# Patient Record
Sex: FEMALE | Race: BLACK OR AFRICAN AMERICAN | Employment: OTHER | URBAN - METROPOLITAN AREA
[De-identification: names, ages, dates, MRNs, and addresses within clinical notes are randomized per-mention and may not be internally consistent; named-entity substitution may affect disease eponyms.]

---

## 2017-04-05 ENCOUNTER — HOSPITAL ENCOUNTER (OUTPATIENT)
Dept: OTHER | Age: 82
Discharge: HOME | End: 2017-04-05
Attending: INTERNAL MEDICINE | Admitting: INTERNAL MEDICINE

## 2017-04-05 DIAGNOSIS — E11.9 CONTROLLED TYPE 2 DIABETES MELLITUS WITHOUT COMPLICATION, WITHOUT LONG-TERM CURRENT USE OF INSULIN (HCC): ICD-10-CM

## 2017-04-05 LAB
A/G RATIO: 1.2 (ref 1.1–2.2)
ALBUMIN SERPL-MCNC: 4.4 G/DL (ref 3.4–5)
ALP BLD-CCNC: 69 U/L (ref 40–129)
ALT SERPL-CCNC: 17 U/L (ref 10–40)
ANION GAP SERPL CALCULATED.3IONS-SCNC: 12 MMOL/L (ref 3–16)
AST SERPL-CCNC: 26 U/L (ref 15–37)
BILIRUB SERPL-MCNC: 0.6 MG/DL (ref 0–1)
BUN BLDV-MCNC: 12 MG/DL (ref 7–20)
CALCIUM SERPL-MCNC: 9.9 MG/DL (ref 8.3–10.6)
CHLORIDE BLD-SCNC: 103 MMOL/L (ref 99–110)
CHOLESTEROL, TOTAL: 167 MG/DL (ref 0–199)
CO2: 28 MMOL/L (ref 21–32)
CREAT SERPL-MCNC: 0.7 MG/DL (ref 0.6–1.2)
ESTIMATED AVERAGE GLUCOSE: 139.9 MG/DL
FOLATE: >20 NG/ML (ref 4.78–24.2)
GFR AFRICAN AMERICAN: >60
GFR NON-AFRICAN AMERICAN: >60
GLOBULIN: 3.6 G/DL
GLUCOSE BLD-MCNC: 78 MG/DL (ref 70–99)
HBA1C MFR BLD: 6.5 %
HCT VFR BLD CALC: 41.5 % (ref 36–48)
HDLC SERPL-MCNC: 69 MG/DL (ref 40–60)
HEMOGLOBIN: 13.1 G/DL (ref 12–16)
LDL CHOLESTEROL CALCULATED: 80 MG/DL
MCH RBC QN AUTO: 30.7 PG (ref 26–34)
MCHC RBC AUTO-ENTMCNC: 31.7 G/DL (ref 31–36)
MCV RBC AUTO: 96.9 FL (ref 80–100)
PDW BLD-RTO: 15 % (ref 12.4–15.4)
PLATELET # BLD: 191 K/UL (ref 135–450)
PMV BLD AUTO: 9.1 FL (ref 5–10.5)
POTASSIUM SERPL-SCNC: 5.1 MMOL/L (ref 3.5–5.1)
RBC # BLD: 4.28 M/UL (ref 4–5.2)
SODIUM BLD-SCNC: 143 MMOL/L (ref 136–145)
TOTAL PROTEIN: 8 G/DL (ref 6.4–8.2)
TRIGL SERPL-MCNC: 92 MG/DL (ref 0–150)
TSH SERPL DL<=0.05 MIU/L-ACNC: 0.6 UIU/ML (ref 0.27–4.2)
VITAMIN B-12: 597 PG/ML (ref 211–911)
VLDLC SERPL CALC-MCNC: 18 MG/DL
WBC # BLD: 4.7 K/UL (ref 4–11)

## 2017-04-10 ENCOUNTER — OFFICE VISIT (OUTPATIENT)
Dept: INTERNAL MEDICINE CLINIC | Age: 82
End: 2017-04-10

## 2017-04-10 VITALS
HEART RATE: 82 BPM | WEIGHT: 161 LBS | SYSTOLIC BLOOD PRESSURE: 130 MMHG | DIASTOLIC BLOOD PRESSURE: 72 MMHG | BODY MASS INDEX: 25.99 KG/M2 | OXYGEN SATURATION: 98 %

## 2017-04-10 DIAGNOSIS — E11.21 CONTROLLED TYPE 2 DIABETES MELLITUS WITH DIABETIC NEPHROPATHY, WITHOUT LONG-TERM CURRENT USE OF INSULIN (HCC): Primary | ICD-10-CM

## 2017-04-10 PROCEDURE — 99214 OFFICE O/P EST MOD 30 MIN: CPT | Performed by: INTERNAL MEDICINE

## 2017-08-15 ENCOUNTER — OFFICE VISIT (OUTPATIENT)
Dept: ENT CLINIC | Age: 82
End: 2017-08-15

## 2017-08-15 VITALS — DIASTOLIC BLOOD PRESSURE: 77 MMHG | SYSTOLIC BLOOD PRESSURE: 157 MMHG | HEART RATE: 63 BPM | HEIGHT: 66 IN

## 2017-08-15 DIAGNOSIS — J30.9 ALLERGIC SINUSITIS: Primary | ICD-10-CM

## 2017-08-15 DIAGNOSIS — H60.63 CHRONIC EXTERNAL EAR INFECTION, BILATERAL: ICD-10-CM

## 2017-08-15 DIAGNOSIS — K21.9 LARYNGOPHARYNGEAL REFLUX DISEASE: ICD-10-CM

## 2017-08-15 DIAGNOSIS — H61.23 BILATERAL IMPACTED CERUMEN: ICD-10-CM

## 2017-08-15 PROCEDURE — 69210 REMOVE IMPACTED EAR WAX UNI: CPT | Performed by: OTOLARYNGOLOGY

## 2017-10-07 ENCOUNTER — HOSPITAL ENCOUNTER (OUTPATIENT)
Dept: OTHER | Age: 82
Discharge: HOME | End: 2017-10-07
Attending: INTERNAL MEDICINE | Admitting: INTERNAL MEDICINE

## 2017-10-07 DIAGNOSIS — E11.21 CONTROLLED TYPE 2 DIABETES MELLITUS WITH DIABETIC NEPHROPATHY, WITHOUT LONG-TERM CURRENT USE OF INSULIN (HCC): ICD-10-CM

## 2017-10-07 LAB
A/G RATIO: 1 (ref 1.1–2.2)
ALBUMIN SERPL-MCNC: 4.1 G/DL (ref 3.4–5)
ALP BLD-CCNC: 64 U/L (ref 40–129)
ALT SERPL-CCNC: 12 U/L (ref 10–40)
ANION GAP SERPL CALCULATED.3IONS-SCNC: 18 MMOL/L (ref 3–16)
AST SERPL-CCNC: 28 U/L (ref 15–37)
BILIRUB SERPL-MCNC: 0.5 MG/DL (ref 0–1)
BUN BLDV-MCNC: 11 MG/DL (ref 7–20)
CALCIUM SERPL-MCNC: 10.2 MG/DL (ref 8.3–10.6)
CHLORIDE BLD-SCNC: 102 MMOL/L (ref 99–110)
CHOLESTEROL, TOTAL: 160 MG/DL (ref 0–199)
CO2: 24 MMOL/L (ref 21–32)
CREAT SERPL-MCNC: 0.8 MG/DL (ref 0.6–1.2)
GFR AFRICAN AMERICAN: >60
GFR NON-AFRICAN AMERICAN: >60
GLOBULIN: 4.3 G/DL
GLUCOSE BLD-MCNC: 76 MG/DL (ref 70–99)
HCT VFR BLD CALC: 42.7 % (ref 36–48)
HDLC SERPL-MCNC: 58 MG/DL (ref 40–60)
HEMOGLOBIN: 13.7 G/DL (ref 12–16)
LDL CHOLESTEROL CALCULATED: 83 MG/DL
MCH RBC QN AUTO: 31.6 PG (ref 26–34)
MCHC RBC AUTO-ENTMCNC: 32 G/DL (ref 31–36)
MCV RBC AUTO: 98.7 FL (ref 80–100)
PDW BLD-RTO: 15 % (ref 12.4–15.4)
PLATELET # BLD: 177 K/UL (ref 135–450)
PMV BLD AUTO: 9.5 FL (ref 5–10.5)
POTASSIUM SERPL-SCNC: 4.2 MMOL/L (ref 3.5–5.1)
RBC # BLD: 4.33 M/UL (ref 4–5.2)
SODIUM BLD-SCNC: 144 MMOL/L (ref 136–145)
TOTAL PROTEIN: 8.4 G/DL (ref 6.4–8.2)
TRIGL SERPL-MCNC: 94 MG/DL (ref 0–150)
VLDLC SERPL CALC-MCNC: 19 MG/DL
WBC # BLD: 4.2 K/UL (ref 4–11)

## 2017-10-08 LAB
ESTIMATED AVERAGE GLUCOSE: 148.5 MG/DL
HBA1C MFR BLD: 6.8 %

## 2017-10-09 ENCOUNTER — OFFICE VISIT (OUTPATIENT)
Dept: INTERNAL MEDICINE CLINIC | Age: 82
End: 2017-10-09

## 2017-10-09 VITALS
WEIGHT: 171 LBS | DIASTOLIC BLOOD PRESSURE: 72 MMHG | HEIGHT: 66 IN | HEART RATE: 64 BPM | BODY MASS INDEX: 27.48 KG/M2 | SYSTOLIC BLOOD PRESSURE: 158 MMHG

## 2017-10-09 DIAGNOSIS — E11.9 CONTROLLED TYPE 2 DIABETES MELLITUS WITHOUT COMPLICATION, WITHOUT LONG-TERM CURRENT USE OF INSULIN (HCC): Primary | ICD-10-CM

## 2017-10-09 DIAGNOSIS — I10 ESSENTIAL HYPERTENSION: ICD-10-CM

## 2017-10-09 PROCEDURE — 93000 ELECTROCARDIOGRAM COMPLETE: CPT | Performed by: INTERNAL MEDICINE

## 2017-10-09 PROCEDURE — 99214 OFFICE O/P EST MOD 30 MIN: CPT | Performed by: INTERNAL MEDICINE

## 2017-10-09 RX ORDER — LISINOPRIL 10 MG/1
10 TABLET ORAL 2 TIMES DAILY
Qty: 180 TABLET | Refills: 3 | Status: SHIPPED | OUTPATIENT
Start: 2017-10-09 | End: 2018-09-05 | Stop reason: SDUPTHER

## 2017-10-09 RX ORDER — HYDROCHLOROTHIAZIDE 12.5 MG/1
12.5 CAPSULE, GELATIN COATED ORAL EVERY MORNING
Qty: 30 CAPSULE | Refills: 3 | Status: SHIPPED | OUTPATIENT
Start: 2017-10-09 | End: 2018-02-04 | Stop reason: SDUPTHER

## 2017-10-09 NOTE — PROGRESS NOTES
Subjective:      Patient ID: Riya Dong is a 80 y.o. female. HPI-no complaints, BS well controlled at home    Review of Systems-saw Ophth June 2017    Objective:   Physical Exam   Constitutional: She is oriented to person, place, and time. She appears well-developed and well-nourished. No distress. Neck: No JVD present. Cardiovascular: Normal rate and normal heart sounds. No murmur heard. Pulmonary/Chest: Effort normal and breath sounds normal. No respiratory distress. Abdominal: Soft. Bowel sounds are normal. She exhibits no distension. Genitourinary:   Genitourinary Comments: deferred   Musculoskeletal: She exhibits no edema. Neurological: She is alert and oriented to person, place, and time. Psychiatric: She has a normal mood and affect. Her behavior is normal. Judgment and thought content normal.   Nursing note and vitals reviewed.     EKG-normal, kvng klein    Lab Results   Component Value Date    LABA1C 6.8 10/07/2017     Lab Results   Component Value Date    .5 10/07/2017     Lab Results   Component Value Date    CHOL 160 10/07/2017    CHOL 167 04/05/2017    CHOL 147 10/10/2016     Lab Results   Component Value Date    TRIG 94 10/07/2017    TRIG 92 04/05/2017    TRIG 76 10/10/2016     Lab Results   Component Value Date    HDL 58 10/07/2017    HDL 69 (H) 04/05/2017    HDL 62 (H) 10/10/2016     Lab Results   Component Value Date    LDLCALC 83 10/07/2017    LDLCALC 80 04/05/2017    LDLCALC 70 10/10/2016     Lab Results   Component Value Date    LABVLDL 19 10/07/2017    LABVLDL 18 04/05/2017    LABVLDL 15 10/10/2016     No results found for: CHOLHDLRATIO  Lab Results   Component Value Date    WBC 4.2 10/07/2017    HGB 13.7 10/07/2017    HCT 42.7 10/07/2017    MCV 98.7 10/07/2017     10/07/2017     Lab Results   Component Value Date     10/07/2017    K 4.2 10/07/2017     10/07/2017    CO2 24 10/07/2017    BUN 11 10/07/2017    CREATININE 0.8 10/07/2017    GLUCOSE

## 2017-11-03 ENCOUNTER — TELEPHONE (OUTPATIENT)
Dept: INTERNAL MEDICINE CLINIC | Age: 82
End: 2017-11-03

## 2017-11-03 NOTE — TELEPHONE ENCOUNTER
----- Message from Yevgeniy Martinez MD sent at 11/2/2017  1:13 PM EDT -----  OK to f/u in 1 year for next mammogram    Yevgeniy Martinez

## 2017-11-21 ENCOUNTER — NURSE ONLY (OUTPATIENT)
Dept: INTERNAL MEDICINE CLINIC | Age: 82
End: 2017-11-21

## 2017-11-21 VITALS — DIASTOLIC BLOOD PRESSURE: 68 MMHG | SYSTOLIC BLOOD PRESSURE: 136 MMHG

## 2018-01-24 ENCOUNTER — OFFICE VISIT (OUTPATIENT)
Dept: ENT CLINIC | Age: 83
End: 2018-01-24

## 2018-01-24 VITALS — SYSTOLIC BLOOD PRESSURE: 120 MMHG | DIASTOLIC BLOOD PRESSURE: 80 MMHG

## 2018-01-24 DIAGNOSIS — J30.9 ALLERGIC SINUSITIS: Primary | ICD-10-CM

## 2018-01-24 DIAGNOSIS — J32.9 RECURRENT SINUSITIS: ICD-10-CM

## 2018-01-24 PROCEDURE — 96372 THER/PROPH/DIAG INJ SC/IM: CPT | Performed by: OTOLARYNGOLOGY

## 2018-01-24 PROCEDURE — 99213 OFFICE O/P EST LOW 20 MIN: CPT | Performed by: OTOLARYNGOLOGY

## 2018-01-24 RX ORDER — TRIMETHOPRIM 100 MG/1
100 TABLET ORAL 2 TIMES DAILY
COMMUNITY
End: 2018-08-01

## 2018-01-24 RX ORDER — MONTELUKAST SODIUM 10 MG/1
10 TABLET ORAL NIGHTLY
COMMUNITY
End: 2021-04-13 | Stop reason: ALTCHOICE

## 2018-01-24 RX ORDER — METHYLPREDNISOLONE ACETATE 40 MG/ML
40 INJECTION, SUSPENSION INTRA-ARTICULAR; INTRALESIONAL; INTRAMUSCULAR; SOFT TISSUE ONCE
Status: COMPLETED | OUTPATIENT
Start: 2018-01-24 | End: 2018-01-24

## 2018-01-24 RX ORDER — AZITHROMYCIN 250 MG/1
TABLET, FILM COATED ORAL
Qty: 1 PACKET | Refills: 0 | Status: SHIPPED | OUTPATIENT
Start: 2018-01-24 | End: 2018-04-11

## 2018-01-24 RX ADMIN — METHYLPREDNISOLONE ACETATE 40 MG: 40 INJECTION, SUSPENSION INTRA-ARTICULAR; INTRALESIONAL; INTRAMUSCULAR; SOFT TISSUE at 10:32

## 2018-04-11 ENCOUNTER — OFFICE VISIT (OUTPATIENT)
Dept: INTERNAL MEDICINE CLINIC | Age: 83
End: 2018-04-11

## 2018-04-11 ENCOUNTER — HOSPITAL ENCOUNTER (OUTPATIENT)
Dept: OTHER | Age: 83
Discharge: HOME | End: 2018-04-11
Attending: INTERNAL MEDICINE | Admitting: INTERNAL MEDICINE

## 2018-04-11 VITALS
BODY MASS INDEX: 27.97 KG/M2 | SYSTOLIC BLOOD PRESSURE: 134 MMHG | HEIGHT: 66 IN | HEART RATE: 68 BPM | DIASTOLIC BLOOD PRESSURE: 68 MMHG | WEIGHT: 174 LBS

## 2018-04-11 DIAGNOSIS — Z91.81 AT MODERATE RISK FOR FALL: ICD-10-CM

## 2018-04-11 DIAGNOSIS — R00.2 HEART PALPITATIONS: ICD-10-CM

## 2018-04-11 DIAGNOSIS — E11.21 CONTROLLED TYPE 2 DIABETES MELLITUS WITH DIABETIC NEPHROPATHY, WITHOUT LONG-TERM CURRENT USE OF INSULIN (HCC): Primary | ICD-10-CM

## 2018-04-11 LAB
A/G RATIO: 1.2 (ref 1.1–2.2)
ALBUMIN SERPL-MCNC: 4.2 G/DL (ref 3.4–5)
ALP BLD-CCNC: 58 U/L (ref 40–129)
ALT SERPL-CCNC: 15 U/L (ref 10–40)
ANION GAP SERPL CALCULATED.3IONS-SCNC: 13 MMOL/L (ref 3–16)
AST SERPL-CCNC: 25 U/L (ref 15–37)
BILIRUB SERPL-MCNC: 0.4 MG/DL (ref 0–1)
BUN BLDV-MCNC: 15 MG/DL (ref 7–20)
CALCIUM SERPL-MCNC: 10 MG/DL (ref 8.3–10.6)
CHLORIDE BLD-SCNC: 102 MMOL/L (ref 99–110)
CHOLESTEROL, TOTAL: 175 MG/DL (ref 0–199)
CO2: 28 MMOL/L (ref 21–32)
CREAT SERPL-MCNC: 0.8 MG/DL (ref 0.6–1.2)
GFR AFRICAN AMERICAN: >60
GFR NON-AFRICAN AMERICAN: >60
GLOBULIN: 3.6 G/DL
GLUCOSE BLD-MCNC: 74 MG/DL (ref 70–99)
HCT VFR BLD CALC: 38.5 % (ref 36–48)
HDLC SERPL-MCNC: 71 MG/DL (ref 40–60)
HEMOGLOBIN: 12.7 G/DL (ref 12–16)
LDL CHOLESTEROL CALCULATED: 85 MG/DL
MCH RBC QN AUTO: 31.4 PG (ref 26–34)
MCHC RBC AUTO-ENTMCNC: 32.8 G/DL (ref 31–36)
MCV RBC AUTO: 95.7 FL (ref 80–100)
PDW BLD-RTO: 14.7 % (ref 12.4–15.4)
PLATELET # BLD: 221 K/UL (ref 135–450)
PMV BLD AUTO: 8.4 FL (ref 5–10.5)
POTASSIUM SERPL-SCNC: 4.4 MMOL/L (ref 3.5–5.1)
RBC # BLD: 4.03 M/UL (ref 4–5.2)
SODIUM BLD-SCNC: 143 MMOL/L (ref 136–145)
TOTAL PROTEIN: 7.8 G/DL (ref 6.4–8.2)
TRIGL SERPL-MCNC: 97 MG/DL (ref 0–150)
VLDLC SERPL CALC-MCNC: 19 MG/DL
WBC # BLD: 6.3 K/UL (ref 4–11)

## 2018-04-11 PROCEDURE — 99213 OFFICE O/P EST LOW 20 MIN: CPT | Performed by: INTERNAL MEDICINE

## 2018-04-11 RX ORDER — METOPROLOL SUCCINATE 25 MG/1
TABLET, EXTENDED RELEASE ORAL
Qty: 90 TABLET | Refills: 3 | Status: SHIPPED | OUTPATIENT
Start: 2018-04-11 | End: 2019-10-14 | Stop reason: SDUPTHER

## 2018-04-11 ASSESSMENT — PATIENT HEALTH QUESTIONNAIRE - PHQ9
2. FEELING DOWN, DEPRESSED OR HOPELESS: 0
SUM OF ALL RESPONSES TO PHQ9 QUESTIONS 1 & 2: 0
SUM OF ALL RESPONSES TO PHQ QUESTIONS 1-9: 0
1. LITTLE INTEREST OR PLEASURE IN DOING THINGS: 0

## 2018-04-12 ENCOUNTER — TELEPHONE (OUTPATIENT)
Dept: INTERNAL MEDICINE CLINIC | Age: 83
End: 2018-04-12

## 2018-04-12 LAB
ESTIMATED AVERAGE GLUCOSE: 137 MG/DL
HBA1C MFR BLD: 6.4 %

## 2018-04-26 ENCOUNTER — HOSPITAL ENCOUNTER (OUTPATIENT)
Dept: ULTRASOUND IMAGING | Age: 83
Discharge: HOME | End: 2018-04-26
Attending: PHYSICIAN ASSISTANT | Admitting: PHYSICIAN ASSISTANT

## 2018-04-26 DIAGNOSIS — N30.20 OTHER CHRONIC CYSTITIS WITHOUT HEMATURIA: ICD-10-CM

## 2018-04-26 DIAGNOSIS — N30.00 ACUTE CYSTITIS WITHOUT HEMATURIA: ICD-10-CM

## 2018-05-15 ENCOUNTER — HOSPITAL ENCOUNTER (OUTPATIENT)
Dept: PHYSICAL THERAPY | Age: 83
Discharge: HOME | End: 2018-05-31
Admitting: INTERNAL MEDICINE

## 2018-05-15 ASSESSMENT — PAIN DESCRIPTION - PAIN TYPE: TYPE: CHRONIC PAIN

## 2018-05-15 ASSESSMENT — PAIN DESCRIPTION - LOCATION: LOCATION: HIP

## 2018-05-15 ASSESSMENT — PAIN DESCRIPTION - DESCRIPTORS: DESCRIPTORS: ACHING

## 2018-05-15 ASSESSMENT — PAIN DESCRIPTION - FREQUENCY: FREQUENCY: INTERMITTENT

## 2018-05-15 ASSESSMENT — PAIN SCALES - GENERAL: PAINLEVEL_OUTOF10: 8

## 2018-05-15 ASSESSMENT — PAIN DESCRIPTION - ORIENTATION: ORIENTATION: RIGHT

## 2018-05-31 ENCOUNTER — HOSPITAL ENCOUNTER (OUTPATIENT)
Dept: PHYSICAL THERAPY | Age: 83
Discharge: HOME | End: 2018-06-30
Admitting: INTERNAL MEDICINE

## 2018-05-31 NOTE — FLOWSHEET NOTE
Physical Therapy Daily Treatment Note  Date:  2018    Patient Name:  Jonnathan Elliott    :  1931  MRN: [id number]  Restrictions/Precautions:   Fall risk, use gait belt   Medical/Treatment Diagnosis Information:   · Diagnosis: Moderate Risk for Falls z91.81   · Treatment Diagnosis: Impaired gait, balance, fall risk     Tracking Information:  Physician Information Referring Practitioner: Olivier Mcclendon     Plan of Care Sent Date:  5/15/18  Signed Received: 5/15/18    Visit Count / Total Visits       Insurance Approved Visits  /  Approved Dates:     Insurance Information PT Insurance Information: [employer] $40 copay med Cleda Pate      Progress Note/G-codes   []  Yes  [x]  No Next Due: 10th visit      Pain level: 0/10     Subjective:   Patient states that she does not notice that she has changed that much in her balance skills, and still has problems turning and on unstable surfaces. No falls in past couple days. Objective:   Observation:  . Fatigues with standing activities, required 3 rest breaks during 1 hour treatment;   . Pt came into clinic with quad cane switched to left hand per discussion at [facility], but had cane backwards. Discussed allowing therapist to turn for her, but pt said she was still uncomfortable using it in left hand. Discussed gradually weening to left hand and did not change at this time. Multiple cues required during session for correct technique with exercises   Test measurements:      Exercises:  Exercise/Equipment Resistance/Repetitions Other comments   Nustep   : unavailable this date   IB 30\" x 2, 1x10 HR    Mat Hip Exercises HEP Demo:  L    Sit to stand from chair x10 consecutive reps w/ B UE support         . Trunk extension, decreased fwd weight shift with sit to stands.   Mod verbal/tactile cues required   Balance // Bars Step Ups Fwd/Lat 6\" x 5 B                  Rockerboard ant/post x20, DLS N16\"  med/lat x 20, DLS O12\"          Airex:  Tandem 1x30\"  NBOS ball rot x20  Ball nods x20    Grey mat:  Walking forward x2laps  Walking backward x2laps  Walking with head nods x2laps  Walking with head turns x2 laps  [facility]one Step-over (orange cones) x 10 B with BUE support  5/31: Patient required maximal cues on what to do and required frequent rest breaks but declined water. Gait training      amb w/ intermittant quad cane uasge from clinic to  and around basketball court, w one seated rest break. Other Therapeutic Activities:  Pt was educated on PT POC, Diagnosis, Prognosis, pathomechanics, as well as treatment goals and options, and frequency/duration of scheduling future physical therapy appointments. Time was also taken on this day to answer all patient questions involving their participation in PT    Home Exercise Program:  Pt was educated on HEP including distribution of handout describing exercises, sets, repetitions, frequency and intensity. Exercises/activities include: standing hip abd, hip marching, heel/toe raises, sitting hip marching, LAQ, hip abduction     Manual Treatments:      Modalities:      Timed Code Treatment Minutes: 58    Total Treatment Minutes:   58    Treatment/Activity Tolerance:  [x] Patient tolerated treatment well [] Patient limited by fatigue  [] Patient limited by pain  [] Patient limited by other medical complications  [] Other:     Prognosis: [x] Good [] Fair  [] Poor    Patient Requires Follow-up: [x] Yes  [] No    Plan:   [x] Continue per plan of care [] Alter current plan (see comments)  [] Plan of care initiated [] Hold pending MD visit [] Discharge  Plan for Next Session:   Progress balance, hip strengthening, reduce fall risk. Assess and improve gait mechanics, consider switching canes to left hand if able.   Improve confidence to reduce pt's fall risk     Electronically signed by:  Mago Hicks SPT  Therapist was present, directed the patient's care, made skilled judgement, and was responsible for assessment, and treatment of the patient.

## 2018-06-01 ENCOUNTER — HOSPITAL ENCOUNTER (OUTPATIENT)
Dept: OTHER | Age: 83
Discharge: HOME | End: 2018-06-01
Attending: INTERNAL MEDICINE | Admitting: INTERNAL MEDICINE

## 2018-07-01 ENCOUNTER — HOSPITAL ENCOUNTER (OUTPATIENT)
Dept: OTHER | Age: 83
Discharge: HOME | End: 2018-07-31
Attending: INTERNAL MEDICINE | Admitting: INTERNAL MEDICINE

## 2018-08-01 ENCOUNTER — OFFICE VISIT (OUTPATIENT)
Dept: INTERNAL MEDICINE CLINIC | Age: 83
End: 2018-08-01

## 2018-08-01 ENCOUNTER — HOSPITAL ENCOUNTER (OUTPATIENT)
Dept: OTHER | Age: 83
Discharge: HOME | End: 2018-08-01
Attending: INTERNAL MEDICINE | Admitting: INTERNAL MEDICINE

## 2018-08-01 VITALS
BODY MASS INDEX: 28.82 KG/M2 | SYSTOLIC BLOOD PRESSURE: 124 MMHG | HEIGHT: 65 IN | TEMPERATURE: 97.8 F | WEIGHT: 173 LBS | HEART RATE: 64 BPM | DIASTOLIC BLOOD PRESSURE: 60 MMHG

## 2018-08-01 DIAGNOSIS — E04.9 GOITER: ICD-10-CM

## 2018-08-01 DIAGNOSIS — Z01.818 PRE-OP EXAMINATION: ICD-10-CM

## 2018-08-01 DIAGNOSIS — Z01.818 PRE-OP EXAMINATION: Primary | ICD-10-CM

## 2018-08-01 LAB
A/G RATIO: 1.1 (ref 1.1–2.2)
ALBUMIN SERPL-MCNC: 4.1 G/DL (ref 3.4–5)
ALP BLD-CCNC: 60 U/L (ref 40–129)
ALT SERPL-CCNC: 15 U/L (ref 10–40)
ANION GAP SERPL CALCULATED.3IONS-SCNC: 16 MMOL/L (ref 3–16)
AST SERPL-CCNC: 25 U/L (ref 15–37)
BILIRUB SERPL-MCNC: 0.3 MG/DL (ref 0–1)
BILIRUBIN, POC: NORMAL
BLOOD URINE, POC: NORMAL
BUN BLDV-MCNC: 21 MG/DL (ref 7–20)
CALCIUM SERPL-MCNC: 10.3 MG/DL (ref 8.3–10.6)
CHLORIDE BLD-SCNC: 106 MMOL/L (ref 99–110)
CLARITY, POC: CLEAR
CO2: 22 MMOL/L (ref 21–32)
COLOR, POC: NORMAL
CREAT SERPL-MCNC: 1.1 MG/DL (ref 0.6–1.2)
GFR AFRICAN AMERICAN: 57
GFR NON-AFRICAN AMERICAN: 47
GLOBULIN: 3.7 G/DL
GLUCOSE BLD-MCNC: 82 MG/DL (ref 70–99)
GLUCOSE URINE, POC: NORMAL
HCT VFR BLD CALC: 35.6 % (ref 36–48)
HEMOGLOBIN: 11.7 G/DL (ref 12–16)
KETONES, POC: NORMAL
LEUKOCYTE EST, POC: NORMAL
MCH RBC QN AUTO: 32 PG (ref 26–34)
MCHC RBC AUTO-ENTMCNC: 33 G/DL (ref 31–36)
MCV RBC AUTO: 97 FL (ref 80–100)
NITRITE, POC: NORMAL
PDW BLD-RTO: 14.3 % (ref 12.4–15.4)
PH, POC: 5
PLATELET # BLD: 224 K/UL (ref 135–450)
PMV BLD AUTO: 8.5 FL (ref 5–10.5)
POTASSIUM SERPL-SCNC: 5.1 MMOL/L (ref 3.5–5.1)
PROTEIN, POC: NORMAL
RBC # BLD: 3.67 M/UL (ref 4–5.2)
SODIUM BLD-SCNC: 144 MMOL/L (ref 136–145)
SPECIFIC GRAVITY, POC: 1.03
TOTAL PROTEIN: 7.8 G/DL (ref 6.4–8.2)
TSH SERPL DL<=0.05 MIU/L-ACNC: 0.63 UIU/ML (ref 0.27–4.2)
UROBILINOGEN, POC: 1
WBC # BLD: 5.4 K/UL (ref 4–11)

## 2018-08-01 PROCEDURE — 99214 OFFICE O/P EST MOD 30 MIN: CPT | Performed by: INTERNAL MEDICINE

## 2018-08-01 PROCEDURE — 81002 URINALYSIS NONAUTO W/O SCOPE: CPT | Performed by: INTERNAL MEDICINE

## 2018-08-01 PROCEDURE — 93000 ELECTROCARDIOGRAM COMPLETE: CPT | Performed by: INTERNAL MEDICINE

## 2018-08-01 ASSESSMENT — PATIENT HEALTH QUESTIONNAIRE - PHQ9
SUM OF ALL RESPONSES TO PHQ9 QUESTIONS 1 & 2: 0
1. LITTLE INTEREST OR PLEASURE IN DOING THINGS: 0
2. FEELING DOWN, DEPRESSED OR HOPELESS: 0
SUM OF ALL RESPONSES TO PHQ QUESTIONS 1-9: 0

## 2018-08-02 ENCOUNTER — TELEPHONE (OUTPATIENT)
Dept: INTERNAL MEDICINE CLINIC | Age: 83
End: 2018-08-02

## 2018-08-02 NOTE — TELEPHONE ENCOUNTER
----- Message from Carmen Figueroa MD sent at 8/2/2018  8:58 AM EDT -----  Labs OK for surgery as planned    Carmen Figueroa

## 2018-09-07 ENCOUNTER — NURSE ONLY (OUTPATIENT)
Dept: INTERNAL MEDICINE CLINIC | Age: 83
End: 2018-09-07

## 2018-10-08 ENCOUNTER — HOSPITAL ENCOUNTER (OUTPATIENT)
Age: 83
Discharge: HOME | End: 2018-10-08
Payer: MEDICARE

## 2018-10-08 DIAGNOSIS — E11.21 CONTROLLED TYPE 2 DIABETES MELLITUS WITH DIABETIC NEPHROPATHY, WITHOUT LONG-TERM CURRENT USE OF INSULIN (HCC): ICD-10-CM

## 2018-10-08 LAB
A/G RATIO: 1.1 (ref 1.1–2.2)
ALBUMIN SERPL-MCNC: 4 G/DL (ref 3.4–5)
ALP BLD-CCNC: 62 U/L (ref 40–129)
ALT SERPL-CCNC: 11 U/L (ref 10–40)
ANION GAP SERPL CALCULATED.3IONS-SCNC: 12 MMOL/L (ref 3–16)
AST SERPL-CCNC: 24 U/L (ref 15–37)
BILIRUB SERPL-MCNC: 0.4 MG/DL (ref 0–1)
BUN BLDV-MCNC: 19 MG/DL (ref 7–20)
CALCIUM SERPL-MCNC: 10.3 MG/DL (ref 8.3–10.6)
CHLORIDE BLD-SCNC: 108 MMOL/L (ref 99–110)
CHOLESTEROL, TOTAL: 149 MG/DL (ref 0–199)
CO2: 26 MMOL/L (ref 21–32)
CREAT SERPL-MCNC: 1 MG/DL (ref 0.6–1.2)
ESTIMATED AVERAGE GLUCOSE: 137 MG/DL
GFR AFRICAN AMERICAN: >60
GFR NON-AFRICAN AMERICAN: 52
GLOBULIN: 3.5 G/DL
GLUCOSE BLD-MCNC: 85 MG/DL (ref 70–99)
HBA1C MFR BLD: 6.4 %
HCT VFR BLD CALC: 35.6 % (ref 36–48)
HDLC SERPL-MCNC: 47 MG/DL (ref 40–60)
HEMOGLOBIN: 11.7 G/DL (ref 12–16)
LDL CHOLESTEROL CALCULATED: 82 MG/DL
MCH RBC QN AUTO: 32.1 PG (ref 26–34)
MCHC RBC AUTO-ENTMCNC: 33 G/DL (ref 31–36)
MCV RBC AUTO: 97.4 FL (ref 80–100)
PDW BLD-RTO: 13.9 % (ref 12.4–15.4)
PLATELET # BLD: 226 K/UL (ref 135–450)
PMV BLD AUTO: 8.7 FL (ref 5–10.5)
POTASSIUM SERPL-SCNC: 5 MMOL/L (ref 3.5–5.1)
RBC # BLD: 3.66 M/UL (ref 4–5.2)
SODIUM BLD-SCNC: 146 MMOL/L (ref 136–145)
TOTAL PROTEIN: 7.5 G/DL (ref 6.4–8.2)
TRIGL SERPL-MCNC: 102 MG/DL (ref 0–150)
VLDLC SERPL CALC-MCNC: 20 MG/DL
WBC # BLD: 6 K/UL (ref 4–11)

## 2018-10-08 PROCEDURE — 80053 COMPREHEN METABOLIC PANEL: CPT

## 2018-10-08 PROCEDURE — 83036 HEMOGLOBIN GLYCOSYLATED A1C: CPT

## 2018-10-08 PROCEDURE — 80061 LIPID PANEL: CPT

## 2018-10-08 PROCEDURE — 36415 COLL VENOUS BLD VENIPUNCTURE: CPT

## 2018-10-08 PROCEDURE — 85027 COMPLETE CBC AUTOMATED: CPT

## 2018-10-11 ENCOUNTER — OFFICE VISIT (OUTPATIENT)
Dept: INTERNAL MEDICINE CLINIC | Age: 83
End: 2018-10-11

## 2018-10-11 VITALS
HEIGHT: 66 IN | HEART RATE: 68 BPM | BODY MASS INDEX: 27.32 KG/M2 | WEIGHT: 170 LBS | SYSTOLIC BLOOD PRESSURE: 122 MMHG | DIASTOLIC BLOOD PRESSURE: 64 MMHG

## 2018-10-11 DIAGNOSIS — E11.9 CONTROLLED TYPE 2 DIABETES MELLITUS WITHOUT COMPLICATION, WITHOUT LONG-TERM CURRENT USE OF INSULIN (HCC): Primary | ICD-10-CM

## 2018-10-11 PROCEDURE — 99214 OFFICE O/P EST MOD 30 MIN: CPT | Performed by: INTERNAL MEDICINE

## 2018-10-11 RX ORDER — MUPIROCIN CALCIUM 20 MG/G
CREAM TOPICAL
Qty: 1 TUBE | Refills: 2 | Status: SHIPPED | OUTPATIENT
Start: 2018-10-11 | End: 2018-11-10

## 2018-10-11 ASSESSMENT — PATIENT HEALTH QUESTIONNAIRE - PHQ9
SUM OF ALL RESPONSES TO PHQ QUESTIONS 1-9: 0
SUM OF ALL RESPONSES TO PHQ9 QUESTIONS 1 & 2: 0
1. LITTLE INTEREST OR PLEASURE IN DOING THINGS: 0
2. FEELING DOWN, DEPRESSED OR HOPELESS: 0
SUM OF ALL RESPONSES TO PHQ QUESTIONS 1-9: 0

## 2018-10-11 NOTE — PROGRESS NOTES
LABVLDL 19 04/11/2018    LABVLDL 19 10/07/2017     No results found for: [facility]  Lab Results   Component Value Date    LABA1C 6.4 10/08/2018     Lab Results   Component Value Date    .0 10/08/2018         Assessment:      Patient Active Problem List   Diagnosis    Diabetes mellitus type 2, controlled ([facility] [address]    Pure hypercholesterolemia    Gait disorder    TIA (transient ischemic attack)    Abnormal mammogram    Diabetic neuropathy (HCC)    Low back pain    Meningioma (HCC)    Colon cancer screening    Impacted cerumen    Perennial allergic rhinitis    Poor memory    Chronic external ear infection    Left shoulder pain    Dysphagia    Cyst    Laryngopharyngeal reflux disease    Chronic eczematous otitis externa of both ears    Thyroid nodule    Risk for falls    Heart palpitations    Hypertension    Recurrent sinusitis    Bilateral impacted cerumen           Plan:      Continue current meds    Bactroban cream for laceration L buttock cheek   f/u in 6 months with labs prior    Dipak Sen      HM: annual fluvax advised every Emerita Barrera MD

## 2018-11-07 ENCOUNTER — TELEPHONE (OUTPATIENT)
Dept: INTERNAL MEDICINE CLINIC | Age: 83
End: 2018-11-07

## 2019-04-09 ENCOUNTER — HOSPITAL ENCOUNTER (OUTPATIENT)
Age: 84
Discharge: HOME | End: 2019-04-09
Payer: MEDICARE

## 2019-04-09 DIAGNOSIS — E11.9 CONTROLLED TYPE 2 DIABETES MELLITUS WITHOUT COMPLICATION, WITHOUT LONG-TERM CURRENT USE OF INSULIN (HCC): ICD-10-CM

## 2019-04-09 LAB
A/G RATIO: 0.9 (ref 1.1–2.2)
ALBUMIN SERPL-MCNC: 3.7 G/DL (ref 3.4–5)
ALP BLD-CCNC: 65 U/L (ref 40–129)
ALT SERPL-CCNC: 13 U/L (ref 10–40)
ANION GAP SERPL CALCULATED.3IONS-SCNC: 10 MMOL/L (ref 3–16)
AST SERPL-CCNC: 20 U/L (ref 15–37)
BILIRUB SERPL-MCNC: <0.2 MG/DL (ref 0–1)
BUN BLDV-MCNC: 20 MG/DL (ref 7–20)
CALCIUM SERPL-MCNC: 9.8 MG/DL (ref 8.3–10.6)
CHLORIDE BLD-SCNC: 108 MMOL/L (ref 99–110)
CHOLESTEROL, TOTAL: 146 MG/DL (ref 0–199)
CO2: 27 MMOL/L (ref 21–32)
CREAT SERPL-MCNC: 0.9 MG/DL (ref 0.6–1.2)
ESTIMATED AVERAGE GLUCOSE: 142.7 MG/DL
GFR AFRICAN AMERICAN: >60
GFR NON-AFRICAN AMERICAN: 59
GLOBULIN: 4 G/DL
GLUCOSE BLD-MCNC: 113 MG/DL (ref 70–99)
HBA1C MFR BLD: 6.6 %
HCT VFR BLD CALC: 35.2 % (ref 36–48)
HDLC SERPL-MCNC: 54 MG/DL (ref 40–60)
HEMOGLOBIN: 11.7 G/DL (ref 12–16)
LDL CHOLESTEROL CALCULATED: 73 MG/DL
MCH RBC QN AUTO: 32.7 PG (ref 26–34)
MCHC RBC AUTO-ENTMCNC: 33.3 G/DL (ref 31–36)
MCV RBC AUTO: 98.1 FL (ref 80–100)
PDW BLD-RTO: 14.7 % (ref 12.4–15.4)
PLATELET # BLD: 202 K/UL (ref 135–450)
PMV BLD AUTO: 8.9 FL (ref 5–10.5)
POTASSIUM SERPL-SCNC: 4.5 MMOL/L (ref 3.5–5.1)
RBC # BLD: 3.59 M/UL (ref 4–5.2)
SODIUM BLD-SCNC: 145 MMOL/L (ref 136–145)
TOTAL PROTEIN: 7.7 G/DL (ref 6.4–8.2)
TRIGL SERPL-MCNC: 97 MG/DL (ref 0–150)
VLDLC SERPL CALC-MCNC: 19 MG/DL
WBC # BLD: 5.8 K/UL (ref 4–11)

## 2019-04-09 PROCEDURE — 85027 COMPLETE CBC AUTOMATED: CPT

## 2019-04-09 PROCEDURE — 80053 COMPREHEN METABOLIC PANEL: CPT

## 2019-04-09 PROCEDURE — 80061 LIPID PANEL: CPT

## 2019-04-09 PROCEDURE — 83036 HEMOGLOBIN GLYCOSYLATED A1C: CPT

## 2019-04-09 PROCEDURE — 36415 COLL VENOUS BLD VENIPUNCTURE: CPT

## 2019-04-11 ENCOUNTER — OFFICE VISIT (OUTPATIENT)
Dept: INTERNAL MEDICINE CLINIC | Age: 84
End: 2019-04-11

## 2019-04-11 VITALS
WEIGHT: 175 LBS | SYSTOLIC BLOOD PRESSURE: 120 MMHG | HEART RATE: 73 BPM | OXYGEN SATURATION: 96 % | BODY MASS INDEX: 28.25 KG/M2 | DIASTOLIC BLOOD PRESSURE: 70 MMHG

## 2019-04-11 DIAGNOSIS — N18.2 CONTROLLED TYPE 2 DIABETES MELLITUS WITH STAGE 2 CHRONIC KIDNEY DISEASE, UNSPECIFIED WHETHER LONG TERM INSULIN USE (HCC): Primary | ICD-10-CM

## 2019-04-11 DIAGNOSIS — N30.00 ACUTE CYSTITIS WITHOUT HEMATURIA: ICD-10-CM

## 2019-04-11 DIAGNOSIS — E11.22 CONTROLLED TYPE 2 DIABETES MELLITUS WITH STAGE 2 CHRONIC KIDNEY DISEASE, UNSPECIFIED WHETHER LONG TERM INSULIN USE (HCC): Primary | ICD-10-CM

## 2019-04-11 PROCEDURE — 99214 OFFICE O/P EST MOD 30 MIN: CPT | Performed by: INTERNAL MEDICINE

## 2019-04-11 PROCEDURE — 81002 URINALYSIS NONAUTO W/O SCOPE: CPT | Performed by: INTERNAL MEDICINE

## 2019-04-11 RX ORDER — SULFAMETHOXAZOLE AND TRIMETHOPRIM 800; 160 MG/1; MG/1
1 TABLET ORAL 2 TIMES DAILY
Qty: 20 TABLET | Refills: 0 | Status: SHIPPED | OUTPATIENT
Start: 2019-04-11 | End: 2019-04-21

## 2019-04-11 NOTE — PROGRESS NOTES
Chief Complaint   Patient presents with    Diabetes     had labs done     Subjective:      Patient ID: Star Taylor is a 80 y.o. female. HPI-requests recent lab results be printed  AM BS ~ 120-130 on metformin alone  Wants her buttock checked today since she had a superficial laceration on her buttock cheek 6 months ago  Wears band aids since she picks her cuticles   I WANT A UA DONE TODAY! I THINK I HAD A TEAR IN MY RECTUM    Review of Systems     Current Outpatient Medications on File Prior to Visit   Medication Sig Dispense Refill    pravastatin (PRAVACHOL) 40 MG tablet TAKE ONE TABLET BY MOUTH DAILY 90 tablet 3    gabapentin (NEURONTIN) 100 MG capsule TAKE 1 CAPSULE BY MOUTH THREE TIMES DAILY 270 capsule 3    lisinopril (PRINIVIL;ZESTRIL) 10 MG tablet TAKE 1 TABLET BY MOUTH TWICE DAILY 180 tablet 3    donepezil (ARICEPT) 10 MG tablet TAKE 1 TABLET BY MOUTH EVERY NIGHT AT BEDTIME 90 tablet 3    ONE TOUCH ULTRA TEST strip USE TO TEST EVERY DAY AS DIRECTED 100 strip 11    ONETOUCH DELICA LANCETS FINE MISC TEST ONCE DAILY AS DIRECTED 100 each 11    metFORMIN (GLUCOPHAGE) 500 MG tablet TAKE 1 TABLET BY MOUTH TWICE DAILY WITH MEALS 180 tablet 3    hydrochlorothiazide (MICROZIDE) 12.5 MG capsule TAKE 1 CAPSULE BY MOUTH EVERY MORNING 90 capsule 3    metoprolol succinate (TOPROL XL) 25 MG extended release tablet TAKE 1 TABLET BY MOUTH DAILY 90 tablet 3    clopidogrel (PLAVIX) 75 MG tablet TAKE ONE TABLET BY MOUTH DAILY 90 tablet 3    montelukast (SINGULAIR) 10 MG tablet Take 10 mg by mouth nightly      fluticasone (FLONASE) 50 MCG/ACT nasal spray 2 sprays by Nasal route daily 1 Bottle 1    omeprazole (PRILOSEC) 40 MG delayed release capsule Take 40 mg by mouth daily      Glucosamine-Chondroitin (GLUCOSAMINE CHONDR COMPLEX PO) Take  by mouth daily.  Multiple Vitamins-Minerals (CENTRUM SILVER) TABS Take 1 tablet by mouth daily.  aspirin 81 MG EC tablet Take 81 mg by mouth daily. No current facility-administered medications on file prior to visit. Objective:   Physical Exam   Constitutional: She is oriented to person, place, and time. She appears well-developed and well-nourished. /70   Pulse 73   Wt 175 lb (79.4 kg)   SpO2 96%   BMI 28.25 kg/m²      Neck: No JVD present. No thyromegaly present. Cardiovascular: Normal rate, regular rhythm and normal heart sounds. Pulmonary/Chest: Effort normal and breath sounds normal.   Genitourinary:   Genitourinary Comments: Pt reassured that the prior skin laceration on her buttock has healed   Musculoskeletal: Normal range of motion. She exhibits no edema or tenderness. Neurological: She is alert and oriented to person, place, and time. She has normal reflexes. Psychiatric: She has a normal mood and affect. Her behavior is normal.   Nursing note and vitals reviewed.     Lab Results   Component Value Date    WBC 5.8 04/09/2019    HGB 11.7 (L) 04/09/2019    HCT 35.2 (L) 04/09/2019    MCV 98.1 04/09/2019     04/09/2019     Lab Results   Component Value Date     04/09/2019    K 4.5 04/09/2019     04/09/2019    CO2 27 04/09/2019    BUN 20 04/09/2019    CREATININE 0.9 04/09/2019    GLUCOSE 113 (H) 04/09/2019    CALCIUM 9.8 04/09/2019    PROT 7.7 04/09/2019    LABALBU 3.7 04/09/2019    BILITOT <0.2 04/09/2019    ALKPHOS 65 04/09/2019    AST 20 04/09/2019    ALT 13 04/09/2019    LABGLOM 59 (A) 04/09/2019    GFRAA >60 04/09/2019    AGRATIO 0.9 (L) 04/09/2019    GLOB 4.0 04/09/2019       Lab Results   Component Value Date    CHOL 146 04/09/2019    CHOL 149 10/08/2018    CHOL 175 04/11/2018     Lab Results   Component Value Date    TRIG 97 04/09/2019    TRIG 102 10/08/2018    TRIG 97 04/11/2018     Lab Results   Component Value Date    HDL 54 04/09/2019    HDL 47 10/08/2018    HDL 71 (H) 04/11/2018     Lab Results   Component Value Date    LDLCALC 73 04/09/2019    LDLCALC 82 10/08/2018    LDLCALC 85 04/11/2018 Lab Results   Component Value Date    LABVLDL 19 04/09/2019    LABVLDL 20 10/08/2018    LABVLDL 19 04/11/2018     No results found for: CHOLHDLRATIO    UA-+ WBC + nitrite - blood    Assessment:      Patient Active Problem List   Diagnosis    Diabetes mellitus type 2, controlled ([address]    Pure hypercholesterolemia    Gait disorder    TIA (transient ischemic attack)    Abnormal mammogram    Diabetic neuropathy (HCC)    Low back pain    Meningioma (HCC)    Impacted cerumen    Perennial allergic rhinitis    Poor memory    Chronic external ear infection    Left shoulder pain    Dysphagia    Cyst    Laryngopharyngeal reflux disease    Chronic eczematous otitis externa of both ears    Thyroid nodule    Risk for falls    Heart palpitations    Hypertension    Recurrent sinusitis    Bilateral impacted cerumen           Plan:      continue current meds  Bactrim DS BID x 10 days for UTI    F/u in 6 months with labs prior          Pearl Mar MD

## 2019-06-12 ENCOUNTER — OFFICE VISIT (OUTPATIENT)
Dept: ENT CLINIC | Age: 84
End: 2019-06-12
Payer: MEDICARE

## 2019-06-12 VITALS — OXYGEN SATURATION: 98 % | HEART RATE: 69 BPM | SYSTOLIC BLOOD PRESSURE: 122 MMHG | DIASTOLIC BLOOD PRESSURE: 64 MMHG

## 2019-06-12 DIAGNOSIS — H61.23 BILATERAL IMPACTED CERUMEN: ICD-10-CM

## 2019-06-12 DIAGNOSIS — E04.1 THYROID NODULE: Primary | ICD-10-CM

## 2019-06-12 PROCEDURE — 69210 REMOVE IMPACTED EAR WAX UNI: CPT | Performed by: OTOLARYNGOLOGY

## 2019-06-12 PROCEDURE — 99212 OFFICE O/P EST SF 10 MIN: CPT | Performed by: OTOLARYNGOLOGY

## 2019-06-12 NOTE — PROGRESS NOTES
Patient has been noticing some pressure sensation in both of her ears which she equates to cerumen accumulation. She has had no pain and no change in tinnitus. She has had no vertigo. No fever is been noted. She has noted some slight difficulty when she swallows but no voice change or difficulty with stridor. Currently, she appears in no acute distress. Ear examination does reveal some moderate accumulation of cerumen which is removed with curettes and peroxide. The tympanic membranes both appear to be normal and ear canals are now clear. Oral examination is unremarkable. The nasal mucosa is normal.  The neck is free of any adenopathy or mass. There is, however, a 2 cm nodule present on the right thyroid lobe which is mobile and nontender and not stony hard. No other nodule is noted. Patient denies any previous knowledge of such a finding. At this point, I do feel that a thyroid sonogram is essential.  This will be scheduled.

## 2019-06-17 ENCOUNTER — HOSPITAL ENCOUNTER (OUTPATIENT)
Dept: ULTRASOUND IMAGING | Age: 84
Discharge: HOME | End: 2019-06-17
Payer: MEDICARE

## 2019-06-17 DIAGNOSIS — E04.1 THYROID NODULE: ICD-10-CM

## 2019-06-17 PROCEDURE — 76536 US EXAM OF HEAD AND NECK: CPT

## 2019-08-12 ENCOUNTER — OFFICE VISIT (OUTPATIENT)
Dept: PRIMARY CARE CLINIC | Age: 84
End: 2019-08-12
Payer: MEDICARE

## 2019-08-12 ENCOUNTER — HOSPITAL ENCOUNTER (OUTPATIENT)
Dept: GENERAL RADIOLOGY | Age: 84
Discharge: HOME | End: 2019-08-12
Payer: MEDICARE

## 2019-08-12 ENCOUNTER — HOSPITAL ENCOUNTER (OUTPATIENT)
Age: 84
Discharge: HOME | End: 2019-08-12
Payer: MEDICARE

## 2019-08-12 VITALS
TEMPERATURE: 98.2 F | DIASTOLIC BLOOD PRESSURE: 60 MMHG | SYSTOLIC BLOOD PRESSURE: 114 MMHG | HEART RATE: 76 BPM | BODY MASS INDEX: 26.15 KG/M2 | WEIGHT: 162 LBS | OXYGEN SATURATION: 99 %

## 2019-08-12 DIAGNOSIS — I10 ESSENTIAL HYPERTENSION: ICD-10-CM

## 2019-08-12 DIAGNOSIS — M25.562 PAIN IN JOINT OF LEFT KNEE: ICD-10-CM

## 2019-08-12 DIAGNOSIS — E11.9 CONTROLLED TYPE 2 DIABETES MELLITUS WITHOUT COMPLICATION, WITHOUT LONG-TERM CURRENT USE OF INSULIN (HCC): ICD-10-CM

## 2019-08-12 DIAGNOSIS — G45.2 MULTIPLE AND BILATERAL PRECEREBRAL ARTERY SYNDROMES: ICD-10-CM

## 2019-08-12 DIAGNOSIS — G30.1 LATE ONSET ALZHEIMER'S DISEASE WITHOUT BEHAVIORAL DISTURBANCE (HCC): ICD-10-CM

## 2019-08-12 DIAGNOSIS — F02.80 LATE ONSET ALZHEIMER'S DISEASE WITHOUT BEHAVIORAL DISTURBANCE (HCC): ICD-10-CM

## 2019-08-12 DIAGNOSIS — M25.562 PAIN IN JOINT OF LEFT KNEE: Primary | ICD-10-CM

## 2019-08-12 PROCEDURE — 99213 OFFICE O/P EST LOW 20 MIN: CPT | Performed by: INTERNAL MEDICINE

## 2019-08-12 PROCEDURE — 73562 X-RAY EXAM OF KNEE 3: CPT

## 2019-08-12 RX ORDER — DONEPEZIL HYDROCHLORIDE 10 MG/1
TABLET, FILM COATED ORAL
Qty: 90 TABLET | Refills: 3 | Status: SHIPPED | OUTPATIENT
Start: 2019-08-12 | End: 2020-07-23 | Stop reason: SDUPTHER

## 2019-08-12 RX ORDER — LISINOPRIL 10 MG/1
TABLET ORAL
Qty: 180 TABLET | Refills: 3 | Status: SHIPPED | OUTPATIENT
Start: 2019-08-12 | End: 2020-07-23 | Stop reason: SDUPTHER

## 2019-08-12 RX ORDER — CLOPIDOGREL BISULFATE 75 MG/1
TABLET ORAL
Qty: 90 TABLET | Refills: 3 | Status: SHIPPED | OUTPATIENT
Start: 2019-08-12 | End: 2020-07-23 | Stop reason: SDUPTHER

## 2019-08-12 RX ORDER — NABUMETONE 500 MG/1
500 TABLET, FILM COATED ORAL 2 TIMES DAILY
Qty: 60 TABLET | Refills: 3 | Status: SHIPPED | OUTPATIENT
Start: 2019-08-12 | End: 2020-04-24

## 2019-08-26 ENCOUNTER — OFFICE VISIT (OUTPATIENT)
Dept: PRIMARY CARE CLINIC | Age: 84
End: 2019-08-26
Payer: MEDICARE

## 2019-08-26 VITALS
BODY MASS INDEX: 25.92 KG/M2 | DIASTOLIC BLOOD PRESSURE: 68 MMHG | HEART RATE: 76 BPM | TEMPERATURE: 97.7 F | OXYGEN SATURATION: 99 % | WEIGHT: 160.6 LBS | SYSTOLIC BLOOD PRESSURE: 122 MMHG

## 2019-08-26 DIAGNOSIS — M17.12 PRIMARY OSTEOARTHRITIS OF LEFT KNEE: Primary | ICD-10-CM

## 2019-08-26 PROCEDURE — 99213 OFFICE O/P EST LOW 20 MIN: CPT | Performed by: INTERNAL MEDICINE

## 2019-08-26 NOTE — PROGRESS NOTES
Chief Complaint   Patient presents with    Knee Pain     2 week f/u, states that the tenderness in the L knee is much better, states she feels like it is very weak when she stands     Subjective:      Patient ID: Lorna Foster is a 80 y.o. female. HPI-knee x-ray c/w DJD, improved on nabumetone  Requests physical therapy referral due to \"weakness\"    Review of Systems     Current Outpatient Medications on File Prior to Visit   Medication Sig Dispense Refill    metFORMIN (GLUCOPHAGE) 500 MG tablet TAKE 1 TABLET BY MOUTH TWICE DAILY WITH MEALS 180 tablet 3    clopidogrel (PLAVIX) 75 MG tablet TAKE ONE TABLET BY MOUTH DAILY 90 tablet 3    lisinopril (PRINIVIL;ZESTRIL) 10 MG tablet TAKE 1 TABLET BY MOUTH TWICE DAILY 180 tablet 3    donepezil (ARICEPT) 10 MG tablet TAKE 1 TABLET BY MOUTH EVERY NIGHT AT BEDTIME 90 tablet 3    ONETOUCH DELICA LANCETS FINE MISC TEST ONCE DAILY AS DIRECTED 100 each 11    blood glucose test strips (ONE TOUCH ULTRA TEST) strip USE TO TEST EVERY DAY AS DIRECTED 100 strip 11    nabumetone (RELAFEN) 500 MG tablet Take 1 tablet by mouth 2 times daily 60 tablet 3    hydrochlorothiazide (MICROZIDE) 12.5 MG capsule TAKE ONE CAPSULE BY MOUTH EVERY MORNING 90 capsule 3    pravastatin (PRAVACHOL) 40 MG tablet TAKE ONE TABLET BY MOUTH DAILY 90 tablet 3    metoprolol succinate (TOPROL XL) 25 MG extended release tablet TAKE 1 TABLET BY MOUTH DAILY 90 tablet 3    montelukast (SINGULAIR) 10 MG tablet Take 10 mg by mouth nightly      fluticasone (FLONASE) 50 MCG/ACT nasal spray 2 sprays by Nasal route daily 1 Bottle 1    omeprazole (PRILOSEC) 40 MG delayed release capsule Take 40 mg by mouth daily      Glucosamine-Chondroitin (GLUCOSAMINE CHONDR COMPLEX PO) Take  by mouth daily.  Multiple Vitamins-Minerals (CENTRUM SILVER) TABS Take 1 tablet by mouth daily.  aspirin 81 MG EC tablet Take 81 mg by mouth daily.         gabapentin (NEURONTIN) 100 MG capsule TAKE 1 CAPSULE BY MOUTH falls    Heart palpitations    Hypertension    Recurrent sinusitis    Bilateral impacted cerumen           Plan:      Physical therapy referral due to c/o leg weakness    Taper nabumetone as tolerated  Topicals and Tylenol prn    Erlin Sahu MD

## 2019-09-11 ENCOUNTER — HOSPITAL ENCOUNTER (OUTPATIENT)
Dept: PHYSICAL THERAPY | Age: 84
Setting detail: THERAPIES SERIES
Discharge: HOME | End: 2019-09-11
Payer: MEDICARE

## 2019-09-11 VITALS — WEIGHT: 162 LBS | BODY MASS INDEX: 26.03 KG/M2 | HEIGHT: 66 IN

## 2019-09-11 PROCEDURE — 97140 MANUAL THERAPY 1/> REGIONS: CPT

## 2019-09-11 PROCEDURE — 97530 THERAPEUTIC ACTIVITIES: CPT

## 2019-09-11 PROCEDURE — 97161 PT EVAL LOW COMPLEX 20 MIN: CPT

## 2019-09-11 PROCEDURE — 97110 THERAPEUTIC EXERCISES: CPT

## 2019-09-11 PROCEDURE — 97116 GAIT TRAINING THERAPY: CPT

## 2019-09-11 ASSESSMENT — PAIN DESCRIPTION - PROGRESSION: CLINICAL_PROGRESSION: GRADUALLY WORSENING

## 2019-09-11 ASSESSMENT — PAIN DESCRIPTION - PAIN TYPE: TYPE: CHRONIC PAIN

## 2019-09-11 ASSESSMENT — PAIN DESCRIPTION - FREQUENCY: FREQUENCY: INTERMITTENT

## 2019-09-11 ASSESSMENT — PAIN DESCRIPTION - DESCRIPTORS: DESCRIPTORS: PRESSURE;ACHING

## 2019-09-11 ASSESSMENT — PAIN DESCRIPTION - ONSET: ONSET: ON-GOING

## 2019-09-11 ASSESSMENT — PAIN SCALES - GENERAL: PAINLEVEL_OUTOF10: 7

## 2019-09-11 ASSESSMENT — PAIN DESCRIPTION - ORIENTATION: ORIENTATION: LEFT

## 2019-09-11 ASSESSMENT — PAIN - FUNCTIONAL ASSESSMENT: PAIN_FUNCTIONAL_ASSESSMENT: PREVENTS OR INTERFERES WITH MANY ACTIVE NOT PASSIVE ACTIVITIES

## 2019-09-11 ASSESSMENT — PAIN DESCRIPTION - LOCATION: LOCATION: KNEE

## 2019-09-11 NOTE — FLOWSHEET NOTE
ambulation. [] UE / Cervical: cervical, postural, scapular, scapulothoracic and UE control with self care, reaching, carrying, lifting, house/yardwork, driving, computer work.  [] (20264) Provided verbal/tactile cueing for activities related to improving balance, coordination, kinesthetic sense, posture, motor skill, proprioception to assist with   [] LE / lumbar: LE, proximal hip, and core control in self care, mobility, lifting, ambulation and eccentric single leg control. [] UE / cervical: cervical, scapular, scapulothoracic and UE control with self care, reaching, carrying, lifting, house/yardwork, driving, computer work.   [] (47310) Therapist is in constant attendance of 2 or more patients providing skilled therapy interventions, but not providing any significant amount of measurable one-on-one time to either patient, for improvements in  [] LE / lumbar: LE, proximal hip, and core control in self care, mobility, lifting, ambulation and eccentric single leg control. [] UE / cervical: cervical, scapular, scapulothoracic and UE control with self care, reaching, carrying, lifting, house/yardwork, driving, computer work.      NMR and Therapeutic Activities:    [x] (85912 or 15336) Provided verbal/tactile cueing for activities related to improving balance, coordination, kinesthetic sense, posture, motor skill, proprioception and motor activation to allow for proper function of   [x] LE: / Lumbar core, proximal hip and LE with self care and ADLs  [] UE / Cervical: cervical, postural, scapular, scapulothoracic and UE control with self care, carrying, lifting, driving, computer work.   [] (81570) Gait Re-education- Provided training and instruction to the patient for proper LE, core and proximal hip recruitment and positioning and eccentric body weight control with ambulation re-education including up and down stairs     Home Management Training / Self Care:  [] (65843) Provided self-care/home management training related to activities of daily living and compensatory training, and/or use of adaptive equipment for improvement with: ADLs and compensatory training, meal preparation, safety procedures and instruction in use of adaptive equipment, including bathing, grooming, dressing, personal hygiene, basic household cleaning and chores. Home Exercise Program:    [x] (33389) Reviewed/Progressed HEP activities related to strengthening, flexibility, endurance, ROM of   [] LE / Lumbar: core, proximal hip and LE for functional self-care, mobility, lifting and ambulation/stair navigation   [] UE / Cervical: cervical, postural, scapular, scapulothoracic and UE control with self care, reaching, carrying, lifting, house/yardwork, driving, computer work  [] (59753)Reviewed/Progressed HEP activities related to improving balance, coordination, kinesthetic sense, posture, motor skill, proprioception of   [] LE: core, proximal hip and LE for self care, mobility, lifting, and ambulation/stair navigation    [] UE / Cervical: cervical, postural,  scapular, scapulothoracic and UE control with self care, reaching, carrying, lifting, house/yardwork, driving, computer work    Manual Treatments:  PROM / STM / Oscillations-Mobs:  G-I, II, III, IV (PA's, Inf., Post.)  [] (66241) Provided manual therapy to mobilize LE, proximal hip and/or LS spine soft tissue/joints for the purpose of modulating pain, promoting relaxation,  increasing ROM, reducing/eliminating soft tissue swelling/inflammation/restriction, improving soft tissue extensibility and allowing for proper ROM for normal function with   [] LE / lumbar: self care, mobility, lifting and ambulation. [] UE / Cervical: self care, reaching, carrying, lifting, house/yardwork, driving, computer work.      Modalities:  [] (38197) Vasopneumatic compression: Utilized vasopneumatic compression to decrease edema / swelling for the purpose of improving mobility and quad tone / recruitment which will

## 2019-09-13 ENCOUNTER — HOSPITAL ENCOUNTER (OUTPATIENT)
Dept: PHYSICAL THERAPY | Age: 84
Setting detail: THERAPIES SERIES
Discharge: HOME | End: 2019-09-13
Payer: MEDICARE

## 2019-09-13 PROCEDURE — 97110 THERAPEUTIC EXERCISES: CPT

## 2019-09-13 PROCEDURE — 97140 MANUAL THERAPY 1/> REGIONS: CPT

## 2019-09-13 NOTE — FLOWSHEET NOTE
marching, hip add set; Patient demonstrated understanding;  Patient provided with written and illustrated instructions for HEP    Therapeutic Exercise and NMR EXR  [x] (76827) Provided verbal/tactile cueing for activities related to strengthening, flexibility, endurance, ROM for improvements in  [] LE / Lumbar: LE, proximal hip, and core control with self care, mobility, lifting, ambulation. [] UE / Cervical: cervical, postural, scapular, scapulothoracic and UE control with self care, reaching, carrying, lifting, house/yardwork, driving, computer work.  [] (42351) Provided verbal/tactile cueing for activities related to improving balance, coordination, kinesthetic sense, posture, motor skill, proprioception to assist with   [] LE / lumbar: LE, proximal hip, and core control in self care, mobility, lifting, ambulation and eccentric single leg control. [] UE / cervical: cervical, scapular, scapulothoracic and UE control with self care, reaching, carrying, lifting, house/yardwork, driving, computer work.   [] (22976) Therapist is in constant attendance of 2 or more patients providing skilled therapy interventions, but not providing any significant amount of measurable one-on-one time to either patient, for improvements in  [] LE / lumbar: LE, proximal hip, and core control in self care, mobility, lifting, ambulation and eccentric single leg control. [] UE / cervical: cervical, scapular, scapulothoracic and UE control with self care, reaching, carrying, lifting, house/yardwork, driving, computer work.      NMR and Therapeutic Activities:    [x] (68507 or 33193) Provided verbal/tactile cueing for activities related to improving balance, coordination, kinesthetic sense, posture, motor skill, proprioception and motor activation to allow for proper function of   [x] LE: / Lumbar core, proximal hip and LE with self care and ADLs  [] UE / Cervical: cervical, postural, scapular, scapulothoracic and UE control with self for normal function with   [] LE / lumbar: self care, mobility, lifting and ambulation. [] UE / Cervical: self care, reaching, carrying, lifting, house/yardwork, driving, computer work. Modalities:  [] (48388) Vasopneumatic compression: Utilized vasopneumatic compression to decrease edema / swelling for the purpose of improving mobility and quad tone / recruitment which will allow for increased overall function including but not limited to self-care, transfers, ambulation, and ascending / descending stairs. Modalities:      Charges:  Timed Code Treatment Minutes: 45   Total Treatment Minutes: 45     [] EVAL - LOW (00712)   [] EVAL - MOD (44947)  [] EVAL - HIGH (63046)  [] RE-EVAL (36497)  [x] TE (33530) x   2   [] Ionto  [] NMR (39013) x      [] Vaso  [x] Manual (11160) x 1     [] Ultrasound  [] TA x       [] Mech Traction (19621)  [] Gait Training x     [] ES (un) (25681):   [] Aquatic therapy x   [] Other:   [] Group:     GOALS:   Long term goals  Time Frame for Long term goals : within 12 visits  Long term goal 1: Patient to demonstrate at least a 15 second improvement on TUG and a 6 point improvement on Tinetti to lessen overall fall risk  Long term goal 2: Patient to demonstrate a 10 degree improvement in bilateral knee AROM to improve ability to get in and out of chairs and car  Long term goal 3: Patient to demonstrate improved strength at bilateral hips and knees to at least 4/5 within available ROM to improve overall function and ability to ambulate with the AAD    Overall Progression Towards Functional goals/ Treatment Progress Update:  [] Patient is progressing as expected towards functional goals listed. [] Progression is slowed due to complexities/Impairments listed. [] Progression has been slowed due to co-morbidities.   [x] Plan just implemented, too soon to assess goals progression <30days   [] Goals require adjustment due to lack of progress  [] Patient is not progressing as expected

## 2019-09-18 ENCOUNTER — HOSPITAL ENCOUNTER (OUTPATIENT)
Dept: PHYSICAL THERAPY | Age: 84
Setting detail: THERAPIES SERIES
Discharge: HOME | End: 2019-09-18
Payer: MEDICARE

## 2019-09-18 PROCEDURE — 97140 MANUAL THERAPY 1/> REGIONS: CPT

## 2019-09-18 PROCEDURE — 97110 THERAPEUTIC EXERCISES: CPT

## 2019-09-18 NOTE — FLOWSHEET NOTE
[facility] - Outpatient Physical Therapy    Physical Therapy Daily Treatment Note  Date:  2019    Patient Name:  Lauri Aase    :  1931  MRN: [id number]  Medical/Treatment Diagnosis Information:  · Diagnosis: left knee OA  · Treatment Diagnosis: difficulty walking due to OA, worse in the left knee, but present in both and in the spine  Insurance/Certification information:  PT Insurance Information: [employer] Medicare---$40 copay, no visit limit  Physician Information:  Referring Practitioner: Dr. George Hook of care signed (Y/N): sent    Date of Patient follow up with Physician:     Functional scale[de-identified] LEFS    Progress Note: []  Yes  [x]  No  Next due by: Visit #10       Latex Allergy:  [x]NO      []YES  Preferred Language for Healthcare:   [x]English       []other:    Visit # Insurance Allowable Date Range (if applicable)    60      Pain level:  7/10     SUBJECTIVE:  Patient reports compliance and understanding of HEP.   No significant change in pain experience in her left knee    OBJECTIVE: See eval      RESTRICTIONS/PRECAUTIONS: fall risk    Exercises/Interventions:     Therapeutic Exercises (21564) Resistance / level Sets/sec Reps Notes   QS    Reclined-B   Seated hip abd with TB yellow 1 20    Supine SAQ  1 10 B   Seated LAQ  1 10 B   Seated marching  1 10 B   Seated hip add set  1 10    Supine ankle pumps  1 20  B   Seated ham curls with TB Yellow TB 1 10 B   Seated stepper 1 5 min     Therapeutic Activities (85441)       Gait training with rollator 2 x 60 feet with cues for posture, kailyn and step length                                   Neuromuscular Re-ed (65698)                                                 Manual Intervention (10649)       Patellar mobs inf and med grade 3--left  5 15    STM at joint line left  10 min                                     Pt. Education:  -patient educated on diagnosis, prognosis and expectations for rehab  -all patient questions

## 2019-09-20 ENCOUNTER — HOSPITAL ENCOUNTER (OUTPATIENT)
Dept: PHYSICAL THERAPY | Age: 84
Setting detail: THERAPIES SERIES
Discharge: HOME | End: 2019-09-20
Payer: MEDICARE

## 2019-09-20 PROCEDURE — 97110 THERAPEUTIC EXERCISES: CPT

## 2019-09-20 NOTE — FLOWSHEET NOTE
[facility] - Outpatient Physical Therapy    Physical Therapy Daily Treatment Note  Date:  2019    Patient Name:  Jamir Curtis    :  1931  MRN: [id number]  Medical/Treatment Diagnosis Information:  · Diagnosis: left knee OA  · Treatment Diagnosis: difficulty walking due to OA, worse in the left knee, but present in both and in the spine  Insurance/Certification information:  PT Insurance Information: [employer] Medicare---$40 copay, no visit limit  Physician Information:  Referring Practitioner: Dr. Mariangel Lowry of care signed (Y/N): sent    Date of Patient follow up with Physician:     Functional scale[de-identified] LEFS    Progress Note: []  Yes  [x]  No  Next due by: Visit #10       Latex Allergy:  [x]NO      []YES  Preferred Language for Healthcare:   [x]English       []other:    Visit # Insurance Allowable Date Range (if applicable)         Pain level:  7/10     SUBJECTIVE:  Pain level unchanged in weightbearing but patient does say that her knee does seem to feel a little better since starting PT.    OBJECTIVE: See eval      RESTRICTIONS/PRECAUTIONS: fall risk    Exercises/Interventions:     Therapeutic Exercises (71879) Resistance / level Sets/sec Reps Notes   QS    Reclined-B   Seated hip abd with TB yellow 1 20    Supine SAQ  1 10 B   Seated LAQ  1 10 B   Seated marching  1 10 B   Seated hip add set  1 10    Supine ankle pumps  1 20  B   Seated ham curls with TB Yellow TB 1 10 B   Seated stepper 1  min     Therapeutic Activities (54115)       Gait training with rollator 2 x 60 feet with cues for posture, kailyn and step length                                   Neuromuscular Re-ed (06087)       Sit to stand repeat from elevated mat  1 10 With ball between knees, B   SLR  1 10 B                               Manual Intervention ([phone])       Patellar mobs inf and med grade 3--left       STM at joint line left                                       Pt. Education:  -patient educated on diagnosis, prognosis and expectations for rehab  -all patient questions were answered    HEP instruction:  9/13:  Distributed the following exercises for HEP: QS, SAQ, LAQ, marching, hip add set; Patient demonstrated understanding;  Patient provided with written and illustrated instructions for HEP    Therapeutic Exercise and NMR EXR  [x] (76404) Provided verbal/tactile cueing for activities related to strengthening, flexibility, endurance, ROM for improvements in  [] LE / Lumbar: LE, proximal hip, and core control with self care, mobility, lifting, ambulation. [] UE / Cervical: cervical, postural, scapular, scapulothoracic and UE control with self care, reaching, carrying, lifting, house/yardwork, driving, computer work.  [] (22659) Provided verbal/tactile cueing for activities related to improving balance, coordination, kinesthetic sense, posture, motor skill, proprioception to assist with   [] LE / lumbar: LE, proximal hip, and core control in self care, mobility, lifting, ambulation and eccentric single leg control. [] UE / cervical: cervical, scapular, scapulothoracic and UE control with self care, reaching, carrying, lifting, house/yardwork, driving, computer work.   [] (19485) Therapist is in constant attendance of 2 or more patients providing skilled therapy interventions, but not providing any significant amount of measurable one-on-one time to either patient, for improvements in  [] LE / lumbar: LE, proximal hip, and core control in self care, mobility, lifting, ambulation and eccentric single leg control. [] UE / cervical: cervical, scapular, scapulothoracic and UE control with self care, reaching, carrying, lifting, house/yardwork, driving, computer work.      NMR and Therapeutic Activities:    [x] (45392 or 17403) Provided verbal/tactile cueing for activities related to improving balance, coordination, kinesthetic sense, posture, motor skill, proprioception and motor activation to allow for proper function of   [x] LE: / Lumbar core, proximal hip and LE with self care and ADLs  [] UE / Cervical: cervical, postural, scapular, scapulothoracic and UE control with self care, carrying, lifting, driving, computer work.   [] (52450) Gait Re-education- Provided training and instruction to the patient for proper LE, core and proximal hip recruitment and positioning and eccentric body weight control with ambulation re-education including up and down stairs     Home Management Training / Self Care:  [] (46217) Provided self-care/home management training related to activities of daily living and compensatory training, and/or use of adaptive equipment for improvement with: ADLs and compensatory training, meal preparation, safety procedures and instruction in use of adaptive equipment, including bathing, grooming, dressing, personal hygiene, basic household cleaning and chores.      Home Exercise Program:    [x] (53748) Reviewed/Progressed HEP activities related to strengthening, flexibility, endurance, ROM of   [] LE / Lumbar: core, proximal hip and LE for functional self-care, mobility, lifting and ambulation/stair navigation   [] UE / Cervical: cervical, postural, scapular, scapulothoracic and UE control with self care, reaching, carrying, lifting, house/yardwork, driving, computer work  [] (09461)Reviewed/Progressed HEP activities related to improving balance, coordination, kinesthetic sense, posture, motor skill, proprioception of   [] LE: core, proximal hip and LE for self care, mobility, lifting, and ambulation/stair navigation    [] UE / Cervical: cervical, postural,  scapular, scapulothoracic and UE control with self care, reaching, carrying, lifting, house/yardwork, driving, computer work    Manual Treatments:  PROM / STM / Oscillations-Mobs:  G-I, II, III, IV (PA's, Inf., Post.)  [] (24935) Provided manual therapy to mobilize LE, proximal hip and/or LS spine soft tissue/joints for the purpose of modulating co-morbidities. [x] Plan just implemented, too soon to assess goals progression <30days   [] Goals require adjustment due to lack of progress  [] Patient is not progressing as expected and requires additional follow up with physician  [] Other    Persisting Functional Limitations/Impairments:  []Sleeping []Sitting               [x]Standing [x]Transfers        [x]Walking [x]Kneeling               [x]Stairs [x]Squatting / bending   [x]ADLs []Reaching  [x]Lifting  [x]Housework  []Driving []Job related tasks  []Sports/Recreation []Other:        ASSESSMENT:  See eval  Treatment/Activity Tolerance:  [x] Patient able to complete tx [] Patient limited by fatigue  [x] Patient limited by pain  [] Patient limited by other medical complications  [] Other:     Prognosis: [x] Good [] Fair  [] Poor    Patient Requires Follow-up: [x] Yes  [] No    PLAN: See eval. PT 2x / week for 6 weeks. [x] Continue per plan of care [] Alter current plan (see comments)  [] Plan of care initiated [] Hold pending MD visit [] Discharge    Electronically signed by: Angie Martinez PT, DPT    Note: If patient does not return for scheduled/ recommended follow up visits, his note will serve as a discharge from care along with most recent update on progress.

## 2019-09-25 ENCOUNTER — HOSPITAL ENCOUNTER (OUTPATIENT)
Dept: PHYSICAL THERAPY | Age: 84
Setting detail: THERAPIES SERIES
Discharge: HOME | End: 2019-09-25
Payer: MEDICARE

## 2019-09-25 PROCEDURE — 97140 MANUAL THERAPY 1/> REGIONS: CPT

## 2019-09-25 PROCEDURE — 97110 THERAPEUTIC EXERCISES: CPT

## 2019-09-25 NOTE — FLOWSHEET NOTE
spine soft tissue/joints for the purpose of modulating pain, promoting relaxation,  increasing ROM, reducing/eliminating soft tissue swelling/inflammation/restriction, improving soft tissue extensibility and allowing for proper ROM for normal function with   [x] LE / lumbar: self care, mobility, lifting and ambulation. [] UE / Cervical: self care, reaching, carrying, lifting, house/yardwork, driving, computer work. Modalities:  [] (21048) Vasopneumatic compression: Utilized vasopneumatic compression to decrease edema / swelling for the purpose of improving mobility and quad tone / recruitment which will allow for increased overall function including but not limited to self-care, transfers, ambulation, and ascending / descending stairs. Modalities:      Charges:  Timed Code Treatment Minutes: 46   Total Treatment Minutes: 46     [] EVAL - LOW (34873)   [] EVAL - MOD (59712)  [] EVAL - HIGH (49580)  [] RE-EVAL (06536)  [x] TE (93393) x   2   [] Ionto  [] NMR (93291) x      [] Vaso  [x] Manual (74859) x 1     [] Ultrasound  [] TA x       [] Mech Traction (18000)  [] Gait Training x     [] ES (un) (15664):   [] Aquatic therapy x   [] Other:   [] Group:     GOALS:   Long term goals  Time Frame for Long term goals : within 12 visits  Long term goal 1: Patient to demonstrate at least a 15 second improvement on TUG and a 6 point improvement on Tinetti to lessen overall fall risk  Long term goal 2: Patient to demonstrate a 10 degree improvement in bilateral knee AROM to improve ability to get in and out of chairs and car  Long term goal 3: Patient to demonstrate improved strength at bilateral hips and knees to at least 4/5 within available ROM to improve overall function and ability to ambulate with the AAD    Overall Progression Towards Functional goals/ Treatment Progress Update:  [x] Patient is progressing as expected towards functional goals listed.     [] Progression is slowed due to complexities/Impairments

## 2019-09-27 ENCOUNTER — APPOINTMENT (OUTPATIENT)
Dept: PHYSICAL THERAPY | Age: 84
End: 2019-09-27
Payer: MEDICARE

## 2019-10-02 ENCOUNTER — HOSPITAL ENCOUNTER (OUTPATIENT)
Dept: PHYSICAL THERAPY | Age: 84
Setting detail: THERAPIES SERIES
Discharge: HOME | End: 2019-10-02
Payer: MEDICARE

## 2019-10-02 PROCEDURE — 97140 MANUAL THERAPY 1/> REGIONS: CPT

## 2019-10-02 PROCEDURE — 97110 THERAPEUTIC EXERCISES: CPT

## 2019-10-04 ENCOUNTER — HOSPITAL ENCOUNTER (OUTPATIENT)
Dept: PHYSICAL THERAPY | Age: 84
Setting detail: THERAPIES SERIES
Discharge: HOME | End: 2019-10-04
Payer: MEDICARE

## 2019-10-04 PROCEDURE — 97110 THERAPEUTIC EXERCISES: CPT

## 2019-10-14 ENCOUNTER — OFFICE VISIT (OUTPATIENT)
Dept: PRIMARY CARE CLINIC | Age: 84
End: 2019-10-14
Payer: MEDICARE

## 2019-10-14 VITALS
SYSTOLIC BLOOD PRESSURE: 114 MMHG | DIASTOLIC BLOOD PRESSURE: 72 MMHG | TEMPERATURE: 98.1 F | BODY MASS INDEX: 25.08 KG/M2 | HEART RATE: 75 BPM | WEIGHT: 155.4 LBS | OXYGEN SATURATION: 96 %

## 2019-10-14 DIAGNOSIS — R00.2 HEART PALPITATIONS: ICD-10-CM

## 2019-10-14 DIAGNOSIS — E11.21 CONTROLLED TYPE 2 DIABETES MELLITUS WITH DIABETIC NEPHROPATHY, WITHOUT LONG-TERM CURRENT USE OF INSULIN (HCC): Primary | ICD-10-CM

## 2019-10-14 PROCEDURE — 99213 OFFICE O/P EST LOW 20 MIN: CPT | Performed by: INTERNAL MEDICINE

## 2019-10-14 RX ORDER — METOPROLOL SUCCINATE 25 MG/1
TABLET, EXTENDED RELEASE ORAL
Qty: 90 TABLET | Refills: 3 | Status: SHIPPED | OUTPATIENT
Start: 2019-10-14 | End: 2020-10-26

## 2019-10-14 ASSESSMENT — PATIENT HEALTH QUESTIONNAIRE - PHQ9
SUM OF ALL RESPONSES TO PHQ9 QUESTIONS 1 & 2: 0
1. LITTLE INTEREST OR PLEASURE IN DOING THINGS: 0
2. FEELING DOWN, DEPRESSED OR HOPELESS: 0
SUM OF ALL RESPONSES TO PHQ QUESTIONS 1-9: 0
SUM OF ALL RESPONSES TO PHQ QUESTIONS 1-9: 0

## 2019-10-15 ENCOUNTER — HOSPITAL ENCOUNTER (OUTPATIENT)
Age: 84
Discharge: HOME | End: 2019-10-15
Payer: MEDICARE

## 2019-10-15 DIAGNOSIS — E11.21 CONTROLLED TYPE 2 DIABETES MELLITUS WITH DIABETIC NEPHROPATHY, WITHOUT LONG-TERM CURRENT USE OF INSULIN (HCC): ICD-10-CM

## 2019-10-15 LAB
A/G RATIO: 1 (ref 1.1–2.2)
ALBUMIN SERPL-MCNC: 4 G/DL (ref 3.4–5)
ALP BLD-CCNC: 74 U/L (ref 40–129)
ALT SERPL-CCNC: 8 U/L (ref 10–40)
ANION GAP SERPL CALCULATED.3IONS-SCNC: 15 MMOL/L (ref 3–16)
AST SERPL-CCNC: 21 U/L (ref 15–37)
BASOPHILS ABSOLUTE: 0.1 K/UL (ref 0–0.2)
BASOPHILS RELATIVE PERCENT: 0.6 %
BILIRUB SERPL-MCNC: 0.5 MG/DL (ref 0–1)
BUN BLDV-MCNC: 20 MG/DL (ref 7–20)
CALCIUM SERPL-MCNC: 10.1 MG/DL (ref 8.3–10.6)
CHLORIDE BLD-SCNC: 107 MMOL/L (ref 99–110)
CHOLESTEROL, FASTING: 147 MG/DL (ref 0–199)
CO2: 25 MMOL/L (ref 21–32)
CREAT SERPL-MCNC: 1 MG/DL (ref 0.6–1.2)
EOSINOPHILS ABSOLUTE: 0.1 K/UL (ref 0–0.6)
EOSINOPHILS RELATIVE PERCENT: 0.8 %
GFR AFRICAN AMERICAN: >60
GFR NON-AFRICAN AMERICAN: 52
GLOBULIN: 3.9 G/DL
GLUCOSE FASTING: 96 MG/DL (ref 70–99)
HCT VFR BLD CALC: 31.6 % (ref 36–48)
HDLC SERPL-MCNC: 44 MG/DL (ref 40–60)
HEMOGLOBIN: 10 G/DL (ref 12–16)
LDL CHOLESTEROL CALCULATED: 83 MG/DL
LYMPHOCYTES ABSOLUTE: 1.7 K/UL (ref 1–5.1)
LYMPHOCYTES RELATIVE PERCENT: 13 %
MCH RBC QN AUTO: 30.6 PG (ref 26–34)
MCHC RBC AUTO-ENTMCNC: 31.5 G/DL (ref 31–36)
MCV RBC AUTO: 97.2 FL (ref 80–100)
MONOCYTES ABSOLUTE: 0.6 K/UL (ref 0–1.3)
MONOCYTES RELATIVE PERCENT: 4.7 %
NEUTROPHILS ABSOLUTE: 10.7 K/UL (ref 1.7–7.7)
NEUTROPHILS RELATIVE PERCENT: 80.9 %
PDW BLD-RTO: 16.5 % (ref 12.4–15.4)
PLATELET # BLD: 255 K/UL (ref 135–450)
PMV BLD AUTO: 8.8 FL (ref 5–10.5)
POTASSIUM SERPL-SCNC: 4.9 MMOL/L (ref 3.5–5.1)
RBC # BLD: 3.26 M/UL (ref 4–5.2)
SODIUM BLD-SCNC: 147 MMOL/L (ref 136–145)
TOTAL PROTEIN: 7.9 G/DL (ref 6.4–8.2)
TRIGLYCERIDE, FASTING: 98 MG/DL (ref 0–150)
VLDLC SERPL CALC-MCNC: 20 MG/DL
WBC # BLD: 13.2 K/UL (ref 4–11)

## 2019-10-15 PROCEDURE — 85025 COMPLETE CBC W/AUTO DIFF WBC: CPT

## 2019-10-15 PROCEDURE — 80053 COMPREHEN METABOLIC PANEL: CPT

## 2019-10-15 PROCEDURE — 83036 HEMOGLOBIN GLYCOSYLATED A1C: CPT

## 2019-10-15 PROCEDURE — 36415 COLL VENOUS BLD VENIPUNCTURE: CPT

## 2019-10-15 PROCEDURE — 80061 LIPID PANEL: CPT

## 2019-10-16 LAB
ESTIMATED AVERAGE GLUCOSE: 131.2 MG/DL
HBA1C MFR BLD: 6.2 %

## 2019-10-24 DIAGNOSIS — E11.42 DIABETIC POLYNEUROPATHY ASSOCIATED WITH TYPE 2 DIABETES MELLITUS (HCC): ICD-10-CM

## 2019-10-25 RX ORDER — GABAPENTIN 100 MG/1
CAPSULE ORAL
Qty: 270 CAPSULE | Refills: 1 | Status: SHIPPED | OUTPATIENT
Start: 2019-10-25 | End: 2020-07-23 | Stop reason: ALTCHOICE

## 2020-01-27 RX ORDER — PRAVASTATIN SODIUM 40 MG
TABLET ORAL
Qty: 87 TABLET | Refills: 2 | Status: SHIPPED | OUTPATIENT
Start: 2020-01-27 | End: 2020-10-21

## 2020-04-24 RX ORDER — NABUMETONE 500 MG/1
TABLET, FILM COATED ORAL
Qty: 60 TABLET | Refills: 2 | Status: SHIPPED | OUTPATIENT
Start: 2020-04-24 | End: 2020-07-23 | Stop reason: SDUPTHER

## 2020-07-23 ENCOUNTER — OFFICE VISIT (OUTPATIENT)
Dept: PRIMARY CARE CLINIC | Age: 85
End: 2020-07-23
Payer: MEDICARE

## 2020-07-23 VITALS
WEIGHT: 125 LBS | TEMPERATURE: 97.2 F | OXYGEN SATURATION: 97 % | RESPIRATION RATE: 17 BRPM | DIASTOLIC BLOOD PRESSURE: 70 MMHG | HEART RATE: 79 BPM | SYSTOLIC BLOOD PRESSURE: 124 MMHG | BODY MASS INDEX: 20.18 KG/M2

## 2020-07-23 PROBLEM — F02.80 LATE ONSET ALZHEIMER'S DISEASE WITHOUT BEHAVIORAL DISTURBANCE (HCC): Status: ACTIVE | Noted: 2020-07-23

## 2020-07-23 PROBLEM — G30.1 LATE ONSET ALZHEIMER'S DISEASE WITHOUT BEHAVIORAL DISTURBANCE (HCC): Status: ACTIVE | Noted: 2020-07-23

## 2020-07-23 PROCEDURE — 99214 OFFICE O/P EST MOD 30 MIN: CPT | Performed by: INTERNAL MEDICINE

## 2020-07-23 RX ORDER — NABUMETONE 500 MG/1
TABLET, FILM COATED ORAL
Qty: 60 TABLET | Refills: 2 | Status: ON HOLD
Start: 2020-07-23 | End: 2021-04-15 | Stop reason: HOSPADM

## 2020-07-23 RX ORDER — DONEPEZIL HYDROCHLORIDE 10 MG/1
TABLET, FILM COATED ORAL
Qty: 90 TABLET | Refills: 3 | Status: SHIPPED | OUTPATIENT
Start: 2020-07-23

## 2020-07-23 RX ORDER — CLOPIDOGREL BISULFATE 75 MG/1
TABLET ORAL
Qty: 90 TABLET | Refills: 3 | Status: SHIPPED | OUTPATIENT
Start: 2020-07-23

## 2020-07-23 RX ORDER — LISINOPRIL 10 MG/1
TABLET ORAL
Qty: 180 TABLET | Refills: 3 | Status: ON HOLD
Start: 2020-07-23 | End: 2021-05-06 | Stop reason: HOSPADM

## 2020-07-23 RX ORDER — HYDROCHLOROTHIAZIDE 12.5 MG/1
CAPSULE, GELATIN COATED ORAL
Qty: 90 CAPSULE | Refills: 3 | Status: SHIPPED | OUTPATIENT
Start: 2020-07-23 | End: 2021-05-10

## 2020-07-23 ASSESSMENT — ENCOUNTER SYMPTOMS
DIARRHEA: 0
ABDOMINAL PAIN: 0
CHEST TIGHTNESS: 0
BACK PAIN: 0
SHORTNESS OF BREATH: 0
COUGH: 0
NAUSEA: 0
ABDOMINAL DISTENTION: 0

## 2020-07-23 NOTE — PROGRESS NOTES
7/23/2020   [place] [facility]  1/16/1931    The patients PMH, surgical history, family history, medications, allergies were all reviewed and updated as appropriate today. Current Outpatient Medications on File Prior to Visit   Medication Sig Dispense Refill    nabumetone (RELAFEN) 500 MG tablet TAKE ONE TABLET BY MOUTH TWICE A DAY 60 tablet 2    pravastatin (PRAVACHOL) 40 MG tablet TAKE ONE TABLET BY MOUTH DAILY 87 tablet 2    metoprolol succinate (TOPROL XL) 25 MG extended release tablet TAKE 1 TABLET BY MOUTH DAILY 90 tablet 3    metFORMIN (GLUCOPHAGE) 500 MG tablet TAKE 1 TABLET BY MOUTH TWICE DAILY WITH MEALS 180 tablet 3    clopidogrel (PLAVIX) 75 MG tablet TAKE ONE TABLET BY MOUTH DAILY 90 tablet 3    lisinopril (PRINIVIL;ZESTRIL) 10 MG tablet TAKE 1 TABLET BY MOUTH TWICE DAILY 180 tablet 3    donepezil (ARICEPT) 10 MG tablet TAKE 1 TABLET BY MOUTH EVERY NIGHT AT BEDTIME 90 tablet 3    ONETOUCH DELICA LANCETS FINE MISC TEST ONCE DAILY AS DIRECTED 100 each 11    blood glucose test strips (ONE TOUCH ULTRA TEST) strip USE TO TEST EVERY DAY AS DIRECTED 100 strip 11    hydrochlorothiazide (MICROZIDE) 12.5 MG capsule TAKE ONE CAPSULE BY MOUTH EVERY MORNING 90 capsule 3    montelukast (SINGULAIR) 10 MG tablet Take 10 mg by mouth nightly      fluticasone (FLONASE) 50 MCG/ACT nasal spray 2 sprays by Nasal route daily 1 Bottle 1    omeprazole (PRILOSEC) 40 MG delayed release capsule Take 40 mg by mouth daily      Glucosamine-Chondroitin (GLUCOSAMINE CHONDR COMPLEX PO) Take  by mouth daily.  Multiple Vitamins-Minerals (CENTRUM SILVER) TABS Take 1 tablet by mouth daily.  aspirin 81 MG EC tablet Take 81 mg by mouth daily.  gabapentin (NEURONTIN) 100 MG capsule TAKE ONE CAPSULE BY MOUTH THREE TIMES A  capsule 1     No current facility-administered medications on file prior to visit.          Chief Complaint   Patient presents with    Diabetes     6 months follow up, good with refills. HPI:  Pt returns for 6 month f/u visit, last labs done 10/2019  Doesn't check BS at home, on metformin 500 BID    Review of Systems   Constitutional: Negative for appetite change, chills, fatigue and fever. Respiratory: Negative for cough, chest tightness and shortness of breath. Cardiovascular: Negative for chest pain. Gastrointestinal: Negative for abdominal distention, abdominal pain, diarrhea and nausea. Genitourinary: Negative for difficulty urinating and dysuria. Musculoskeletal: Negative for back pain. Neurological: Negative for dizziness and headaches. Psychiatric/Behavioral: Negative for agitation and behavioral problems. The patient is not nervous/anxious. OBJECTIVE:  /70 (Site: Right Upper Arm, Position: Sitting, Cuff Size: Medium Adult)   Pulse 79   Temp 97.2 °F (36.2 °C)   Resp 17   Wt 125 lb (56.7 kg)   SpO2 97%   BMI 20.18 kg/m²    Physical Exam  Vitals signs and nursing note reviewed. Constitutional:       General: She is not in acute distress. Appearance: Normal appearance. She is well-developed. HENT:      Head: Normocephalic and atraumatic. Right Ear: Tympanic membrane, ear canal and external ear normal.      Left Ear: Tympanic membrane, ear canal and external ear normal.      Nose: Nose normal. No rhinorrhea. Mouth/Throat:      Pharynx: No oropharyngeal exudate or posterior oropharyngeal erythema. Eyes:      General:         Right eye: No discharge. Left eye: No discharge. Extraocular Movements: Extraocular movements intact. Conjunctiva/sclera: Conjunctivae normal.      Pupils: Pupils are equal, round, and reactive to light. Neck:      Musculoskeletal: Normal range of motion. No muscular tenderness. Thyroid: No thyromegaly. Vascular: No carotid bruit or JVD. Cardiovascular:      Rate and Rhythm: Normal rate and regular rhythm. Pulses: Normal pulses. Heart sounds: Normal heart sounds.  No murmur. Pulmonary:      Effort: Pulmonary effort is normal. No respiratory distress. Breath sounds: Normal breath sounds. No wheezing, rhonchi or rales. Abdominal:      General: Bowel sounds are normal. There is no distension. Palpations: Abdomen is soft. Tenderness: There is no abdominal tenderness. There is no rebound. Musculoskeletal:         General: No swelling. Right lower leg: No edema. Left lower leg: No edema. Comments: FROM x 4   Lymphadenopathy:      Cervical: No cervical adenopathy. Skin:     General: Skin is warm and dry. Capillary Refill: Capillary refill takes 2 to 3 seconds. Coloration: Skin is not pale. Findings: No erythema or rash. Neurological:      Mental Status: She is alert and oriented to person, place, and time. Cranial Nerves: No cranial nerve deficit. Sensory: No sensory deficit. Motor: No abnormal muscle tone. Deep Tendon Reflexes: Reflexes normal.   Psychiatric:         Mood and Affect: Mood normal.         Behavior: Behavior normal.         Thought Content:  Thought content normal.         Judgment: Judgment normal.         Data Review:   CBC:   Lab Results   Component Value Date    WBC 13.2 10/15/2019    WBC 5.8 04/09/2019    WBC 6.0 10/08/2018    HGB 10.0 10/15/2019    HGB 11.7 04/09/2019    HGB 11.7 10/08/2018    HCT 31.6 10/15/2019    HCT 35.2 04/09/2019    HCT 35.6 10/08/2018    MCV 97.2 10/15/2019    MCV 98.1 04/09/2019    MCV 97.4 10/08/2018     10/15/2019     04/09/2019     10/08/2018     Chemistry:   Lab Results   Component Value Date     10/15/2019     04/09/2019     10/08/2018    K 4.9 10/15/2019    K 4.5 04/09/2019    K 5.0 10/08/2018     10/15/2019     04/09/2019     10/08/2018    CO2 25 10/15/2019    CO2 27 04/09/2019    CO2 26 10/08/2018    PHOS 3.7 01/17/2011    PHOS 3.7 10/26/2010    BUN 20 10/15/2019    BUN 20 04/09/2019    BUN 19

## 2020-07-28 RX ORDER — GABAPENTIN 100 MG/1
CAPSULE ORAL
Qty: 270 CAPSULE | Refills: 0 | Status: SHIPPED | OUTPATIENT
Start: 2020-07-28 | End: 2020-10-26

## 2020-07-30 DIAGNOSIS — E78.00 PURE HYPERCHOLESTEROLEMIA: ICD-10-CM

## 2020-07-30 DIAGNOSIS — E11.21 CONTROLLED TYPE 2 DIABETES MELLITUS WITH DIABETIC NEPHROPATHY, WITHOUT LONG-TERM CURRENT USE OF INSULIN (HCC): ICD-10-CM

## 2020-07-30 LAB
A/G RATIO: 1 (ref 1.1–2.2)
ALBUMIN SERPL-MCNC: 3.4 G/DL (ref 3.4–5)
ALP BLD-CCNC: 84 U/L (ref 40–129)
ALT SERPL-CCNC: 13 U/L (ref 10–40)
ANION GAP SERPL CALCULATED.3IONS-SCNC: 15 MMOL/L (ref 3–16)
AST SERPL-CCNC: 15 U/L (ref 15–37)
BILIRUB SERPL-MCNC: 0.3 MG/DL (ref 0–1)
BUN BLDV-MCNC: 19 MG/DL (ref 7–20)
CALCIUM SERPL-MCNC: 9.8 MG/DL (ref 8.3–10.6)
CHLORIDE BLD-SCNC: 105 MMOL/L (ref 99–110)
CHOLESTEROL, TOTAL: 127 MG/DL (ref 0–199)
CO2: 25 MMOL/L (ref 21–32)
CREAT SERPL-MCNC: 1.1 MG/DL (ref 0.6–1.2)
GFR AFRICAN AMERICAN: 57
GFR NON-AFRICAN AMERICAN: 47
GLOBULIN: 3.5 G/DL
GLUCOSE BLD-MCNC: 97 MG/DL (ref 70–99)
HCT VFR BLD CALC: 29.5 % (ref 36–48)
HDLC SERPL-MCNC: 40 MG/DL (ref 40–60)
HEMOGLOBIN: 9.6 G/DL (ref 12–16)
LDL CHOLESTEROL CALCULATED: 66 MG/DL
MCH RBC QN AUTO: 30.6 PG (ref 26–34)
MCHC RBC AUTO-ENTMCNC: 32.5 G/DL (ref 31–36)
MCV RBC AUTO: 94.2 FL (ref 80–100)
PDW BLD-RTO: 16.7 % (ref 12.4–15.4)
PLATELET # BLD: 278 K/UL (ref 135–450)
PMV BLD AUTO: 8.3 FL (ref 5–10.5)
POTASSIUM SERPL-SCNC: 4.3 MMOL/L (ref 3.5–5.1)
RBC # BLD: 3.13 M/UL (ref 4–5.2)
SODIUM BLD-SCNC: 145 MMOL/L (ref 136–145)
TOTAL PROTEIN: 6.9 G/DL (ref 6.4–8.2)
TRIGL SERPL-MCNC: 107 MG/DL (ref 0–150)
VLDLC SERPL CALC-MCNC: 21 MG/DL
WBC # BLD: 7.6 K/UL (ref 4–11)

## 2020-07-31 LAB
ESTIMATED AVERAGE GLUCOSE: 134.1 MG/DL
HBA1C MFR BLD: 6.3 %

## 2020-08-14 ENCOUNTER — NURSE ONLY (OUTPATIENT)
Dept: PRIMARY CARE CLINIC | Age: 85
End: 2020-08-14
Payer: MEDICARE

## 2020-08-14 LAB
CONTROL: ABNORMAL
HEMOCCULT STL QL: ABNORMAL

## 2020-08-14 PROCEDURE — 82274 ASSAY TEST FOR BLOOD FECAL: CPT | Performed by: INTERNAL MEDICINE

## 2020-08-17 ENCOUNTER — OFFICE VISIT (OUTPATIENT)
Dept: PRIMARY CARE CLINIC | Age: 85
End: 2020-08-17
Payer: MEDICARE

## 2020-08-17 VITALS
WEIGHT: 124 LBS | OXYGEN SATURATION: 97 % | SYSTOLIC BLOOD PRESSURE: 120 MMHG | BODY MASS INDEX: 20.01 KG/M2 | DIASTOLIC BLOOD PRESSURE: 66 MMHG | HEART RATE: 66 BPM | TEMPERATURE: 97.3 F

## 2020-08-17 PROCEDURE — 99213 OFFICE O/P EST LOW 20 MIN: CPT | Performed by: INTERNAL MEDICINE

## 2020-08-17 ASSESSMENT — ENCOUNTER SYMPTOMS
BACK PAIN: 0
ABDOMINAL DISTENTION: 0
SHORTNESS OF BREATH: 0
CHEST TIGHTNESS: 0
NAUSEA: 0
DIARRHEA: 0
COUGH: 0
ABDOMINAL PAIN: 0

## 2020-08-17 ASSESSMENT — PATIENT HEALTH QUESTIONNAIRE - PHQ9
2. FEELING DOWN, DEPRESSED OR HOPELESS: 0
SUM OF ALL RESPONSES TO PHQ9 QUESTIONS 1 & 2: 0
SUM OF ALL RESPONSES TO PHQ QUESTIONS 1-9: 0
SUM OF ALL RESPONSES TO PHQ QUESTIONS 1-9: 0
1. LITTLE INTEREST OR PLEASURE IN DOING THINGS: 0

## 2020-08-17 NOTE — PROGRESS NOTES
anemia on recent labs    Review of Systems   Constitutional: Negative for appetite change, chills, fatigue and fever. Respiratory: Negative for cough, chest tightness and shortness of breath. Cardiovascular: Negative for chest pain. Gastrointestinal: Negative for abdominal distention, abdominal pain, diarrhea and nausea. Genitourinary: Negative for difficulty urinating and dysuria. Musculoskeletal: Negative for back pain. Neurological: Negative for dizziness and headaches. Psychiatric/Behavioral: Negative for agitation and behavioral problems. The patient is not nervous/anxious. /66 (Site: Right Upper Arm, Position: Sitting, Cuff Size: Medium Adult)   Pulse 66   Temp 97.3 °F (36.3 °C) (Temporal)   Wt 124 lb (56.2 kg)   SpO2 97%   BMI 20.01 kg/m²     OBJECTIVE:  Temp 97.3 °F (36.3 °C) (Temporal)   Wt 124 lb (56.2 kg)   BMI 20.01 kg/m²    Physical Exam  Vitals signs and nursing note reviewed. Constitutional:       General: She is not in acute distress. Appearance: Normal appearance. She is well-developed. HENT:      Head: Normocephalic and atraumatic. Right Ear: Tympanic membrane, ear canal and external ear normal.      Left Ear: Tympanic membrane, ear canal and external ear normal.      Nose: Nose normal. No rhinorrhea. Mouth/Throat:      Pharynx: No oropharyngeal exudate or posterior oropharyngeal erythema. Eyes:      General:         Right eye: No discharge. Left eye: No discharge. Extraocular Movements: Extraocular movements intact. Conjunctiva/sclera: Conjunctivae normal.      Pupils: Pupils are equal, round, and reactive to light. Neck:      Musculoskeletal: Normal range of motion. No muscular tenderness. Thyroid: No thyromegaly. Vascular: No carotid bruit or JVD. Cardiovascular:      Rate and Rhythm: Normal rate and regular rhythm. Pulses: Normal pulses. Heart sounds: Normal heart sounds. No murmur.    Pulmonary: 07/30/2020    CREATININE 1.0 10/15/2019    CREATININE 0.9 04/09/2019     Hepatic Function:   Lab Results   Component Value Date    AST 15 07/30/2020    AST 21 10/15/2019    AST 20 04/09/2019    ALT 13 07/30/2020    ALT 8 10/15/2019    ALT 13 04/09/2019    BILIDIR 0.15 10/26/2010    BILIDIR 0.09 05/04/2010    BILITOT 0.3 07/30/2020    BILITOT 0.5 10/15/2019    BILITOT <0.2 04/09/2019    ALKPHOS 84 07/30/2020    ALKPHOS 74 10/15/2019    ALKPHOS 65 04/09/2019     No results found for: LIPASE, AMYLASE  Lipids:   Lab Results   Component Value Date    CHOL 127 07/30/2020    HDL 40 07/30/2020    HDL 74 03/10/2012    TRIG 107 07/30/2020       ASSESSMENT/PLAN  1.) Mild anemia in 79 yo female, H/H 9.6/29.5 with positive FOB  GI referal [facility]  Start Feosol BID pc repeat CBC and iron studies in 1 month    F/u in 1 month to review labs and GI recommendations    Electronically signed by Licha Martinez MD on 8/17/2020 at 2:06 PM

## 2020-08-18 ENCOUNTER — TELEPHONE (OUTPATIENT)
Dept: PRIMARY CARE CLINIC | Age: 85
End: 2020-08-18

## 2020-08-18 NOTE — TELEPHONE ENCOUNTER
Pt is informed. You can access the FollowMyHealth Patient Portal offered by [facility] by registering at the following website: [email or web address]. By joining [employer]’s FollowMyHealth portal, you will also be able to view your health information using other applications (apps) compatible with our system.

## 2020-08-18 NOTE — TELEPHONE ENCOUNTER
PTs Care giver Maynor Odell on 08/17 for an appt and forgot to request iron supplement to be prescribed to PT to please be called into [facility] [phone]

## 2020-08-26 RX ORDER — BLOOD SUGAR DIAGNOSTIC
STRIP MISCELLANEOUS
Qty: 75 STRIP | Refills: 10 | Status: SHIPPED | OUTPATIENT
Start: 2020-08-26 | End: 2021-04-13 | Stop reason: ALTCHOICE

## 2020-08-26 RX ORDER — LANCETS 33 GAUGE
EACH MISCELLANEOUS
Qty: 100 EACH | Refills: 10 | Status: SHIPPED | OUTPATIENT
Start: 2020-08-26 | End: 2021-05-03 | Stop reason: ALTCHOICE

## 2020-09-16 DIAGNOSIS — D64.9 ANEMIA, UNSPECIFIED TYPE: ICD-10-CM

## 2020-09-17 LAB
HCT VFR BLD CALC: 33.8 % (ref 36–48)
HEMOGLOBIN: 10.4 G/DL (ref 12–16)
IRON SATURATION: 29 % (ref 15–50)
IRON: 64 UG/DL (ref 37–145)
MCH RBC QN AUTO: 30.5 PG (ref 26–34)
MCHC RBC AUTO-ENTMCNC: 30.8 G/DL (ref 31–36)
MCV RBC AUTO: 98.9 FL (ref 80–100)
PDW BLD-RTO: 19.6 % (ref 12.4–15.4)
PLATELET # BLD: 263 K/UL (ref 135–450)
PMV BLD AUTO: 8.7 FL (ref 5–10.5)
RBC # BLD: 3.42 M/UL (ref 4–5.2)
TOTAL IRON BINDING CAPACITY: 223 UG/DL (ref 260–445)
WBC # BLD: 8 K/UL (ref 4–11)

## 2020-09-18 ENCOUNTER — OFFICE VISIT (OUTPATIENT)
Dept: PRIMARY CARE CLINIC | Age: 85
End: 2020-09-18
Payer: MEDICARE

## 2020-09-18 VITALS
HEART RATE: 71 BPM | WEIGHT: 128.2 LBS | DIASTOLIC BLOOD PRESSURE: 66 MMHG | BODY MASS INDEX: 20.69 KG/M2 | TEMPERATURE: 97 F | OXYGEN SATURATION: 90 % | SYSTOLIC BLOOD PRESSURE: 96 MMHG

## 2020-09-18 PROCEDURE — 90694 VACC AIIV4 NO PRSRV 0.5ML IM: CPT | Performed by: INTERNAL MEDICINE

## 2020-09-18 PROCEDURE — G0008 ADMIN INFLUENZA VIRUS VAC: HCPCS | Performed by: INTERNAL MEDICINE

## 2020-09-18 PROCEDURE — 99213 OFFICE O/P EST LOW 20 MIN: CPT | Performed by: INTERNAL MEDICINE

## 2020-09-18 ASSESSMENT — ENCOUNTER SYMPTOMS
ABDOMINAL PAIN: 0
BACK PAIN: 0
DIARRHEA: 0
COUGH: 0
NAUSEA: 0
CHEST TIGHTNESS: 0
SHORTNESS OF BREATH: 0
ABDOMINAL DISTENTION: 0

## 2020-09-18 NOTE — PROGRESS NOTES
9/18/2020   César Fields  1/16/1931    The patients PMH, surgical history, family history, medications, allergies were all reviewed and updated as appropriate today. Current Outpatient Medications on File Prior to Visit   Medication Sig Dispense Refill    blood glucose test strips (ONETOUCH ULTRA) strip TEST ONCE DAILY AS DIRECTED 75 strip 10    Lancets (ONETOUCH DELICA PLUS [id number]) MISC TEST ONCE DAILY AS DIRECTED 100 each 10    gabapentin (NEURONTIN) 100 MG capsule TAKE ONE CAPSULE BY MOUTH THREE TIMES A  capsule 0    nabumetone (RELAFEN) 500 MG tablet TAKE ONE TABLET BY MOUTH TWICE A DAY 60 tablet 2    metFORMIN (GLUCOPHAGE) 500 MG tablet TAKE 1 TABLET BY MOUTH TWICE DAILY WITH MEALS 180 tablet 3    lisinopril (PRINIVIL;ZESTRIL) 10 MG tablet TAKE 1 TABLET BY MOUTH TWICE DAILY 180 tablet 3    hydroCHLOROthiazide (MICROZIDE) 12.5 MG capsule TAKE ONE CAPSULE BY MOUTH EVERY MORNING 90 capsule 3    donepezil (ARICEPT) 10 MG tablet TAKE 1 TABLET BY MOUTH EVERY NIGHT AT BEDTIME 90 tablet 3    clopidogrel (PLAVIX) 75 MG tablet TAKE ONE TABLET BY MOUTH DAILY 90 tablet 3    pravastatin (PRAVACHOL) 40 MG tablet TAKE ONE TABLET BY MOUTH DAILY 87 tablet 2    metoprolol succinate (TOPROL XL) 25 MG extended release tablet TAKE 1 TABLET BY MOUTH DAILY 90 tablet 3    montelukast (SINGULAIR) 10 MG tablet Take 10 mg by mouth nightly      omeprazole (PRILOSEC) 40 MG delayed release capsule Take 40 mg by mouth daily      Glucosamine-Chondroitin (GLUCOSAMINE CHONDR COMPLEX PO) Take  by mouth daily.  Multiple Vitamins-Minerals (CENTRUM SILVER) TABS Take 1 tablet by mouth daily.  aspirin 81 MG EC tablet Take 81 mg by mouth daily. No current facility-administered medications on file prior to visit.          Chief Complaint   Patient presents with    Anemia     f/u, had labs done       HPI:  Here with daughter, pt in wheelchair  Needs to review recent labs    Review of Systems Constitutional: Negative for appetite change, chills, fatigue and fever. Respiratory: Negative for cough, chest tightness and shortness of breath. Cardiovascular: Negative for chest pain. Gastrointestinal: Negative for abdominal distention, abdominal pain, diarrhea and nausea. Genitourinary: Negative for difficulty urinating and dysuria. Musculoskeletal: Negative for back pain. Neurological: Negative for dizziness and headaches. Psychiatric/Behavioral: Negative for agitation and behavioral problems. The patient is not nervous/anxious. OBJECTIVE:  BP 96/66 (Site: Left Upper Arm, Position: Sitting, Cuff Size: Medium Adult)   Pulse 71   Temp 97 °F (36.1 °C) (Temporal)   Wt 128 lb 3.2 oz (58.2 kg)   SpO2 90%   BMI 20.69 kg/m²    Physical Exam  Vitals signs and nursing note reviewed. Constitutional:       General: She is not in acute distress. Appearance: Normal appearance. She is well-developed. HENT:      Head: Normocephalic and atraumatic. Right Ear: Tympanic membrane, ear canal and external ear normal.      Left Ear: Tympanic membrane, ear canal and external ear normal.      Nose: Nose normal. No rhinorrhea. Mouth/Throat:      Pharynx: No oropharyngeal exudate or posterior oropharyngeal erythema. Eyes:      General:         Right eye: No discharge. Left eye: No discharge. Extraocular Movements: Extraocular movements intact. Conjunctiva/sclera: Conjunctivae normal.      Pupils: Pupils are equal, round, and reactive to light. Neck:      Musculoskeletal: Normal range of motion. No muscular tenderness. Thyroid: No thyromegaly. Vascular: No carotid bruit or JVD. Cardiovascular:      Rate and Rhythm: Normal rate and regular rhythm. Pulses: Normal pulses. Heart sounds: Normal heart sounds. No murmur. Pulmonary:      Effort: Pulmonary effort is normal. No respiratory distress. Breath sounds: Normal breath sounds.  No wheezing, rhonchi or rales. Abdominal:      General: Bowel sounds are normal. There is no distension. Palpations: Abdomen is soft. Tenderness: There is no abdominal tenderness. There is no rebound. Musculoskeletal:         General: No swelling. Right lower leg: No edema. Left lower leg: No edema. Comments: FROM x 4   Lymphadenopathy:      Cervical: No cervical adenopathy. Skin:     General: Skin is warm and dry. Capillary Refill: Capillary refill takes 2 to 3 seconds. Coloration: Skin is not pale. Findings: No erythema or rash. Neurological:      Mental Status: She is alert and oriented to person, place, and time. Cranial Nerves: No cranial nerve deficit. Sensory: No sensory deficit. Motor: No abnormal muscle tone. Deep Tendon Reflexes: Reflexes normal.   Psychiatric:         Mood and Affect: Mood normal.         Behavior: Behavior normal.         Thought Content:  Thought content normal.         Judgment: Judgment normal.         Data Review:   CBC:   Lab Results   Component Value Date    WBC 8.0 09/16/2020    WBC 7.6 07/30/2020    WBC 13.2 10/15/2019    HGB 10.4 09/16/2020    HGB 9.6 07/30/2020    HGB 10.0 10/15/2019    HCT 33.8 09/16/2020    HCT 29.5 07/30/2020    HCT 31.6 10/15/2019    MCV 98.9 09/16/2020    MCV 94.2 07/30/2020    MCV 97.2 10/15/2019     09/16/2020     07/30/2020     10/15/2019     Chemistry:   Lab Results   Component Value Date     07/30/2020     10/15/2019     04/09/2019    K 4.3 07/30/2020    K 4.9 10/15/2019    K 4.5 04/09/2019     07/30/2020     10/15/2019     04/09/2019    CO2 25 07/30/2020    CO2 25 10/15/2019    CO2 27 04/09/2019    PHOS 3.7 01/17/2011    PHOS 3.7 10/26/2010    BUN 19 07/30/2020    BUN 20 10/15/2019    BUN 20 04/09/2019    CREATININE 1.1 07/30/2020    CREATININE 1.0 10/15/2019    CREATININE 0.9 04/09/2019     Hepatic Function:   Lab Results   Component Value

## 2020-10-20 ENCOUNTER — TELEPHONE (OUTPATIENT)
Dept: PRIMARY CARE CLINIC | Age: 85
End: 2020-10-20

## 2020-10-20 NOTE — TELEPHONE ENCOUNTER
Patient needs a Script stating she needs medical assistance to be approved [address] through medicare.

## 2020-10-21 RX ORDER — PRAVASTATIN SODIUM 40 MG
TABLET ORAL
Qty: 87 TABLET | Refills: 1 | Status: SHIPPED | OUTPATIENT
Start: 2020-10-21 | End: 2020-10-23 | Stop reason: SDUPTHER

## 2020-10-23 RX ORDER — PRAVASTATIN SODIUM 40 MG
TABLET ORAL
Qty: 87 TABLET | Refills: 1 | Status: SHIPPED | OUTPATIENT
Start: 2020-10-23 | End: 2021-09-03

## 2020-10-26 RX ORDER — GABAPENTIN 100 MG/1
CAPSULE ORAL
Qty: 270 CAPSULE | Refills: 0 | Status: SHIPPED | OUTPATIENT
Start: 2020-10-26 | End: 2021-02-08

## 2020-10-26 RX ORDER — METOPROLOL SUCCINATE 25 MG/1
TABLET, EXTENDED RELEASE ORAL
Qty: 90 TABLET | Refills: 2 | Status: SHIPPED | OUTPATIENT
Start: 2020-10-26 | End: 2021-05-10

## 2020-12-07 ENCOUNTER — TELEPHONE (OUTPATIENT)
Dept: PRIMARY CARE CLINIC | Age: 85
End: 2020-12-07

## 2020-12-10 ENCOUNTER — NURSE ONLY (OUTPATIENT)
Dept: PRIMARY CARE CLINIC | Age: 85
End: 2020-12-10

## 2020-12-10 NOTE — PROGRESS NOTES
Patient came in today with her daughter to be taught how to use the glucose machine so that daughter will be able to check Tere's glucose at home. Patient's glucose reading today here in the office was 123. Daughter also gave a sheet of paper with some concerns that she has, Kassie Suarez has an appt on 12/18 with Dr. Raul Salazar and she would like for Dr. Raul Salazar to go over the sheet so that he is aware of the concerns for the up coming appt. Letter scanned into media.

## 2021-01-04 DIAGNOSIS — E11.21 CONTROLLED TYPE 2 DIABETES MELLITUS WITH DIABETIC NEPHROPATHY, WITHOUT LONG-TERM CURRENT USE OF INSULIN (HCC): ICD-10-CM

## 2021-01-04 DIAGNOSIS — D50.9 IRON DEFICIENCY ANEMIA, UNSPECIFIED IRON DEFICIENCY ANEMIA TYPE: ICD-10-CM

## 2021-01-04 LAB
A/G RATIO: 0.9 (ref 1.1–2.2)
ALBUMIN SERPL-MCNC: 3 G/DL (ref 3.4–5)
ALP BLD-CCNC: 74 U/L (ref 40–129)
ALT SERPL-CCNC: 10 U/L (ref 10–40)
ANION GAP SERPL CALCULATED.3IONS-SCNC: 8 MMOL/L (ref 3–16)
AST SERPL-CCNC: 16 U/L (ref 15–37)
BILIRUB SERPL-MCNC: <0.2 MG/DL (ref 0–1)
BUN BLDV-MCNC: 22 MG/DL (ref 7–20)
CALCIUM SERPL-MCNC: 9.7 MG/DL (ref 8.3–10.6)
CHLORIDE BLD-SCNC: 104 MMOL/L (ref 99–110)
CO2: 27 MMOL/L (ref 21–32)
CREAT SERPL-MCNC: 0.9 MG/DL (ref 0.6–1.2)
GFR AFRICAN AMERICAN: >60
GFR NON-AFRICAN AMERICAN: 59
GLOBULIN: 3.4 G/DL
GLUCOSE FASTING: 77 MG/DL (ref 70–99)
IRON SATURATION: 23 % (ref 15–50)
IRON: 48 UG/DL (ref 37–145)
POTASSIUM SERPL-SCNC: 4.3 MMOL/L (ref 3.5–5.1)
SODIUM BLD-SCNC: 139 MMOL/L (ref 136–145)
TOTAL IRON BINDING CAPACITY: 205 UG/DL (ref 260–445)
TOTAL PROTEIN: 6.4 G/DL (ref 6.4–8.2)

## 2021-01-05 LAB
ESTIMATED AVERAGE GLUCOSE: 122.6 MG/DL
HBA1C MFR BLD: 5.9 %

## 2021-01-06 ENCOUNTER — OFFICE VISIT (OUTPATIENT)
Dept: PRIMARY CARE CLINIC | Age: 86
End: 2021-01-06
Payer: MEDICARE

## 2021-01-06 VITALS — DIASTOLIC BLOOD PRESSURE: 66 MMHG | SYSTOLIC BLOOD PRESSURE: 100 MMHG | TEMPERATURE: 96.6 F | HEART RATE: 65 BPM

## 2021-01-06 DIAGNOSIS — D50.0 IRON DEFICIENCY ANEMIA DUE TO CHRONIC BLOOD LOSS: ICD-10-CM

## 2021-01-06 DIAGNOSIS — Z91.81 AT HIGH RISK FOR FALLS: Primary | ICD-10-CM

## 2021-01-06 DIAGNOSIS — C44.90 SKIN CANCER: ICD-10-CM

## 2021-01-06 PROCEDURE — 99213 OFFICE O/P EST LOW 20 MIN: CPT | Performed by: INTERNAL MEDICINE

## 2021-01-06 ASSESSMENT — ENCOUNTER SYMPTOMS
DIARRHEA: 0
CHEST TIGHTNESS: 0
BACK PAIN: 0
SHORTNESS OF BREATH: 0
ABDOMINAL DISTENTION: 0
ABDOMINAL PAIN: 0
NAUSEA: 0
COUGH: 0

## 2021-01-06 ASSESSMENT — PATIENT HEALTH QUESTIONNAIRE - PHQ9
SUM OF ALL RESPONSES TO PHQ QUESTIONS 1-9: 0
SUM OF ALL RESPONSES TO PHQ9 QUESTIONS 1 & 2: 0

## 2021-01-06 NOTE — PROGRESS NOTES
1/6/2021   Glenn Haq  1/16/1931    The patients PMH, surgical history, family history, medications, allergies were all reviewed and updated as appropriate today. Current Outpatient Medications on File Prior to Visit   Medication Sig Dispense Refill    metoprolol succinate (TOPROL XL) 25 MG extended release tablet TAKE ONE TABLET BY MOUTH DAILY 90 tablet 2    gabapentin (NEURONTIN) 100 MG capsule TAKE ONE CAPSULE BY MOUTH THREE TIMES A  capsule 0    pravastatin (PRAVACHOL) 40 MG tablet TAKE ONE TABLET BY MOUTH DAILY 87 tablet 1    blood glucose test strips (ONETOUCH ULTRA) strip TEST ONCE DAILY AS DIRECTED 75 strip 10    Lancets (ONETOUCH DELICA PLUS [id number]) MISC TEST ONCE DAILY AS DIRECTED 100 each 10    nabumetone (RELAFEN) 500 MG tablet TAKE ONE TABLET BY MOUTH TWICE A DAY 60 tablet 2    metFORMIN (GLUCOPHAGE) 500 MG tablet TAKE 1 TABLET BY MOUTH TWICE DAILY WITH MEALS 180 tablet 3    lisinopril (PRINIVIL;ZESTRIL) 10 MG tablet TAKE 1 TABLET BY MOUTH TWICE DAILY 180 tablet 3    hydroCHLOROthiazide (MICROZIDE) 12.5 MG capsule TAKE ONE CAPSULE BY MOUTH EVERY MORNING 90 capsule 3    donepezil (ARICEPT) 10 MG tablet TAKE 1 TABLET BY MOUTH EVERY NIGHT AT BEDTIME 90 tablet 3    clopidogrel (PLAVIX) 75 MG tablet TAKE ONE TABLET BY MOUTH DAILY 90 tablet 3    montelukast (SINGULAIR) 10 MG tablet Take 10 mg by mouth nightly      omeprazole (PRILOSEC) 40 MG delayed release capsule Take 40 mg by mouth daily      Glucosamine-Chondroitin (GLUCOSAMINE CHONDR COMPLEX PO) Take  by mouth daily.  Multiple Vitamins-Minerals (CENTRUM SILVER) TABS Take 1 tablet by mouth daily.  aspirin 81 MG EC tablet Take 81 mg by mouth daily. No current facility-administered medications on file prior to visit. Chief Complaint   Patient presents with    Anemia     also wants a spot looked at on her back near neck and right groin-open wound.         HPI:  Here for anemia f/u visit  refuses physical therapy referral for fall risk    Has open sore R groin and suspicious lesion upper back susicious for skin CA    Review of Systems   Constitutional: Negative for appetite change, chills, fatigue and fever. Respiratory: Negative for cough, chest tightness and shortness of breath. Cardiovascular: Negative for chest pain. Gastrointestinal: Negative for abdominal distention, abdominal pain, diarrhea and nausea. Genitourinary: Negative for difficulty urinating and dysuria. Musculoskeletal: Negative for back pain. Neurological: Negative for dizziness and headaches. Psychiatric/Behavioral: Negative for agitation and behavioral problems. The patient is not nervous/anxious. OBJECTIVE:  /66 (Site: Right Upper Arm, Position: Sitting, Cuff Size: Medium Adult)   Pulse 65   Temp 96.6 °F (35.9 °C) (Infrared)    Physical Exam  Vitals signs and nursing note reviewed. Constitutional:       General: She is not in acute distress. Appearance: Normal appearance. She is well-developed. HENT:      Head: Normocephalic and atraumatic. Right Ear: Tympanic membrane, ear canal and external ear normal.      Left Ear: Tympanic membrane, ear canal and external ear normal.      Nose: Nose normal. No rhinorrhea. Mouth/Throat:      Pharynx: No oropharyngeal exudate or posterior oropharyngeal erythema. Eyes:      General:         Right eye: No discharge. Left eye: No discharge. Extraocular Movements: Extraocular movements intact. Conjunctiva/sclera: Conjunctivae normal.      Pupils: Pupils are equal, round, and reactive to light. Neck:      Musculoskeletal: Normal range of motion. No muscular tenderness. Thyroid: No thyromegaly. Vascular: No carotid bruit or JVD. Cardiovascular:      Rate and Rhythm: Normal rate and regular rhythm. Pulses: Normal pulses. Heart sounds: Normal heart sounds. No murmur.    Pulmonary:      Effort: Pulmonary effort is normal. No respiratory distress. Breath sounds: Normal breath sounds. No wheezing, rhonchi or rales. Abdominal:      General: Bowel sounds are normal. There is no distension. Palpations: Abdomen is soft. Tenderness: There is no abdominal tenderness. There is no rebound. Musculoskeletal:         General: No swelling. Right lower leg: No edema. Left lower leg: No edema. Comments: FROM x 4   Lymphadenopathy:      Cervical: No cervical adenopathy. Skin:     General: Skin is warm and dry. Capillary Refill: Capillary refill takes 2 to 3 seconds. Coloration: Skin is not pale. Findings: No erythema or rash. Neurological:      Mental Status: She is alert and oriented to person, place, and time. Cranial Nerves: No cranial nerve deficit. Sensory: No sensory deficit. Motor: No abnormal muscle tone. Deep Tendon Reflexes: Reflexes normal.   Psychiatric:         Mood and Affect: Mood normal.         Behavior: Behavior normal.         Thought Content:  Thought content normal.         Judgment: Judgment normal.         Data Review:   CBC:   Lab Results   Component Value Date    WBC 8.0 09/16/2020    WBC 7.6 07/30/2020    WBC 13.2 10/15/2019    HGB 10.4 09/16/2020    HGB 9.6 07/30/2020    HGB 10.0 10/15/2019    HCT 33.8 09/16/2020    HCT 29.5 07/30/2020    HCT 31.6 10/15/2019    MCV 98.9 09/16/2020    MCV 94.2 07/30/2020    MCV 97.2 10/15/2019     09/16/2020     07/30/2020     10/15/2019     Chemistry:   Lab Results   Component Value Date     01/04/2021     07/30/2020     10/15/2019    K 4.3 01/04/2021    K 4.3 07/30/2020    K 4.9 10/15/2019     01/04/2021     07/30/2020     10/15/2019    CO2 27 01/04/2021    CO2 25 07/30/2020    CO2 25 10/15/2019    PHOS 3.7 01/17/2011    PHOS 3.7 10/26/2010    BUN 22 01/04/2021    BUN 19 07/30/2020    BUN 20 10/15/2019    CREATININE 0.9 01/04/2021    CREATININE 1.1 07/30/2020    CREATININE 1.0 10/15/2019     Hepatic Function:   Lab Results   Component Value Date    AST 16 01/04/2021    AST 15 07/30/2020    AST 21 10/15/2019    ALT 10 01/04/2021    ALT 13 07/30/2020    ALT 8 10/15/2019    BILIDIR 0.15 10/26/2010    BILIDIR 0.09 05/04/2010    BILITOT <0.2 01/04/2021    BILITOT 0.3 07/30/2020    BILITOT 0.5 10/15/2019    ALKPHOS 74 01/04/2021    ALKPHOS 84 07/30/2020    ALKPHOS 74 10/15/2019     No results found for: LIPASE, AMYLASE  Lipids:   Lab Results   Component Value Date    CHOL 127 07/30/2020    HDL 40 07/30/2020    HDL 74 03/10/2012    TRIG 107 07/30/2020       ASSESSMENT/PLAN  1.) Anemia stable- repeat H/H again today and in 3 months    2.) Fall risk- pt refuses physical therapy referral    3.) Bactroban for R groin    4.) Dermatology referral re: suspicious skin CA upper back    Tayla Hernandez        Electronically signed by Tayla Hernandez MD on 1/6/2021 at 11:42 AM           On the basis of positive falls risk screening, assessment and plan is as follows: patient declines any further evaluation/treatment for increased falls risk.

## 2021-01-14 DIAGNOSIS — D50.0 IRON DEFICIENCY ANEMIA DUE TO CHRONIC BLOOD LOSS: ICD-10-CM

## 2021-01-14 LAB
BASOPHILS ABSOLUTE: 0.1 K/UL (ref 0–0.2)
BASOPHILS RELATIVE PERCENT: 0.8 %
EOSINOPHILS ABSOLUTE: 0.1 K/UL (ref 0–0.6)
EOSINOPHILS RELATIVE PERCENT: 0.8 %
HCT VFR BLD CALC: 30.9 % (ref 36–48)
HEMOGLOBIN: 10.1 G/DL (ref 12–16)
LYMPHOCYTES ABSOLUTE: 1.4 K/UL (ref 1–5.1)
LYMPHOCYTES RELATIVE PERCENT: 20.1 %
MCH RBC QN AUTO: 31.3 PG (ref 26–34)
MCHC RBC AUTO-ENTMCNC: 32.6 G/DL (ref 31–36)
MCV RBC AUTO: 96.2 FL (ref 80–100)
MONOCYTES ABSOLUTE: 0.5 K/UL (ref 0–1.3)
MONOCYTES RELATIVE PERCENT: 7 %
NEUTROPHILS ABSOLUTE: 5.1 K/UL (ref 1.7–7.7)
NEUTROPHILS RELATIVE PERCENT: 71.3 %
PDW BLD-RTO: 18.3 % (ref 12.4–15.4)
PLATELET # BLD: 283 K/UL (ref 135–450)
PMV BLD AUTO: 8.4 FL (ref 5–10.5)
RBC # BLD: 3.21 M/UL (ref 4–5.2)
WBC # BLD: 7.2 K/UL (ref 4–11)

## 2021-02-06 DIAGNOSIS — E11.42 DIABETIC POLYNEUROPATHY ASSOCIATED WITH TYPE 2 DIABETES MELLITUS (HCC): ICD-10-CM

## 2021-02-08 RX ORDER — GABAPENTIN 100 MG/1
CAPSULE ORAL
Qty: 45 CAPSULE | Refills: 0 | Status: SHIPPED | OUTPATIENT
Start: 2021-02-08 | End: 2021-06-01

## 2021-02-08 NOTE — TELEPHONE ENCOUNTER
Requested Prescriptions     Pending Prescriptions Disp Refills    gabapentin (NEURONTIN) 100 MG capsule [Pharmacy Med Name: GABAPENTIN 100 MG CAPSULE] 45 capsule 0     Sig: TAKE ONE CAPSULE BY MOUTH THREE TIMES A DAY      Last OV 1/25/2021

## 2021-04-05 DIAGNOSIS — D50.0 IRON DEFICIENCY ANEMIA DUE TO CHRONIC BLOOD LOSS: ICD-10-CM

## 2021-04-05 DIAGNOSIS — E11.9 CONTROLLED TYPE 2 DIABETES MELLITUS WITHOUT COMPLICATION, WITHOUT LONG-TERM CURRENT USE OF INSULIN (HCC): ICD-10-CM

## 2021-04-05 LAB
BASOPHILS ABSOLUTE: 0.1 K/UL (ref 0–0.2)
BASOPHILS RELATIVE PERCENT: 0.8 %
EOSINOPHILS ABSOLUTE: 0.1 K/UL (ref 0–0.6)
EOSINOPHILS RELATIVE PERCENT: 0.6 %
HCT VFR BLD CALC: 31.2 % (ref 36–48)
HEMOGLOBIN: 9.8 G/DL (ref 12–16)
LYMPHOCYTES ABSOLUTE: 1.4 K/UL (ref 1–5.1)
LYMPHOCYTES RELATIVE PERCENT: 12.8 %
MCH RBC QN AUTO: 30.3 PG (ref 26–34)
MCHC RBC AUTO-ENTMCNC: 31.5 G/DL (ref 31–36)
MCV RBC AUTO: 96.1 FL (ref 80–100)
MONOCYTES ABSOLUTE: 0.7 K/UL (ref 0–1.3)
MONOCYTES RELATIVE PERCENT: 6 %
NEUTROPHILS ABSOLUTE: 8.8 K/UL (ref 1.7–7.7)
NEUTROPHILS RELATIVE PERCENT: 79.8 %
PDW BLD-RTO: 15.6 % (ref 12.4–15.4)
PLATELET # BLD: 287 K/UL (ref 135–450)
PMV BLD AUTO: 8.6 FL (ref 5–10.5)
RBC # BLD: 3.24 M/UL (ref 4–5.2)
WBC # BLD: 11 K/UL (ref 4–11)

## 2021-04-06 LAB
ESTIMATED AVERAGE GLUCOSE: 128.4 MG/DL
HBA1C MFR BLD: 6.1 %

## 2021-04-07 ENCOUNTER — OFFICE VISIT (OUTPATIENT)
Dept: PRIMARY CARE CLINIC | Age: 86
End: 2021-04-07
Payer: MEDICARE

## 2021-04-07 VITALS
DIASTOLIC BLOOD PRESSURE: 60 MMHG | BODY MASS INDEX: 20.69 KG/M2 | TEMPERATURE: 97.4 F | OXYGEN SATURATION: 98 % | HEIGHT: 66 IN | SYSTOLIC BLOOD PRESSURE: 98 MMHG | HEART RATE: 92 BPM

## 2021-04-07 DIAGNOSIS — D32.9 MENINGIOMA (HCC): ICD-10-CM

## 2021-04-07 DIAGNOSIS — F02.80 LATE ONSET ALZHEIMER'S DISEASE WITHOUT BEHAVIORAL DISTURBANCE (HCC): ICD-10-CM

## 2021-04-07 DIAGNOSIS — D64.9 ANEMIA, UNSPECIFIED TYPE: Primary | ICD-10-CM

## 2021-04-07 DIAGNOSIS — G30.1 LATE ONSET ALZHEIMER'S DISEASE WITHOUT BEHAVIORAL DISTURBANCE (HCC): ICD-10-CM

## 2021-04-07 DIAGNOSIS — E11.21 CONTROLLED TYPE 2 DIABETES MELLITUS WITH DIABETIC NEPHROPATHY, WITHOUT LONG-TERM CURRENT USE OF INSULIN (HCC): ICD-10-CM

## 2021-04-07 PROCEDURE — 99213 OFFICE O/P EST LOW 20 MIN: CPT | Performed by: INTERNAL MEDICINE

## 2021-04-07 RX ORDER — TRIAMCINOLONE ACETONIDE 1 MG/G
CREAM TOPICAL 2 TIMES DAILY
COMMUNITY
Start: 2021-03-02 | End: 2021-05-03 | Stop reason: ALTCHOICE

## 2021-04-07 RX ORDER — GENTAMICIN SULFATE 1 MG/G
OINTMENT TOPICAL 2 TIMES DAILY
COMMUNITY
Start: 2021-03-03 | End: 2021-05-03 | Stop reason: ALTCHOICE

## 2021-04-07 ASSESSMENT — ENCOUNTER SYMPTOMS
SHORTNESS OF BREATH: 0
ABDOMINAL DISTENTION: 0
COUGH: 0
DIARRHEA: 0
ABDOMINAL PAIN: 0
CHEST TIGHTNESS: 0
BACK PAIN: 0
NAUSEA: 0

## 2021-04-07 NOTE — PROGRESS NOTES
regular rhythm. Pulses: Normal pulses. Heart sounds: Normal heart sounds. No murmur. Pulmonary:      Effort: Pulmonary effort is normal. No respiratory distress. Breath sounds: Normal breath sounds. No wheezing, rhonchi or rales. Abdominal:      General: Bowel sounds are normal. There is no distension. Palpations: Abdomen is soft. Tenderness: There is no abdominal tenderness. There is no rebound. Musculoskeletal:         General: No swelling. Right lower leg: No edema. Left lower leg: No edema. Comments: FROM x 4   Lymphadenopathy:      Cervical: No cervical adenopathy. Skin:     General: Skin is warm and dry. Capillary Refill: Capillary refill takes 2 to 3 seconds. Coloration: Skin is not pale. Findings: No erythema or rash. Neurological:      Mental Status: She is alert and oriented to person, place, and time. Cranial Nerves: No cranial nerve deficit. Sensory: No sensory deficit. Motor: No abnormal muscle tone. Deep Tendon Reflexes: Reflexes normal.   Psychiatric:         Mood and Affect: Mood normal.         Behavior: Behavior normal.         Thought Content:  Thought content normal.         Judgment: Judgment normal.         Data Review:   CBC:   Lab Results   Component Value Date    WBC 11.0 04/05/2021    WBC 7.2 01/14/2021    WBC 8.0 09/16/2020    HGB 9.8 04/05/2021    HGB 10.1 01/14/2021    HGB 10.4 09/16/2020    HCT 31.2 04/05/2021    HCT 30.9 01/14/2021    HCT 33.8 09/16/2020    MCV 96.1 04/05/2021    MCV 96.2 01/14/2021    MCV 98.9 09/16/2020     04/05/2021     01/14/2021     09/16/2020     Chemistry:   Lab Results   Component Value Date     01/04/2021     07/30/2020     10/15/2019    K 4.3 01/04/2021    K 4.3 07/30/2020    K 4.9 10/15/2019     01/04/2021     07/30/2020     10/15/2019    CO2 27 01/04/2021    CO2 25 07/30/2020    CO2 25 10/15/2019    PHOS 3.7

## 2021-04-12 ENCOUNTER — NURSE ONLY (OUTPATIENT)
Dept: PRIMARY CARE CLINIC | Age: 86
End: 2021-04-12
Payer: MEDICARE

## 2021-04-12 DIAGNOSIS — Z12.11 SCREENING FOR COLON CANCER: Primary | ICD-10-CM

## 2021-04-12 LAB
CONTROL: ABNORMAL
HEMOCCULT STL QL: POSITIVE

## 2021-04-12 PROCEDURE — 82274 ASSAY TEST FOR BLOOD FECAL: CPT | Performed by: INTERNAL MEDICINE

## 2021-04-13 ENCOUNTER — APPOINTMENT (OUTPATIENT)
Dept: GENERAL RADIOLOGY | Age: 86
DRG: 392 | End: 2021-04-13
Payer: MEDICARE

## 2021-04-13 ENCOUNTER — APPOINTMENT (OUTPATIENT)
Dept: CT IMAGING | Age: 86
DRG: 392 | End: 2021-04-13
Payer: MEDICARE

## 2021-04-13 ENCOUNTER — HOSPITAL ENCOUNTER (INPATIENT)
Age: 86
LOS: 3 days | Discharge: NURSING, REHAB OR LONG-TERM CARE | DRG: 392 | End: 2021-04-16
Attending: INTERNAL MEDICINE | Admitting: INTERNAL MEDICINE
Payer: MEDICARE

## 2021-04-13 DIAGNOSIS — R77.8 ELEVATED TROPONIN: ICD-10-CM

## 2021-04-13 DIAGNOSIS — R29.6 FREQUENT FALLS: Primary | ICD-10-CM

## 2021-04-13 PROBLEM — R62.7 FAILURE TO THRIVE IN ADULT: Status: ACTIVE | Noted: 2021-04-13

## 2021-04-13 LAB
A/G RATIO: 0.6 (ref 1.1–2.2)
ALBUMIN SERPL-MCNC: 2.9 G/DL (ref 3.4–5)
ALP BLD-CCNC: 94 U/L (ref 40–129)
ALT SERPL-CCNC: 18 U/L (ref 10–40)
ANION GAP SERPL CALCULATED.3IONS-SCNC: 10 MMOL/L (ref 3–16)
APTT: 30.5 SEC (ref 24.2–36.2)
AST SERPL-CCNC: 32 U/L (ref 15–37)
BACTERIA: ABNORMAL /HPF
BASOPHILS ABSOLUTE: 0.1 K/UL (ref 0–0.2)
BASOPHILS RELATIVE PERCENT: 0.6 %
BILIRUB SERPL-MCNC: 0.3 MG/DL (ref 0–1)
BILIRUBIN URINE: ABNORMAL
BLOOD, URINE: ABNORMAL
BUN BLDV-MCNC: 22 MG/DL (ref 7–20)
CALCIUM SERPL-MCNC: 9.9 MG/DL (ref 8.3–10.6)
CHLORIDE BLD-SCNC: 106 MMOL/L (ref 99–110)
CLARITY: ABNORMAL
CO2: 25 MMOL/L (ref 21–32)
COLOR: YELLOW
CREAT SERPL-MCNC: 0.8 MG/DL (ref 0.6–1.2)
EKG ATRIAL RATE: 101 BPM
EKG DIAGNOSIS: NORMAL
EKG P AXIS: 37 DEGREES
EKG P-R INTERVAL: 94 MS
EKG Q-T INTERVAL: 350 MS
EKG QRS DURATION: 74 MS
EKG QTC CALCULATION (BAZETT): 453 MS
EKG R AXIS: 35 DEGREES
EKG T AXIS: 22 DEGREES
EKG VENTRICULAR RATE: 101 BPM
EOSINOPHILS ABSOLUTE: 0.1 K/UL (ref 0–0.6)
EOSINOPHILS RELATIVE PERCENT: 0.9 %
EPITHELIAL CELLS, UA: 2 /HPF (ref 0–5)
GFR AFRICAN AMERICAN: >60
GFR NON-AFRICAN AMERICAN: >60
GLOBULIN: 4.5 G/DL
GLUCOSE BLD-MCNC: 52 MG/DL (ref 70–99)
GLUCOSE BLD-MCNC: 91 MG/DL (ref 70–99)
GLUCOSE URINE: NEGATIVE MG/DL
HCT VFR BLD CALC: 30.2 % (ref 36–48)
HEMOGLOBIN: 9.5 G/DL (ref 12–16)
HYALINE CASTS: 4 /LPF (ref 0–8)
INR BLD: 1.13 (ref 0.86–1.14)
KETONES, URINE: 15 MG/DL
LACTIC ACID, SEPSIS: 1.3 MMOL/L (ref 0.4–1.9)
LACTIC ACID, SEPSIS: 1.3 MMOL/L (ref 0.4–1.9)
LEUKOCYTE ESTERASE, URINE: ABNORMAL
LYMPHOCYTES ABSOLUTE: 1.1 K/UL (ref 1–5.1)
LYMPHOCYTES RELATIVE PERCENT: 10.5 %
MCH RBC QN AUTO: 29.6 PG (ref 26–34)
MCHC RBC AUTO-ENTMCNC: 31.3 G/DL (ref 31–36)
MCV RBC AUTO: 94.6 FL (ref 80–100)
MICROSCOPIC EXAMINATION: YES
MONOCYTES ABSOLUTE: 0.8 K/UL (ref 0–1.3)
MONOCYTES RELATIVE PERCENT: 7.7 %
NEUTROPHILS ABSOLUTE: 8.1 K/UL (ref 1.7–7.7)
NEUTROPHILS RELATIVE PERCENT: 80.3 %
NITRITE, URINE: NEGATIVE
PDW BLD-RTO: 15.5 % (ref 12.4–15.4)
PERFORMED ON: ABNORMAL
PH UA: 5 (ref 5–8)
PLATELET # BLD: 298 K/UL (ref 135–450)
PMV BLD AUTO: 8.5 FL (ref 5–10.5)
POTASSIUM SERPL-SCNC: 4.4 MMOL/L (ref 3.5–5.1)
PRO-BNP: 3221 PG/ML (ref 0–449)
PROTEIN UA: NEGATIVE MG/DL
PROTHROMBIN TIME: 13.1 SEC (ref 10–13.2)
RBC # BLD: 3.19 M/UL (ref 4–5.2)
RBC UA: 3 /HPF (ref 0–4)
SODIUM BLD-SCNC: 141 MMOL/L (ref 136–145)
SPECIFIC GRAVITY UA: 1.02 (ref 1–1.03)
TOTAL PROTEIN: 7.4 G/DL (ref 6.4–8.2)
TROPONIN: 0.02 NG/ML
URINE REFLEX TO CULTURE: YES
URINE TYPE: ABNORMAL
UROBILINOGEN, URINE: 1 E.U./DL
WBC # BLD: 10.1 K/UL (ref 4–11)
WBC UA: 20 /HPF (ref 0–5)

## 2021-04-13 PROCEDURE — 1200000000 HC SEMI PRIVATE

## 2021-04-13 PROCEDURE — 73521 X-RAY EXAM HIPS BI 2 VIEWS: CPT

## 2021-04-13 PROCEDURE — G0378 HOSPITAL OBSERVATION PER HR: HCPCS

## 2021-04-13 PROCEDURE — 81001 URINALYSIS AUTO W/SCOPE: CPT

## 2021-04-13 PROCEDURE — 85730 THROMBOPLASTIN TIME PARTIAL: CPT

## 2021-04-13 PROCEDURE — 93005 ELECTROCARDIOGRAM TRACING: CPT | Performed by: PHYSICIAN ASSISTANT

## 2021-04-13 PROCEDURE — 93010 ELECTROCARDIOGRAM REPORT: CPT | Performed by: INTERNAL MEDICINE

## 2021-04-13 PROCEDURE — 71045 X-RAY EXAM CHEST 1 VIEW: CPT

## 2021-04-13 PROCEDURE — 85025 COMPLETE CBC W/AUTO DIFF WBC: CPT

## 2021-04-13 PROCEDURE — 72125 CT NECK SPINE W/O DYE: CPT

## 2021-04-13 PROCEDURE — 70450 CT HEAD/BRAIN W/O DYE: CPT

## 2021-04-13 PROCEDURE — 72192 CT PELVIS W/O DYE: CPT

## 2021-04-13 PROCEDURE — 83036 HEMOGLOBIN GLYCOSYLATED A1C: CPT

## 2021-04-13 PROCEDURE — 85610 PROTHROMBIN TIME: CPT

## 2021-04-13 PROCEDURE — 84484 ASSAY OF TROPONIN QUANT: CPT

## 2021-04-13 PROCEDURE — 6370000000 HC RX 637 (ALT 250 FOR IP): Performed by: PHYSICIAN ASSISTANT

## 2021-04-13 PROCEDURE — 99283 EMERGENCY DEPT VISIT LOW MDM: CPT

## 2021-04-13 PROCEDURE — 83605 ASSAY OF LACTIC ACID: CPT

## 2021-04-13 PROCEDURE — 83880 ASSAY OF NATRIURETIC PEPTIDE: CPT

## 2021-04-13 PROCEDURE — 87086 URINE CULTURE/COLONY COUNT: CPT

## 2021-04-13 PROCEDURE — 87077 CULTURE AEROBIC IDENTIFY: CPT

## 2021-04-13 PROCEDURE — 80053 COMPREHEN METABOLIC PANEL: CPT

## 2021-04-13 PROCEDURE — 87040 BLOOD CULTURE FOR BACTERIA: CPT

## 2021-04-13 PROCEDURE — 87186 SC STD MICRODIL/AGAR DIL: CPT

## 2021-04-13 RX ORDER — HYDRALAZINE HYDROCHLORIDE 20 MG/ML
10 INJECTION INTRAMUSCULAR; INTRAVENOUS EVERY 6 HOURS PRN
Status: DISCONTINUED | OUTPATIENT
Start: 2021-04-13 | End: 2021-04-17 | Stop reason: HOSPADM

## 2021-04-13 RX ORDER — PROMETHAZINE HYDROCHLORIDE 25 MG/1
12.5 TABLET ORAL EVERY 6 HOURS PRN
Status: DISCONTINUED | OUTPATIENT
Start: 2021-04-13 | End: 2021-04-17 | Stop reason: HOSPADM

## 2021-04-13 RX ORDER — POTASSIUM CHLORIDE 7.45 MG/ML
10 INJECTION INTRAVENOUS PRN
Status: DISCONTINUED | OUTPATIENT
Start: 2021-04-13 | End: 2021-04-17 | Stop reason: HOSPADM

## 2021-04-13 RX ORDER — SODIUM CHLORIDE 0.9 % (FLUSH) 0.9 %
5-40 SYRINGE (ML) INJECTION PRN
Status: DISCONTINUED | OUTPATIENT
Start: 2021-04-13 | End: 2021-04-17 | Stop reason: HOSPADM

## 2021-04-13 RX ORDER — DEXTROSE MONOHYDRATE 25 G/50ML
12.5 INJECTION, SOLUTION INTRAVENOUS PRN
Status: DISCONTINUED | OUTPATIENT
Start: 2021-04-13 | End: 2021-04-17 | Stop reason: HOSPADM

## 2021-04-13 RX ORDER — SODIUM CHLORIDE 0.9 % (FLUSH) 0.9 %
5-40 SYRINGE (ML) INJECTION EVERY 12 HOURS SCHEDULED
Status: DISCONTINUED | OUTPATIENT
Start: 2021-04-13 | End: 2021-04-17 | Stop reason: HOSPADM

## 2021-04-13 RX ORDER — FAMOTIDINE 20 MG/1
20 TABLET, FILM COATED ORAL DAILY PRN
Status: DISCONTINUED | OUTPATIENT
Start: 2021-04-13 | End: 2021-04-17 | Stop reason: HOSPADM

## 2021-04-13 RX ORDER — NICOTINE POLACRILEX 4 MG
15 LOZENGE BUCCAL PRN
Status: DISCONTINUED | OUTPATIENT
Start: 2021-04-13 | End: 2021-04-17 | Stop reason: HOSPADM

## 2021-04-13 RX ORDER — ACETAMINOPHEN 650 MG/1
650 SUPPOSITORY RECTAL EVERY 6 HOURS PRN
Status: DISCONTINUED | OUTPATIENT
Start: 2021-04-13 | End: 2021-04-17 | Stop reason: HOSPADM

## 2021-04-13 RX ORDER — ONDANSETRON 2 MG/ML
4 INJECTION INTRAMUSCULAR; INTRAVENOUS EVERY 6 HOURS PRN
Status: DISCONTINUED | OUTPATIENT
Start: 2021-04-13 | End: 2021-04-17 | Stop reason: HOSPADM

## 2021-04-13 RX ORDER — INSULIN LISPRO 100 [IU]/ML
0-6 INJECTION, SOLUTION INTRAVENOUS; SUBCUTANEOUS NIGHTLY
Status: DISCONTINUED | OUTPATIENT
Start: 2021-04-13 | End: 2021-04-17 | Stop reason: HOSPADM

## 2021-04-13 RX ORDER — POTASSIUM CHLORIDE 7.45 MG/ML
10 INJECTION INTRAVENOUS PRN
Status: DISCONTINUED | OUTPATIENT
Start: 2021-04-13 | End: 2021-04-13 | Stop reason: SDUPTHER

## 2021-04-13 RX ORDER — DEXTROSE MONOHYDRATE 50 MG/ML
100 INJECTION, SOLUTION INTRAVENOUS PRN
Status: DISCONTINUED | OUTPATIENT
Start: 2021-04-13 | End: 2021-04-17 | Stop reason: HOSPADM

## 2021-04-13 RX ORDER — 0.9 % SODIUM CHLORIDE 0.9 %
500 INTRAVENOUS SOLUTION INTRAVENOUS PRN
Status: DISCONTINUED | OUTPATIENT
Start: 2021-04-13 | End: 2021-04-17 | Stop reason: HOSPADM

## 2021-04-13 RX ORDER — ACETAMINOPHEN 325 MG/1
650 TABLET ORAL EVERY 6 HOURS PRN
Status: DISCONTINUED | OUTPATIENT
Start: 2021-04-13 | End: 2021-04-17 | Stop reason: HOSPADM

## 2021-04-13 RX ORDER — SODIUM CHLORIDE 9 MG/ML
INJECTION, SOLUTION INTRAVENOUS CONTINUOUS
Status: DISCONTINUED | OUTPATIENT
Start: 2021-04-13 | End: 2021-04-17 | Stop reason: HOSPADM

## 2021-04-13 RX ORDER — POTASSIUM CHLORIDE 20 MEQ/1
40 TABLET, EXTENDED RELEASE ORAL PRN
Status: DISCONTINUED | OUTPATIENT
Start: 2021-04-13 | End: 2021-04-17 | Stop reason: HOSPADM

## 2021-04-13 RX ORDER — INSULIN LISPRO 100 [IU]/ML
0-12 INJECTION, SOLUTION INTRAVENOUS; SUBCUTANEOUS
Status: DISCONTINUED | OUTPATIENT
Start: 2021-04-14 | End: 2021-04-17 | Stop reason: HOSPADM

## 2021-04-13 RX ORDER — SODIUM CHLORIDE 9 MG/ML
25 INJECTION, SOLUTION INTRAVENOUS PRN
Status: DISCONTINUED | OUTPATIENT
Start: 2021-04-13 | End: 2021-04-17 | Stop reason: HOSPADM

## 2021-04-13 RX ORDER — POTASSIUM CHLORIDE 20 MEQ/1
40 TABLET, EXTENDED RELEASE ORAL PRN
Status: DISCONTINUED | OUTPATIENT
Start: 2021-04-13 | End: 2021-04-13 | Stop reason: SDUPTHER

## 2021-04-13 RX ORDER — ASPIRIN 81 MG/1
324 TABLET, CHEWABLE ORAL ONCE
Status: COMPLETED | OUTPATIENT
Start: 2021-04-13 | End: 2021-04-13

## 2021-04-13 RX ADMIN — ASPIRIN 324 MG: 81 TABLET, CHEWABLE ORAL at 18:01

## 2021-04-13 ASSESSMENT — ENCOUNTER SYMPTOMS
NAUSEA: 0
COUGH: 0
DIARRHEA: 0
VOMITING: 0
RHINORRHEA: 0
ABDOMINAL PAIN: 0
SHORTNESS OF BREATH: 0

## 2021-04-13 ASSESSMENT — PAIN SCALES - GENERAL: PAINLEVEL_OUTOF10: 0

## 2021-04-13 NOTE — ED PROVIDER NOTES
of breath. There has been no reports of any fever or cough. Patient has no abdominal pain. There is been no nausea, vomiting or diarrhea. No dysuria or hematuria. The daughter states that she is unable to take care of her at this time, and she is thinking about placing her in a nursing home. Nursing Notes were all reviewed and agreed with or any disagreements were addressed in the HPI. REVIEW OF SYSTEMS    (2-9 systems for level 4, 10 or more for level 5)     Review of Systems   Constitutional: Positive for fatigue. Negative for activity change, appetite change, chills and fever. HENT: Negative for congestion and rhinorrhea. Respiratory: Negative for cough and shortness of breath. Cardiovascular: Negative for chest pain. Gastrointestinal: Negative for abdominal pain, diarrhea, nausea and vomiting. Genitourinary: Negative for difficulty urinating, dysuria and hematuria. Musculoskeletal: Positive for arthralgias. Skin: Negative for wound. Neurological: Negative for weakness and numbness. Positives and Pertinent negatives as per HPI. Except as noted above in the ROS, all other systems were reviewed and negative. PAST MEDICAL HISTORY     Past Medical History:   Diagnosis Date    Anxiety     Diabetic neuropathy ([address]     Dysphagia     Gait disorder     Hyperlipidemia     Hypertension     Meningioma ([address]     Poor memory     TIA (transient ischemic attack)     Type II or unspecified type diabetes mellitus without mention of complication, not stated as uncontrolled          SURGICAL HISTORY     Past Surgical History:   Procedure Laterality Date    CHOLECYSTECTOMY      COLONOSCOPY      HYSTEROSCOPY      LIPOMA RESECTION           CURRENTMEDICATIONS       Previous Medications    ASPIRIN 81 MG EC TABLET    Take 81 mg by mouth daily.       BLOOD GLUCOSE TEST STRIPS (ONETOUCH ULTRA) STRIP    TEST ONCE DAILY AS DIRECTED    CLOPIDOGREL (PLAVIX) 75 MG TABLET    TAKE ONE TABLET Laboratory  [address] [facility], [facility] [place] [email or web address]   Phone [phone]   COMPREHENSIVE METABOLIC PANEL - Abnormal; Notable for the following components:    BUN 22 (*)     Albumin 2.9 (*)     Albumin/Globulin Ratio 0.6 (*)     All other components within normal limits    Narrative:     Performed at:  [facility]  [address] [facility], [facility] [place]   Phone [phone]   TROPONIN - Abnormal; Notable for the following components:    Troponin 0.02 (*)     All other components within normal limits    Narrative:     Performed at:  [facility] [address], [facility], [facility] [place]   Phone 21  - Abnormal; Notable for the following components:    Pro-BNP 3,221 (*)     All other components within normal limits    Narrative:     Performed at:  [facility] [address], [facility], [facility] [place]   Phone [phone]   CULTURE, BLOOD 2   CULTURE, BLOOD 1   APTT    Narrative:     Performed at:  [facility] [address], [facility], [facility] [place]   Phone [phone]   PROTIME-INR    Narrative:     Performed at:  [facility] [address], [facility], [facility] [place]   Phone [phone]   LACTATE, SEPSIS    Narrative:     Performed at:  [facility] [address], [facility], [facility] [place]   Phone [phone]   URINE RT REFLEX TO CULTURE   LACTATE, SEPSIS       All other labs were within normal range or not returned as of this dictation. EKG: All EKG's are interpreted by the Emergency Department Physician in the absence of a cardiologist.  Please see their note for interpretation of EKG.       RADIOLOGY:   Non-plain film images such as CT, Ultrasound and MRI are read by the radiologist. Plain radiographic images are visualized and preliminarily interpreted by the ED Provider with the below findings:        Interpretation per the Radiologist below, if available at the time of this note:    CT PELVIS WO CONTRAST Additional Contrast? None   Preliminary Result   1. No evidence of acute pelvic or hip fracture. Diffuse osteopenia. Degenerative changes in the lower lumbar spine, SI joints and pubic symphysis. 2. Probable colonic diverticulum adjacent to the cecum with circumferential   wall thickening. Findings may be related to a chronic infectious or   inflammatory process although neoplasm is a possibility. Colonic   diverticulosis throughout the remainder of the visualized colon with no acute   features. XR CHEST PORTABLE   Final Result   No acute cardiopulmonary disease. XR HIP BILATERAL W AP PELVIS (2 VIEWS)   Final Result   Limited exam.      Question a fracture involving the right inferior pubic rami. Consider CT of   the pelvis as this exam is limited for detecting fractures. CT CERVICAL SPINE WO CONTRAST   Final Result   No acute abnormality of the cervical spine. CT HEAD WO CONTRAST   Final Result   No acute intracranial abnormality. Moderate cerebral atrophy appropriate for age. Moderate to large amount of   chronic ischemic change also appropriate for age. No significant change from   the prior study. Ct Head Wo Contrast    Result Date: 4/13/2021  EXAMINATION: CT OF THE HEAD WITHOUT CONTRAST  4/13/2021 12:48 pm TECHNIQUE: CT of the head was performed without the administration of intravenous contrast. Dose modulation, iterative reconstruction, and/or weight based adjustment of the mA/kV was utilized to reduce the radiation dose to as low as reasonably achievable. COMPARISON: 11/05/2012 HISTORY: ORDERING SYSTEM PROVIDED HISTORY: fall TECHNOLOGIST PROVIDED HISTORY: Reason for exam:->fall Has a \"code stroke\" or \"stroke alert\" been called? ->No Decision Support Exception->Emergency Medical Condition (MA) Reason for Exam: fall Acuity: Acute Type abnormality of the cervical spine. PROCEDURES   Unless otherwise noted below, none     Procedures    CRITICAL CARE TIME   N/A    CONSULTS:  IP CONSULT TO PRIMARY CARE PROVIDER      EMERGENCY DEPARTMENT COURSE and DIFFERENTIAL DIAGNOSIS/MDM:   Vitals:    Vitals:    04/13/21 1157 04/13/21 1158   BP: 111/74    Pulse: 96    Resp: 16    Temp: 98.4 °F (36.9 °C)    TempSrc: Oral    SpO2: 98%    Weight:  128 lb (58.1 kg)   Height:  5' 6\" (1.676 m)       Patient was given the following medications:  Medications - No data to display        Briefly, this is a 80-year-old female who presents emergency department today with her daughter for evaluation for general fatigue. The daughter states that the patient has had general fatigue and frequent falls that has been ongoing for the past 3 days. The daughter is tearful and states that she does not feel that she can take care of the patient any longer    Patient is complaining of pain to the right lateral hip, she does have tenderness palpation on examination, x-ray shows a limited exam.  Question fracture involving the right inferior pubic rami, CT of pelvis obtained, there is no evidence of acute pelvic or hip fracture she does have diffuse osteopenia and degenerative changes. Probable colonic diverticulum adjacent to the cecum. Likely chronic inflammatory process. Patient has not had any reports of any diarrhea she has no leukocytosis, do not suspect an acute inflammatory process. EKG is documented by Dr. Jordan Simental, please see his note for further details    CBC shows no evidence of leukocytosis, chronic anemia similar to previous. CMP is unremarkable. Troponin is elevated 0.02, patient has no complaints of any pain, will cover with aspirin    Lactic acid is normal.  BNP is elevated however chest x-ray is negative. CT of head and cervical spine showed no acute process.     The daughter does not feel that she can take care of the patient any longer, and she does

## 2021-04-13 NOTE — ED NOTES
Pharmacy Medication History Note      List of current medications patient is taking is complete. Source of information: Tesfaye Hui 22 made to medication list:  Medications flagged for removal (include reason, ex. noncompliance):  none    Medications removed (include reason, ex. therapy complete or physician discontinued):  See list below- therapy completed    Medications added/doses adjusted:  none    Other notes (ex. Recent course of antibiotics, Coumadin dosing):  Denies use of other OTC or herbal medications. Last dose times updated. Norah Hector, PharmD  ED Pharmacist [id number]  4/13/2021    No current facility-administered medications on file prior to encounter. Current Outpatient Medications on File Prior to Encounter   Medication Sig Dispense Refill    gentamicin (GARAMYCIN) 0.1 % ointment Apply topically 2 times daily       triamcinolone (KENALOG) 0.1 % cream Apply topically 2 times daily       gabapentin (NEURONTIN) 100 MG capsule TAKE ONE CAPSULE BY MOUTH THREE TIMES A DAY 45 capsule 0    metoprolol succinate (TOPROL XL) 25 MG extended release tablet TAKE ONE TABLET BY MOUTH DAILY 90 tablet 2    pravastatin (PRAVACHOL) 40 MG tablet TAKE ONE TABLET BY MOUTH DAILY 87 tablet 1    Lancets (ONETOUCH DELICA PLUS [id number]) MISC TEST ONCE DAILY AS DIRECTED 100 each 10    nabumetone (RELAFEN) 500 MG tablet TAKE ONE TABLET BY MOUTH TWICE A DAY 60 tablet 2    metFORMIN (GLUCOPHAGE) 500 MG tablet TAKE 1 TABLET BY MOUTH TWICE DAILY WITH MEALS 180 tablet 3    lisinopril (PRINIVIL;ZESTRIL) 10 MG tablet TAKE 1 TABLET BY MOUTH TWICE DAILY 180 tablet 3    hydroCHLOROthiazide (MICROZIDE) 12.5 MG capsule TAKE ONE CAPSULE BY MOUTH EVERY MORNING 90 capsule 3    donepezil (ARICEPT) 10 MG tablet TAKE 1 TABLET BY MOUTH EVERY NIGHT AT BEDTIME 90 tablet 3    clopidogrel (PLAVIX) 75 MG tablet TAKE ONE TABLET BY MOUTH DAILY 90 tablet 3    aspirin 81 MG EC tablet Take 81 mg by mouth daily. [DISCONTINUED] blood glucose test strips (ONETOUCH ULTRA) strip TEST ONCE DAILY AS DIRECTED 75 strip 10    [DISCONTINUED] montelukast (SINGULAIR) 10 MG tablet Take 10 mg by mouth nightly      [DISCONTINUED] omeprazole (PRILOSEC) 40 MG delayed release capsule Take 40 mg by mouth daily      [DISCONTINUED] Glucosamine-Chondroitin (GLUCOSAMINE CHONDR COMPLEX PO) Take  by mouth daily. [DISCONTINUED] Multiple Vitamins-Minerals (CENTRUM SILVER) TABS Take 1 tablet by mouth daily.

## 2021-04-13 NOTE — CARE COORDINATION
Discharge Planning Assessment    SW discharge planner met with patient to discuss reason for admission, current living situation, and potential needs at the time of discharge. Pt wasn't fully oriented so SW completed assessment mostly with pt's dtr. Demographics/Insurance verified:  Yes    Current type of dwellin story - 7 steps in    Living arrangements:  Alone but dtr and grandtr assist with care during the day and night, but not 24/7. Level of function/Support:  Dtr reported pt is currently bed bound. Pt is complete assist for transfers and ambulation which dtr has been providing during the day and grandtr at night. Dtr reported \"I can't do this anymore. \"  Stated she is looking for possible LTC. PCP:  Dr. Nupur García    Last Visit to PCP:  Last week    DME:  Did not ask    Active with any community resources/agencies/skilled home care:  None. Explained [facility] and [facility] services. Medication compliance issues:  No    Financial issues that could impact healthcare:  No    Tentative discharge plan:  SNF most likely. Gave dtr [employer] and Medicare Rating lists. Informed to pick at least 3 choices. Will talk with other family members and make choices. PT/OT pending. Dtr most likely looking for LTC after SNF for patient. Discussed and provided facilities of choice if transition to a skilled nursing facility is required at the time of discharge    Transportation at the time of discharge:  Pt will need ambulance transport.     Electronically signed by OCTAVIA Redmond Ace, LSW on 2021 at 7:47 PM

## 2021-04-13 NOTE — ED PROVIDER NOTES
EKG is reviewed myself. Dated today at 80. Rate 101. Sinus tach. No priors.      Bryce Cardona MD  04/13/21 [id number]

## 2021-04-13 NOTE — H&P
mg by mouth daily   Yes Historical Provider, MD   Glucosamine-Chondroitin (GLUCOSAMINE CHONDR COMPLEX PO) Take  by mouth daily. Yes Historical Provider, MD   aspirin 81 MG EC tablet Take 81 mg by mouth daily. Yes Historical Provider, MD   Multiple Vitamins-Minerals (CENTRUM SILVER) TABS Take 1 tablet by mouth daily. Historical Provider, MD         No Known Allergies          EXAM: (2-7 system for EPF/Detailed, ?8 for Comprehensive)  /74   Pulse 96   Temp 98.4 °F (36.9 °C) (Oral)   Resp 16   Ht 5' 6\" (1.676 m)   Wt 128 lb (58.1 kg)   SpO2 98%   BMI 20.66 kg/m²   Constitutional: vitals as above: alert and appears stated age  Head: Normocephalic, without obvious abnormality, atraumatic  Eyes:lids and lashes normal, conjunctivae and sclerae normal and pupils equal, round, reactive to light and accomodation  EMNT: external ears normal, lips normal  Neck: no adenopathy, supple, symmetrical, trachea midline and thyroid not enlarged, symmetric, no tenderness/mass/nodules   Respiratory: clear to auscultation and percussion bilaterally with normal respiratory effort  Cardiovascular: normal rate, regular rhythm, normal S1 and S2 and no carotid bruits  Gastrointestinal: soft, non-tender, non-distended, normal bowel sounds, no masses or organomegaly  Lymphatic:   Extremities: no edema, no clubbing  Skin:No rashes or nodules noted.   Neurologic:    LABS:  Labs Reviewed   CBC WITH AUTO DIFFERENTIAL - Abnormal; Notable for the following components:       Result Value    RBC 3.19 (*)     Hemoglobin 9.5 (*)     Hematocrit 30.2 (*)     RDW 15.5 (*)     Neutrophils Absolute 8.1 (*)     All other components within normal limits    Narrative:     Performed at:  [facility] [address], [place], [address]   Phone [phone]   COMPREHENSIVE METABOLIC PANEL - Abnormal; Notable for the following components:    BUN 22 (*)     Albumin 2.9 (*)     Albumin/Globulin Ratio 0.6 (*) All other components within normal limits    Narrative:     Performed at:  [facility] [address], [facility], [address]   Phone [phone]   TROPONIN - Abnormal; Notable for the following components:    Troponin 0.02 (*)     All other components within normal limits    Narrative:     Performed at:  [facility] [address], [facility], [address]   Phone 21  - Abnormal; Notable for the following components:    Pro-BNP 3,221 (*)     All other components within normal limits    Narrative:     Performed at:  [facility] [address], [facility], [address] [employer]   Phone [phone]   CULTURE, BLOOD 2   CULTURE, BLOOD 1   APTT    Narrative:     Performed at:  [facility] [address], [facility], [facility] [employer]   Phone [phone]   PROTIME-INR    Narrative:     Performed at:  [facility] [address], [facility], [address] [employer]   Phone [phone]   LACTATE, SEPSIS    Narrative:     Performed at:  [facility] [address], [facility], [address] [employer]   Phone [phone]   URINE RT REFLEX TO CULTURE   LACTATE, SEPSIS         IMAGING:  Imaging results from the ER have been reviewed in the computerized chart. Xr Hip Bilateral W Ap Pelvis (2 Views)    Result Date: 4/13/2021  EXAMINATION: ONE XRAY VIEW OF THE PELVIS AND TWO XRAY VIEWS OF EACH OF THE BILATERAL HIPS 4/13/2021 1:19 pm COMPARISON: None.  HISTORY: ORDERING SYSTEM PROVIDED HISTORY: fall TECHNOLOGIST PROVIDED HISTORY: Reason for exam:->fall Reason for Exam: Fall (pt family member states pt with multiple recent falls- states lives by herself, but daughter lives close by and helps her- daughter states she is unable to take care of her full time would possibly like nursing home placement) Acuity: Acute Type of Exam: Initial FINDINGS: Evaluation of the pelvis limited due to limitations in patient positioning. Limited evaluation of the pubic symphysis and sacrum. Question a nondisplaced fracture of the right inferior pubic ramus. No widening of the SI joints bilaterally. Subchondral sclerosis with marginal osteophytes seen in the SI joints bilaterally No evidence of a proximal femoral fracture dislocation. Vascular calcifications are noted. Degenerative changes seen in the lower lumbar spine     Limited exam. Question a fracture involving the right inferior pubic rami. Consider CT of the pelvis as this exam is limited for detecting fractures. Ct Head Wo Contrast    Result Date: 4/13/2021  EXAMINATION: CT OF THE HEAD WITHOUT CONTRAST  4/13/2021 12:48 pm TECHNIQUE: CT of the head was performed without the administration of intravenous contrast. Dose modulation, iterative reconstruction, and/or weight based adjustment of the mA/kV was utilized to reduce the radiation dose to as low as reasonably achievable. COMPARISON: 11/05/2012 HISTORY: ORDERING SYSTEM PROVIDED HISTORY: fall TECHNOLOGIST PROVIDED HISTORY: Reason for exam:->fall Has a \"code stroke\" or \"stroke alert\" been called? ->No Decision Support Exception->Emergency Medical Condition (MA) Reason for Exam: fall Acuity: Acute Type of Exam: Initial FINDINGS: BRAIN/VENTRICLES: There is no acute intracranial hemorrhage, mass effect or midline shift. No abnormal extra-axial fluid collection. The gray-white differentiation is maintained without evidence of an acute infarct. There is prominence of the ventricles and sulci due to global parenchymal volume loss. There are nonspecific areas of hypoattenuation within the periventricular and subcortical white matter, which likely represent chronic microvascular ischemic change. ORBITS: The visualized portion of the orbits demonstrate no acute abnormality.  SINUSES: The visualized paranasal sinuses and mastoid air cells demonstrate no acute abnormality. SOFT TISSUES/SKULL: No acute abnormality of the visualized skull or soft tissues. No acute intracranial abnormality. Moderate cerebral atrophy appropriate for age. Moderate to large amount of chronic ischemic change also appropriate for age. No significant change from the prior study. Ct Cervical Spine Wo Contrast    Result Date: 4/13/2021  EXAMINATION: CT OF THE CERVICAL SPINE WITHOUT CONTRAST 4/13/2021 12:49 pm TECHNIQUE: CT of the cervical spine was performed without the administration of intravenous contrast. Multiplanar reformatted images are provided for review. Dose modulation, iterative reconstruction, and/or weight based adjustment of the mA/kV was utilized to reduce the radiation dose to as low as reasonably achievable. COMPARISON: None. HISTORY: ORDERING SYSTEM PROVIDED HISTORY: fall TECHNOLOGIST PROVIDED HISTORY: Reason for exam:->fall Decision Support Exception->Emergency Medical Condition (MA) Reason for Exam: fall Acuity: Acute Type of Exam: Initial FINDINGS: BONES/ALIGNMENT: There is no acute fracture or traumatic malalignment. DEGENERATIVE CHANGES: No significant degenerative changes considering patient's age. There is narrowing and mild spurring C6-7. SOFT TISSUES: There is no prevertebral soft tissue swelling. No acute abnormality of the cervical spine. Ct Pelvis Wo Contrast Additional Contrast? None    Result Date: 4/13/2021  EXAMINATION: CT OF THE PELVIS WITHOUT CONTRAST 4/13/2021 3:58 pm TECHNIQUE: CT of the pelvis was performed without the administration of intravenous contrast.  Multiplanar reformatted images are provided for review. Dose modulation, iterative reconstruction, and/or weight based adjustment of the mA/kV was utilized to reduce the radiation dose to as low as reasonably achievable. COMPARISON: Plain films performed earlier today.  HISTORY: ORDERING SYSTEM PROVIDED HISTORY: r/o hip fx and rami fx TECHNOLOGIST PROVIDED HISTORY: Reason for exam:->r/o hip fx and rami fx Additional Contrast?->None Decision Support Exception->Emergency Medical Condition (MA) Reason for Exam: r/o hip fx and rami fx. Acuity: Acute Type of Exam: Initial FINDINGS: The bones are diffusely osteopenic. There is no evidence of acute fracture of the pelvis or either hip. Mild symmetric osteoarthritic changes of the SI joints. Moderate degenerative change at the pubic symphysis. There is minimal symmetric osteoarthritic changes of the hips. Degenerative disc disease at L4-5 and L5-S1 with lower lumbar facet arthropathy. Grade 1 anterolisthesis of L4 on L5 and L5 on S1 is noted. There is mild edematous changes in the subcutaneous fat with no focal subcutaneous abnormality. Within the pelvis, colonic diverticulosis is noted in the sigmoid colon. There is a probable diverticulum in the cecal region with marked circumferential wall thickening measuring up to 1.3 cm. Cystic area in the left adnexa measuring 1.8 cm. No free pelvic fluid. Partial distention of the urinary bladder. Multiple calcified pelvic phleboliths. Iliac and femoral vascular calcification. 1. No evidence of acute pelvic or hip fracture. Diffuse osteopenia. Degenerative changes in the lower lumbar spine, SI joints and pubic symphysis. 2. Probable colonic diverticulum adjacent to the cecum with circumferential wall thickening. Findings may be related to a chronic infectious or inflammatory process although neoplasm is a possibility. Colonic diverticulosis throughout the remainder of the visualized colon with no acute features. Xr Chest Portable    Result Date: 4/13/2021  EXAMINATION: ONE XRAY VIEW OF THE CHEST 4/13/2021 1:19 pm COMPARISON: None.  HISTORY: ORDERING SYSTEM PROVIDED HISTORY: SOB TECHNOLOGIST PROVIDED HISTORY: Reason for exam:->SOB Reason for Exam: Fall (pt family member states pt with multiple recent falls- states lives by herself, but daughter lives close by and helps her- daughter states she is unable to take care of her full time would possibly like nursing home placement) Acuity: Acute Type of Exam: Initial FINDINGS: The cardiomediastinal silhouette is borderline in size. The lungs are clear. No infiltrate, pleural fluid or evidence of overt failure. Osseous structures are intact     No acute cardiopulmonary disease. EKG: from ER, interpreted by self, sinus tach at 101. No acute st elevation noted. No TWI seen. Comparison made to ekg in old chart dated 8/4/11, there is some difference as older study is sinus arrythmia at 70          MEDICAL DECISION MAKING:    Principal Problem:    Late onset Alzheimer's disease without behavioral disturbance ([facility] [address] -Established problem. Stable. Plan: pt/ot to see. Pt likely needs placement  Active Problems:    Diabetes mellitus type 2, controlled ([facility] [address] -Established problem. Stable. Sugars ok  Plan: Patient placed on controlled carbohydrate diet. Fingerstick sugars to be checked to monitor for both hypoglycemia as well as hyperglycemia. Sliding scale insulin ordered. Glucagon and dextrose ordered for hypoglycemia. Patient will be continued on home medications. Hemoglobin a1c to be ordered to assess efficacy of therapy. Pure hypercholesterolemia -Established problem. Stable. Plan: cont statin    Diabetic neuropathy ([facility] [address]  Plan: Continue present orders/plan. Hypertension -Established problem. Stable. 111/74  Plan: Pt home BP meds reviewed and will be continued. IV Hydralazine ordered for control of extremely high blood pressures   Will monitor labs to assess Creat/K for possible complications of medications. Diverticulitis? - New Problem to me. Plan - iv abx started      Diagnoses as listed above, designated as new or established and plan outlined for each. Data Reviewed:   (1) Lab tests were reviewed or ordered. (1) Radiology tests were reviewed or ordered. (1) Medical test (Echo, EKG, PFT/koko) were ordered.   (1)History was

## 2021-04-14 PROBLEM — K57.32 DIVERTICULITIS LARGE INTESTINE: Status: ACTIVE | Noted: 2021-04-14

## 2021-04-14 PROBLEM — E44.0 MODERATE MALNUTRITION (HCC): Chronic | Status: ACTIVE | Noted: 2021-04-14

## 2021-04-14 LAB
A/G RATIO: 0.6 (ref 1.1–2.2)
ALBUMIN SERPL-MCNC: 2.5 G/DL (ref 3.4–5)
ALP BLD-CCNC: 88 U/L (ref 40–129)
ALT SERPL-CCNC: 16 U/L (ref 10–40)
ANION GAP SERPL CALCULATED.3IONS-SCNC: 14 MMOL/L (ref 3–16)
ANISOCYTOSIS: ABNORMAL
AST SERPL-CCNC: 22 U/L (ref 15–37)
BANDED NEUTROPHILS RELATIVE PERCENT: 10 % (ref 0–7)
BASOPHILS ABSOLUTE: 0 K/UL (ref 0–0.2)
BASOPHILS RELATIVE PERCENT: 0 %
BILIRUB SERPL-MCNC: 0.3 MG/DL (ref 0–1)
BUN BLDV-MCNC: 19 MG/DL (ref 7–20)
CALCIUM SERPL-MCNC: 9.7 MG/DL (ref 8.3–10.6)
CHLORIDE BLD-SCNC: 108 MMOL/L (ref 99–110)
CO2: 20 MMOL/L (ref 21–32)
CREAT SERPL-MCNC: 0.7 MG/DL (ref 0.6–1.2)
EOSINOPHILS ABSOLUTE: 0.1 K/UL (ref 0–0.6)
EOSINOPHILS RELATIVE PERCENT: 1 %
ESTIMATED AVERAGE GLUCOSE: 134.1 MG/DL
GFR AFRICAN AMERICAN: >60
GFR NON-AFRICAN AMERICAN: >60
GLOBULIN: 4.1 G/DL
GLUCOSE BLD-MCNC: 103 MG/DL (ref 70–99)
GLUCOSE BLD-MCNC: 206 MG/DL (ref 70–99)
GLUCOSE BLD-MCNC: 50 MG/DL (ref 70–99)
GLUCOSE BLD-MCNC: 73 MG/DL (ref 70–99)
GLUCOSE BLD-MCNC: 81 MG/DL (ref 70–99)
GLUCOSE BLD-MCNC: 82 MG/DL (ref 70–99)
GLUCOSE BLD-MCNC: 88 MG/DL (ref 70–99)
GLUCOSE BLD-MCNC: 92 MG/DL (ref 70–99)
HBA1C MFR BLD: 6.3 %
HCT VFR BLD CALC: 28.3 % (ref 36–48)
HEMOGLOBIN: 8.7 G/DL (ref 12–16)
LYMPHOCYTES ABSOLUTE: 1.6 K/UL (ref 1–5.1)
LYMPHOCYTES RELATIVE PERCENT: 15 %
MCH RBC QN AUTO: 29.1 PG (ref 26–34)
MCHC RBC AUTO-ENTMCNC: 30.6 G/DL (ref 31–36)
MCV RBC AUTO: 95.1 FL (ref 80–100)
MONOCYTES ABSOLUTE: 0.2 K/UL (ref 0–1.3)
MONOCYTES RELATIVE PERCENT: 2 %
NEUTROPHILS ABSOLUTE: 8.5 K/UL (ref 1.7–7.7)
NEUTROPHILS RELATIVE PERCENT: 72 %
PDW BLD-RTO: 15.6 % (ref 12.4–15.4)
PERFORMED ON: ABNORMAL
PERFORMED ON: NORMAL
PLATELET # BLD: 261 K/UL (ref 135–450)
PLATELET SLIDE REVIEW: ADEQUATE
PMV BLD AUTO: 8.6 FL (ref 5–10.5)
POTASSIUM REFLEX MAGNESIUM: 3.8 MMOL/L (ref 3.5–5.1)
RBC # BLD: 2.98 M/UL (ref 4–5.2)
SLIDE REVIEW: ABNORMAL
SODIUM BLD-SCNC: 142 MMOL/L (ref 136–145)
TOTAL PROTEIN: 6.6 G/DL (ref 6.4–8.2)
TOXIC GRANULATION: PRESENT
WBC # BLD: 10.4 K/UL (ref 4–11)

## 2021-04-14 PROCEDURE — 2580000003 HC RX 258: Performed by: INTERNAL MEDICINE

## 2021-04-14 PROCEDURE — 1200000000 HC SEMI PRIVATE

## 2021-04-14 PROCEDURE — 96366 THER/PROPH/DIAG IV INF ADDON: CPT

## 2021-04-14 PROCEDURE — 36415 COLL VENOUS BLD VENIPUNCTURE: CPT

## 2021-04-14 PROCEDURE — 96365 THER/PROPH/DIAG IV INF INIT: CPT

## 2021-04-14 PROCEDURE — 97530 THERAPEUTIC ACTIVITIES: CPT

## 2021-04-14 PROCEDURE — 6370000000 HC RX 637 (ALT 250 FOR IP): Performed by: INTERNAL MEDICINE

## 2021-04-14 PROCEDURE — G0378 HOSPITAL OBSERVATION PER HR: HCPCS

## 2021-04-14 PROCEDURE — 96367 TX/PROPH/DG ADDL SEQ IV INF: CPT

## 2021-04-14 PROCEDURE — 80053 COMPREHEN METABOLIC PANEL: CPT

## 2021-04-14 PROCEDURE — 2500000003 HC RX 250 WO HCPCS: Performed by: INTERNAL MEDICINE

## 2021-04-14 PROCEDURE — 97166 OT EVAL MOD COMPLEX 45 MIN: CPT

## 2021-04-14 PROCEDURE — 96372 THER/PROPH/DIAG INJ SC/IM: CPT

## 2021-04-14 PROCEDURE — 97162 PT EVAL MOD COMPLEX 30 MIN: CPT

## 2021-04-14 PROCEDURE — 85025 COMPLETE CBC W/AUTO DIFF WBC: CPT

## 2021-04-14 PROCEDURE — 6360000002 HC RX W HCPCS: Performed by: INTERNAL MEDICINE

## 2021-04-14 RX ORDER — CIPROFLOXACIN 2 MG/ML
400 INJECTION, SOLUTION INTRAVENOUS EVERY 12 HOURS
Status: DISCONTINUED | OUTPATIENT
Start: 2021-04-14 | End: 2021-04-17 | Stop reason: HOSPADM

## 2021-04-14 RX ADMIN — ENOXAPARIN SODIUM 40 MG: 40 INJECTION SUBCUTANEOUS at 08:30

## 2021-04-14 RX ADMIN — METRONIDAZOLE 500 MG: 500 INJECTION, SOLUTION INTRAVENOUS at 08:28

## 2021-04-14 RX ADMIN — METRONIDAZOLE 500 MG: 500 INJECTION, SOLUTION INTRAVENOUS at 16:50

## 2021-04-14 RX ADMIN — Medication 15 G: at 00:04

## 2021-04-14 RX ADMIN — CIPROFLOXACIN 400 MG: 2 INJECTION, SOLUTION INTRAVENOUS at 09:31

## 2021-04-14 RX ADMIN — SODIUM CHLORIDE: 9 INJECTION, SOLUTION INTRAVENOUS at 00:07

## 2021-04-14 RX ADMIN — Medication 10 ML: at 00:09

## 2021-04-14 RX ADMIN — INSULIN LISPRO 4 UNITS: 100 INJECTION, SOLUTION INTRAVENOUS; SUBCUTANEOUS at 11:38

## 2021-04-14 RX ADMIN — Medication 15 G: at 00:07

## 2021-04-14 RX ADMIN — CIPROFLOXACIN 400 MG: 2 INJECTION, SOLUTION INTRAVENOUS at 20:28

## 2021-04-14 ASSESSMENT — PAIN SCALES - WONG BAKER: WONGBAKER_NUMERICALRESPONSE: 8

## 2021-04-14 ASSESSMENT — PAIN DESCRIPTION - ORIENTATION: ORIENTATION: RIGHT

## 2021-04-14 NOTE — PROGRESS NOTES
Comprehensive Nutrition Assessment    Type and Reason for Visit:  Initial, Positive Nutrition Screen    Nutrition Recommendations/Plan:   Obtain current body weight  Glucerna BID      Nutrition Assessment:  Pt triggered nutrition eval for wounds. Also noted to have swallowing difficulty; pt will chew food then spit it out (observed this on breakfast tray). Recommend SLP swallow eval for appropriate diet. Admission wt is estimated, pt appears be be underweight as nutrition focused physical exam shows both muscle and fat loss; refer to assessment below. Pt reports good appetite and stable wt, but unable to state UBW. Observed breakfast try, pt consumed ~75% of yogurt and some of her cereal.  Assisted pt with lunch tray set-up during visit. Pt agreed to oral nutrition supplement. Malnutrition Assessment:  Malnutrition Status: Moderate malnutrition    Context:  Chronic Illness     Findings of the 6 clinical characteristics of malnutrition:  Energy Intake:  No significant decrease in energy intake(per pt)  Weight Loss:  Unable to assess     Body Fat Loss:  1 - Mild body fat loss Triceps   Muscle Mass Loss:  7 - Severe muscle mass loss Clavicles (pectoralis & deltoids)  Fluid Accumulation:  No significant fluid accumulation     Strength:  Not Performed    Estimated Daily Nutrient Needs: Will evaluate once CBW obtained. Energy (kcal):     Protein (g):      Fluid (ml/day):        Nutrition Related Findings:  disoriented to time; +1 pitting BLE edema; active BS; NS @ 75mL/hr      Wounds:  Multiple       Current Nutrition Therapies:    DIET CARB CONTROL;   Dietary Nutrition Supplements: Diabetic Oral Supplement    Anthropometric Measures:  · Height: 5' 6\" (167.6 cm)  · Current Body Weight: 128 lb (58.1 kg) ESTIMATED UPON ADMISSION    Nutrition Diagnosis:   · Increased nutrient needs related to increase demand for energy/nutrients as evidenced by wounds      Nutrition Interventions:   Food and/or Nutrient Delivery:  Continue Current Diet, Start Oral Nutrition Supplement  Nutrition Education/Counseling:  Education not indicated   Coordination of Nutrition Care:  Continue to monitor while inpatient    Goals:  PO intake 50% or greater of meals & supplement       Nutrition Monitoring and Evaluation:   Behavioral-Environmental Outcomes:  None Identified   Food/Nutrient Intake Outcomes:  Food and Nutrient Intake, Supplement Intake  Physical Signs/Symptoms Outcomes:  Weight, Skin     Discharge Planning:     Too soon to determine     Electronically signed by Ramila Leblanc RD, LD on 4/14/21 at 11:38 AM EDT  Contact: [phone]

## 2021-04-14 NOTE — PLAN OF CARE
Nutrition Problem #1: Increased nutrient needs  Intervention: Food and/or Nutrient Delivery: Continue Current Diet, Start Oral Nutrition Supplement  Nutritional Goals: PO intake 50% or greater of meals & supplement

## 2021-04-14 NOTE — CARE COORDINATION
Attempted to meet w/pt and family member to discuss SNF options. [address] daughter stated that pt's daughter will be up to discuss. Call placed to daughter to discuss-left voicemail message. Electronically signed by Kim Riggins 4/14/2021 at 3:04 PM    ADDENDUM:  Received call from daughter-she stated she has consulted w/family and would like referrals to [facility] and [facility]. Referrals faxed-left message for admissions.   Electronically signed by Kim Riggins 4/14/2021 at 3:24 PM

## 2021-04-14 NOTE — PROGRESS NOTES
Occupational Therapy   Occupational Therapy Initial Assessment  Date: 2021   Patient Name: Сергей Perez  MRN: [id number]     : 1931    Date of Service: 2021    Discharge Recommendations:Tere Spencer scored a  on the AM-PAC ADL Inpatient form. Current research shows that an AM-PAC score of 17 or less is typically not associated with a discharge to the patient's home setting. Based on the patient's AM-PAC score and their current ADL deficits, it is recommended that the patient have 3-5 sessions per week of Occupational Therapy at d/c to increase the patient's independence. Please see assessment section for further patient specific details. If patient discharges prior to next session this note will serve as a discharge summary. Please see below for the latest assessment towards goals. OT Equipment Recommendations  Equipment Needed: No    Assessment   Performance deficits / Impairments: Decreased functional mobility ; Decreased ADL status; Decreased safe awareness;Decreased cognition;Decreased balance;Decreased endurance  Assessment: Patient presents with the above deficits impacting occupational performance and functioning below baseline level. Recommend skilled OT to address defiicts and promote functional independence followed by post acute stay. Patient is unsafe at this time to stay in her home alone. Treatment Diagnosis: Decreased functional mobility, ADLs, safety, cognition, endurance associated with failure to thrive. Prognosis: Fair  Decision Making: Medium Complexity  OT Education: OT Role;Plan of Care  Patient Education: Patient is dependent for learning. Will require reinforcement and assist  Barriers to Learning: hearing/cognition  REQUIRES OT FOLLOW UP: Yes  Activity Tolerance  Activity Tolerance: Patient limited by pain;Treatment limited secondary to decreased cognition  Safety Devices  Safety Devices in place: Yes  Type of devices:  All fall risk precautions in lot  Pain Type: Chronic pain  Pain Location: Hip  Pain Orientation: Right  O2 Device: None (Room air)  Social/Functional History  Social/Functional History  Lives With: Alone  Type of Home: House  Home Layout: One level  Home Access: Stairs to enter with rails  Entrance Stairs - Number of Steps: 5-6 MERLY  Entrance Stairs - Rails: Both  Bathroom Shower/Tub: Tub/Shower unit, Shower chair with back  Bathroom Toilet: Standard  Bathroom Equipment: Shower chair  Home Equipment: 4 wheeled walker, Cane, Lift chair  ADL Assistance: Needs assistance  Homemaking Assistance: Needs assistance(daughter assists with all I ADLS)  Ambulation Assistance: Needs assistance(4WW stays in chair unless daughter there)  Transfer Assistance: Needs assistance  Active : No  Additional Comments: Multiple falls recently. Unable to recall amount       Objective   Vision: Impaired  Vision Exceptions: Wears glasses for reading  Hearing: Exceptions to [facility]  Hearing Exceptions: Hard of hearing/hearing concerns; No hearing aid    Orientation  Overall Orientation Status: (patient delayed with time, states she just remembered her last fall this morning. Situation orientation questionable.)  Observation/Palpation  Posture: Poor  Balance  Sitting Balance: (Initially extends at trunk but able to maintain sitting with increased time)  Standing Balance: Dependent/Total(max of 2 with use of kassandra stedy and max cues)  Functional Mobility  Functional - Mobility Device: Other(kassandra stedy)  Assist Level: Dependent/Total(Max of 2 sit to stand with steady)  Functional Mobility Comments: Pt. required max verbal cues and [place] assist to use kassandra stedy for sit to stand from bed to chair  ADL  Feeding: Setup(Patient lying in bed on R side feeding self. Cereal spilled in bed. Utensils laying on chest.  Able to self feed with set up when up in chair)  LE Dressing: Dependent/Total(Patient extends posterior sitting on EOB.   In chair dependent to thread brief.)  Toileting: Dependent/Total(Incontinent during transfers)  Tone RUE  RUE Tone: Normotonic  Coordination  Coordination and Movement description: Decreased speed;Right UE;Left UE     Bed mobility  Rolling to Right: Maximum assistance(max of 1)  Supine to Sit: Maximum assistance(max of 1)  Comment: max verbal cues and [place] assist to roll to right  Transfers  Sit to stand: Dependent/Total(max of 2 with kassandrajere levinedy)  Stand to sit: Dependent/Total(Max of 2 Stedy)     Cognition  Overall Cognitive Status: Exceptions  Arousal/Alertness: Appropriate responses to stimuli  Following Commands: Follows one step commands with repetition  Attention Span: Difficulty attending to directions; Difficulty dividing attention  Memory: Decreased recall of biographical Information;Decreased short term memory;Decreased recall of recent events  Safety Judgement: Decreased awareness of need for safety  Problem Solving: Decreased awareness of errors  Insights: Decreased awareness of deficits  Initiation: Requires cues for all  Sequencing: Requires cues for all  Cognition Comment: Patient verbalized she was aware of when she needed to urinate, but was incontinent during transfers and brief change without prior acknowledgement.   Perception  Overall Perceptual Status: Impaired  Initiation: Hand over hand to initiate tasks  Motor Planning: Cues to use objects appropriately                 Plan   Plan  Times per week: 3-5  Times per day: Daily  Current Treatment Recommendations: Functional Mobility Training, Endurance Training, Safety Education & Training, Self-Care / ADL, Equipment Evaluation, Education, & procurement, Patient/Caregiver Education & Training    AM-PAC Score        AM-[facility] Inpatient Daily Activity Raw Score: 12 (04/14/21 1024)  AM-PAC Inpatient ADL T-Scale Score : 30.6 (04/14/21 1024)  ADL Inpatient CMS 0-100% Score: 66.57 (04/14/21 1024)  ADL Inpatient CMS G-Code Modifier : CL (04/14/21 1024)    Goals  Short term goals  Time Frame for Short term goals: Discharge  Short term goal 1: Max of 1 for functional ADL transfer  Short term goal 2: Max of 1 for functional stance to prepare for transfers  Short term goal 3: Grooming task with set up  Short term goal 4: Dressing/bathing UE with Mod A  Long term goals  Time Frame for Long term goals : LTG=STG       Therapy Time   Individual Concurrent Group Co-treatment   Time In 0916         Time Out 0944         Minutes 28               Timed Code Treatment Minutes:  14 Minutes    Total Treatment Minutes:  [address] [facility], [address] [place] [address] Martine Graves 103

## 2021-04-14 NOTE — CONSULTS
Gastroenterology Consult Note        Patient: Dara Tucker  : 1931  Acct#:      Date:  2021    Subjective:       History of Present Illness  Patient is a 80 y.o.   female admitted with Failure to thrive in adult [R62.7] who is seen in consult for diverticulitis. H/o dementia, DM. She has been living with family at home. She was brought to the ED yesterday for frequent falls. She had right hip pain so CT pelvis was done and showed probable diverticulum adjacent to the cecum with circumferential wall thickening which could be from inflammatory process vs neoplasm. She denies abdominal pain, N/V, melena, hematochezia, constipation, diarrhea. Of note, she was seen by Dr Avani Cobb 2020 for blood in stool and positive FIT test. She declined colonoscopy. Past Medical History:   Diagnosis Date    Anxiety     Diabetic neuropathy ([facility] [address]     Dysphagia     Gait disorder     Hyperlipidemia     Hypertension     Meningioma (HCC)     Poor memory     TIA (transient ischemic attack)     Type II or unspecified type diabetes mellitus without mention of complication, not stated as uncontrolled       Past Surgical History:   Procedure Laterality Date    CHOLECYSTECTOMY      COLONOSCOPY      HYSTEROSCOPY      LIPOMA RESECTION        Past Endoscopic History: colonoscopy  with Dr Avani Cobb: colonoscopy good prep, lipoma at ic valve, tics, hemorrhoids, no polyps. Admission Meds  No current facility-administered medications on file prior to encounter.       Current Outpatient Medications on File Prior to Encounter   Medication Sig Dispense Refill    gentamicin (GARAMYCIN) 0.1 % ointment Apply topically 2 times daily       triamcinolone (KENALOG) 0.1 % cream Apply topically 2 times daily       gabapentin (NEURONTIN) 100 MG capsule TAKE ONE CAPSULE BY MOUTH THREE TIMES A DAY 45 capsule 0    metoprolol succinate (TOPROL XL) 25 MG extended release tablet TAKE ONE TABLET BY MOUTH DAILY 90 tablet 2    pravastatin (PRAVACHOL) 40 MG tablet TAKE ONE TABLET BY MOUTH DAILY 87 tablet 1    Lancets (ONETOUCH DELICA PLUS [id number]) MISC TEST ONCE DAILY AS DIRECTED 100 each 10    nabumetone (RELAFEN) 500 MG tablet TAKE ONE TABLET BY MOUTH TWICE A DAY 60 tablet 2    metFORMIN (GLUCOPHAGE) 500 MG tablet TAKE 1 TABLET BY MOUTH TWICE DAILY WITH MEALS 180 tablet 3    lisinopril (PRINIVIL;ZESTRIL) 10 MG tablet TAKE 1 TABLET BY MOUTH TWICE DAILY 180 tablet 3    hydroCHLOROthiazide (MICROZIDE) 12.5 MG capsule TAKE ONE CAPSULE BY MOUTH EVERY MORNING 90 capsule 3    donepezil (ARICEPT) 10 MG tablet TAKE 1 TABLET BY MOUTH EVERY NIGHT AT BEDTIME 90 tablet 3    clopidogrel (PLAVIX) 75 MG tablet TAKE ONE TABLET BY MOUTH DAILY 90 tablet 3    aspirin 81 MG EC tablet Take 81 mg by mouth daily. Allergies  No Known Allergies   Social   Social History     Tobacco Use    Smoking status: Never Smoker    Smokeless tobacco: Never Used    Tobacco comment: has been around lots of second hand smoke (mother)   Substance Use Topics    Alcohol use: No        Family History   Problem Relation Age of Onset    Cancer Mother     Cancer Father     Diabetes Maternal Aunt     Diabetes Other            Review of Systems  Review of systems not obtained due to patient factors. Physical Exam  Blood pressure (!) 113/52, pulse 99, temperature 97.7 °F (36.5 °C), temperature source Axillary, resp. rate 16, height 5' 6\" (1.676 m), weight 128 lb (58.1 kg), SpO2 92 %. General appearance: alert, cooperative, no distress, appears stated age  Eyes: Anicteric  Head: Normocephalic, without obvious abnormality  Lungs: clear to auscultation bilaterally, Normal Effort  Heart: regular rate and rhythm, normal S1 and S2, no murmurs or rubs  Abdomen: soft, non-tender. Bowel sounds normal. No masses,  no organomegaly.    Extremities: atraumatic, no cyanosis or edema  Skin: warm and dry, no jaundice  Neuro: Grossly intact, A&OX3  Musculoskeletal: 5/5  strength BUE      Data Review:    Recent Labs     04/13/21  1232 04/14/21  0558   WBC 10.1 10.4   HGB 9.5* 8.7*   HCT 30.2* 28.3*   MCV 94.6 95.1    261     Recent Labs     04/13/21  1232 04/14/21  0558    142   K 4.4 3.8    108   CO2 25 20*   BUN 22* 19   CREATININE 0.8 0.7     Recent Labs     04/13/21  1232 04/14/21  0558   AST 32 22   ALT 18 16   BILITOT 0.3 0.3   ALKPHOS 94 88     No results for input(s): LIPASE, AMYLASE in the last 72 hours. Recent Labs     04/13/21  1232   PROTIME 13.1   INR 1.13     No results for input(s): PTT in the last 72 hours. No results for input(s): OCCULTBLD in the last 72 hours. Imaging Studies:                                    CT-scan of  Pelvis wo contrast 4/13/21:               Impression:     1. No evidence of acute pelvic or hip fracture.  Diffuse osteopenia. Degenerative changes in the lower lumbar spine, SI joints and pubic symphysis. 2. Probable colonic diverticulum adjacent to the cecum with circumferential   wall thickening.  Findings may be related to a chronic infectious or   inflammatory process although neoplasm is a possibility.  Colonic   diverticulosis throughout the remainder of the visualized colon with no acute   features. Assessment:     Principal Problem:    Late onset Alzheimer's disease without behavioral disturbance ([address]  Active Problems:    Diabetes mellitus type 2, controlled ([address]    Pure hypercholesterolemia    Diabetic neuropathy ([address]    Hypertension    Failure to thrive in adult    Diverticulitis large intestine  Resolved Problems:    * No resolved hospital problems. *    Abnormal CT of the pelvis -  CT with incidental finding of probable diverticulum adjacent to the cecum with circumferential wall thickening which could be from inflammatory process vs neoplasm. No abdominal pain or tenderness. 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 Recommendations:   - discussed CT findings with patient and her daughter, Shiva Melendez. Pt declines colonoscopy to f/u the CT findings. Her daughter is in agreement. Discussed with Dr. Nazario Martin PA-C  [facility]      I have personally performed a face to face diagnostic evaluation on this patient. I have interviewed and examined the patient and I agree with the findings and recommended plan of care. In summary, my findings and plan are the following:  Abnormal ct showing some abnormality near cecum. She does have  A known lipoma in that area. Pt denies ab pain in that region and is nontender. Pt not interested in colonoscopic eval and understands r/b/a including undiagnosed lesions. Will sign off.        Wilder Wan MD  [address]

## 2021-04-14 NOTE — PROGRESS NOTES
4 Eyes Skin Assessment     NAME:  Daron Silverio  YOB: 1931  MEDICAL RECORD NUMBER:  [id number]    The patient is being assess for  Admission     I agree that 2 RN's have performed a thorough Head to Toe Skin Assessment on the patient. ALL assessment sites listed below have been assessed. Areas assessed by both nurses:    Head, Face, Ears, Shoulders, Back, Chest, Arms, Elbows, Hands, Sacrum. Buttock, Coccyx, Ischium and Legs. Feet and Heels        Does the Patient have a Wound? Yes wound(s) were present on assessment.  LDA wound assessment was Initiated and completed        Lokesh Prevention initiated:  Yes   Wound Care Orders initiated:  No    Pressure Injury (Stage 3,4, Unstageable, DTI, NWPT, and Complex wounds) if present place consult order under [de-identified] Yes    New and Established Ostomies if present place consult order under : No      Nurse 1 eSignature: Electronically signed by Sarah Nguyen RN on 4/14/21 at 3:29 AM EDT    **SHARE this note so that the co-signing nurse is able to place an eSignature**    Nurse 2 eSignature: Electronically signed by Chata Jimenes RN on 4/14/21 at 8:25 AM EDT

## 2021-04-14 NOTE — PROGRESS NOTES
Physical Therapy    Facility/Department: [address] ORTHO/NEURO NURSING  Initial Assessment    NAME: Melly Alberto  : 1931  MRN: [id number]    Date of Service: 2021    Discharge Recommendations:  Melly Alberto scored a 6/24 on the AM-PAC short mobility form. Current research shows that an AM-PAC score of 17 or less is typically not associated with a discharge to the patient's home setting. Based on the patient's AM-PAC score and their current functional mobility deficits, it is recommended that the patient have 3-5 sessions per week of Physical Therapy at d/c to increase the patient's independence. Please see assessment section for further patient specific details. If patient discharges prior to next session this note will serve as a discharge summary. Please see below for the latest assessment towards goals. 3-5 sessions per week   PT Equipment Recommendations  Equipment Needed: No  Other: defer to next level of care    Assessment   Body structures, Functions, Activity limitations: Decreased functional mobility ; Decreased endurance;Decreased ADL status; Decreased strength;Decreased balance; Increased pain  Assessment: Pt currently dependent (max A of 2) for functional mobility due to weakness and pain. Pt pushes posteriorly when standing. Unsafe to discharge home. Continued skilled PT to promote improved mobility. Treatment Diagnosis: debility  Prognosis: Fair  Decision Making: Medium Complexity  PT Education: Goals;PT Role;Plan of Care  Patient Education: needs reinforcement of education  Barriers to Learning: cognition  REQUIRES PT FOLLOW UP: Yes  Activity Tolerance  Activity Tolerance: Patient limited by pain  Activity Tolerance: Generalized stiffness/pain/weakness limited mobility       Patient Diagnosis(es): The primary encounter diagnosis was Frequent falls. A diagnosis of Elevated troponin was also pertinent to this visit.      has a past medical history of Anxiety, Diabetic neuropathy ([facility] [address] Dysphagia, Gait disorder, Hyperlipidemia, Hypertension, Meningioma ([facility] [address] Poor memory, TIA (transient ischemic attack), and Type II or unspecified type diabetes mellitus without mention of complication, not stated as uncontrolled. has a past surgical history that includes Cholecystectomy; hysteroscopy; lipoma resection; and Colonoscopy. Restrictions  Restrictions/Precautions  Restrictions/Precautions: Fall Risk, Up as Tolerated, Modified Diet(High fall risk, carb control, up as tolerated)  Position Activity Restriction  Other position/activity restrictions: Melly Alberto is a 80 y.o. female who presents to the emergency department today with her daughter for evaluation for frequent falls. The daughter states that the patient lives at home, she states that she does live across the street. The daughter states that she is with her 6 to 8 hours/day, but essentially the patient lives alone. The daughter states that over the past 3 days the patient has had increasing fatigue, and she states that she has had multiple falls. Vision/Hearing  Vision: Impaired  Vision Exceptions: Wears glasses for reading  Hearing: Exceptions to [facility]  Hearing Exceptions: Hard of hearing/hearing concerns; No hearing aid     Subjective  General  Chart Reviewed: Yes  Family / Caregiver Present: No  Diagnosis: failure to thrive  General Comment  Comments: supine in bed at arrival  Subjective  Subjective: agreed to evaluation, poor historian, reporting right hip pain (nsg aware)  Pain Screening  Patient Currently in Pain: Yes          Orientation  Orientation  Overall Orientation Status: Impaired  Orientation Level: Oriented to place;Oriented to time;Oriented to person;Disoriented to situation  Social/Functional History  Social/Functional History  Lives With: Alone  Type of Home: House  Home Layout: One level  Home Access: Stairs to enter with rails  Entrance Stairs - Number of Steps: 5-6 MERLY  Entrance Stairs - Rails: Both  Bathroom Shower/Tub: Tub/Shower unit, Shower chair with back  Bathroom Toilet: Standard  Bathroom Equipment: Shower chair  Home Equipment: 4 wheeled walker, Cane, [address] chair  ADL Assistance: Needs assistance  Homemaking Assistance: Needs assistance(daughter assists with all I ADLS)  Ambulation Assistance: Needs assistance(4WW stays in chair unless daughter there)  Transfer Assistance: Needs assistance  Active : No  Additional Comments: Multiple falls recently. Unable to recall amount  Cognition        Objective     Observation/Palpation  Posture: Poor    AROM RLE (degrees)  RLE General AROM: limited by weakness and pain  AROM LLE (degrees)  LLE General AROM: limited by weakness and pain  Strength RLE  Comment: 2+ to 3-/5 grossly  Strength LLE  Comment: 2+ to 3-/5 grossly        Bed mobility  Rolling to Left: Minimal assistance  Supine to Sit: Maximum assistance  Scooting: Moderate assistance  Comment: HOB slightly elevated and use of bed railing  Transfers  Sit to Stand: 2 Person Assistance(1st attempt dependent of 1 with pt pushing posteriorly and legs sliding out in front of pt, max A of 2 from EOB and chair to STEDY)  Stand to sit: 2 Person Assistance(max A of 2)  Bed to Chair: Dependent/Total(with STEDY)  Ambulation  Ambulation?: No  Stairs/Curb  Stairs?: No     Balance  Posture: Poor  Sitting - Static: Fair  Sitting - Dynamic: Poor  Standing - Static: Poor  Standing - Dynamic: Poor        Plan   Plan  Times per week: 3-5x/wk  Current Treatment Recommendations: Strengthening, Balance Training, Functional Mobility Training, Endurance Training, Transfer Training  Safety Devices  Type of devices:  All fall risk precautions in place, Call light within reach, Chair alarm in place, Nurse notified, Left in chair, Telesitter in use    G-Code       OutComes Score                                                  AM-PAC Score  AM-PAC Inpatient Mobility Raw Score : 6 (04/14/21 0951)  AM-PAC Inpatient T-Scale Score : 23.55 (04/14/21 0951)  Mobility Inpatient CMS 0-100% Score: 100 (04/14/21 0951)  Mobility Inpatient CMS G-Code Modifier : CN (04/14/21 0951)          Goals  Short term goals  Time Frame for Short term goals: to be met by discharge  Short term goal 1: pt able to perform bed mobility with minimal assistance  Short term goal 2: pt able to perform STS transfers with moderate assistance  Short term goal 3: pt able to perform bed to chair transfer with max A of 1  Patient Goals   Patient goals : did not state       Therapy Time   Individual Concurrent Group Co-treatment   Time In 0916         Time Out 0945         Minutes 29         Timed Code Treatment Minutes: [address], [id number]

## 2021-04-14 NOTE — PROGRESS NOTES
7882: Shift assessment complete. VSS. Alert to person, place, and situation. Disoriented to time. See flowsheets. Morning medications given per MAR. The care plan and education has been reviewed and mutually agreed upon with the patient. Pt needs expressed, call light within reach, will continue to monitor.

## 2021-04-14 NOTE — PROGRESS NOTES
Assessment complete. Alert. Oriented to person, place, and situation. Disoriented to time. Skin assessment reveals multiple wounds to groin, labia, buttocks, and R hip; see LDA. Incontinent of urine; cleaned, changed, repositioned. As patient has unstageable PI to R hip, they were turned onto left side with pillow support. Daughter Jazmin Dixon) at bedside, assisted with admission. The care plan and education has been reviewed and mutually agreed upon with the patient, reinforcement necessary as patient was admitted with late onset dementia. Oriented to room and use of call light, verbalized understanding. In bed, bed alarm on, bed in lowest position, call light and bedside table within reach. No further needs expressed at this time. 0007  It was noted that patient's BG was 52 at 2232. BG rechecked at 50. Two tubes of Glutose gel given per MAR. Will recheck BG.    0029  At recheck, BG 82.     0441  At recheck, .

## 2021-04-14 NOTE — PLAN OF CARE
Problem: Falls - Risk of:  Goal: Will remain free from falls  Description: Will remain free from falls  4/14/2021 0956 by Allison Damon RN  Outcome: Ongoing  4/14/2021 0610 by Dorys Bravo RN  Outcome: Ongoing  Goal: Absence of physical injury  Description: Absence of physical injury  4/14/2021 0956 by Allison Damon RN  Outcome: Ongoing  4/14/2021 0610 by Dorys Bravo RN  Outcome: Ongoing     Problem: Skin Integrity:  Goal: Will show no infection signs and symptoms  Description: Will show no infection signs and symptoms  4/14/2021 0956 by Allison Damon RN  Outcome: Ongoing  4/14/2021 0610 by Dorys Bravo RN  Outcome: Ongoing  Goal: Absence of new skin breakdown  Description: Absence of new skin breakdown  4/14/2021 0956 by Allison Damon RN  Outcome: Ongoing  4/14/2021 0610 by Dorys Bravo RN  Outcome: Ongoing

## 2021-04-14 NOTE — PROGRESS NOTES
Progress Note - Dr. Teddy Red - Internal Medicine  PCP: MD Verito Mcnamara / Karan Cormier [phone]    Hospital Day: 1  Code Status: Full Code  Current Diet: DIET CARB CONTROL;        CC: follow up on medical issues    Subjective:   Estela Najjar is a 80 y.o. female. She denies problems    Doing ok  No new issues  Pt not entirely sure she is in hospital    Awaiting therapy evals    She denies chest pain, denies shortness of breath, denies nausea,  denies emesis. 10 system Review of Systems is reviewed with patient, and pertinent positives are noted in HPI above . Otherwise, Review of systems is negative. I have reviewed the patient's medical and social history in detail and updated the computerized patient record. To recap: She  has a past medical history of Anxiety, Diabetic neuropathy ([facility] [address] Dysphagia, Gait disorder, Hyperlipidemia, Hypertension, Meningioma ([facility] [address] Poor memory, TIA (transient ischemic attack), and Type II or unspecified type diabetes mellitus without mention of complication, not stated as uncontrolled. . She  has a past surgical history that includes Cholecystectomy; hysteroscopy; lipoma resection; and Colonoscopy. . She  reports that she has never smoked. She has never used smokeless tobacco. She reports that she does not drink alcohol or use drugs. .        Active Hospital Problems    Diagnosis Date Noted    Diverticulitis large intestine [K57.32] 04/14/2021    Failure to thrive in adult [R62.7] 04/13/2021    Late onset Alzheimer's disease without behavioral disturbance ([address] [G30.1, F02.80] 07/23/2020    Hypertension [I10]     Diabetic neuropathy ([address] [E11.40] 05/31/2012    Pure hypercholesterolemia [E78.00] 08/04/2011    Diabetes mellitus type 2, controlled ([facility] [address] [E11.9] 08/03/2011       Current Facility-Administered Medications: ciprofloxacin (CIPRO) IVPB 400 mg, 400 mg, Intravenous, Q12H  metronidazole (FLAGYL) 500 mg in NaCl 100 mL IVPB premix, 500 mg, Intravenous, Q8H  0.9 % sodium chloride infusion, , Intravenous, Continuous  sodium chloride flush 0.9 % injection 5-40 mL, 5-40 mL, Intravenous, 2 times per day  sodium chloride flush 0.9 % injection 5-40 mL, 5-40 mL, Intravenous, PRN  0.9 % sodium chloride infusion, 25 mL, Intravenous, PRN  potassium chloride (KLOR-CON M) extended release tablet 40 mEq, 40 mEq, Oral, PRN **OR** potassium bicarb-citric acid (EFFER-K) effervescent tablet 40 mEq, 40 mEq, Oral, PRN **OR** potassium chloride 10 mEq/100 mL IVPB (Peripheral Line), 10 mEq, Intravenous, PRN  enoxaparin (LOVENOX) injection 40 mg, 40 mg, Subcutaneous, Daily  promethazine (PHENERGAN) tablet 12.5 mg, 12.5 mg, Oral, Q6H PRN **OR** ondansetron (ZOFRAN) injection 4 mg, 4 mg, Intravenous, Q6H PRN  magnesium hydroxide (MILK OF MAGNESIA) 400 MG/5ML suspension 30 mL, 30 mL, Oral, Daily PRN  famotidine (PEPCID) tablet 20 mg, 20 mg, Oral, Daily PRN  acetaminophen (TYLENOL) tablet 650 mg, 650 mg, Oral, Q6H PRN **OR** acetaminophen (TYLENOL) suppository 650 mg, 650 mg, Rectal, Q6H PRN  hydrALAZINE (APRESOLINE) injection 10 mg, 10 mg, Intravenous, Q6H PRN  0.9 % sodium chloride bolus, 500 mL, Intravenous, PRN  insulin lispro (1 Unit Dial) 0-12 Units, 0-12 Units, Subcutaneous, TID WC  insulin lispro (1 Unit Dial) 0-6 Units, 0-6 Units, Subcutaneous, Nightly  glucose (GLUTOSE) 40 % oral gel 15 g, 15 g, Oral, PRN  dextrose 50 % IV solution, 12.5 g, Intravenous, PRN  glucagon (rDNA) injection 1 mg, 1 mg, Intramuscular, PRN  dextrose 5 % solution, 100 mL/hr, Intravenous, PRN         Objective:  /74   Pulse 99   Temp 98.4 °F (36.9 °C) (Oral)   Resp 14   Ht 5' 6\" (1.676 m)   Wt 128 lb (58.1 kg)   SpO2 99%   BMI 20.66 kg/m²      Patient Vitals for the past 24 hrs:   BP Temp Temp src Pulse Resp SpO2 Height Weight   04/14/21 0400 137/74 98.4 °F (36.9 °C) Oral 99 14 99 % -- --   04/13/21 2130 (!) 93/59 98.3 °F (36.8 °C) Oral 66 16 95 % -- --   04/13/21 1158 -- -- -- -- -- -- 5' 6\" (1.676 m) 128 lb (58.1 kg)   04/13/21 1157 111/74 98.4 °F (36.9 °C) Oral 96 16 98 % -- --     Patient Vitals for the past 96 hrs (Last 3 readings):   Weight   04/13/21 1158 128 lb (58.1 kg)           Intake/Output Summary (Last 24 hours) at 4/14/2021 0816  Last data filed at 4/14/2021 0029  Gross per 24 hour   Intake 240 ml   Output --   Net 240 ml         Physical Exam:   /74   Pulse 99   Temp 98.4 °F (36.9 °C) (Oral)   Resp 14   Ht 5' 6\" (1.676 m)   Wt 128 lb (58.1 kg)   SpO2 99%   BMI 20.66 kg/m²   General appearance: alert, appears stated age and cooperative  Head: Normocephalic, without obvious abnormality, atraumatic  Lungs: clear to auscultation bilaterally  Heart: regular rate and rhythm, S1, S2 normal, no murmur, click, rub or gallop  Abdomen: soft, non-tender; bowel sounds normal; no masses,  no organomegaly  Extremities: extremities normal, atraumatic, no cyanosis or edema    Labs:  Lab Results   Component Value Date    WBC 10.4 04/14/2021    HGB 8.7 (L) 04/14/2021    HCT 28.3 (L) 04/14/2021     04/13/2021    CHOL 127 07/30/2020    TRIG 107 07/30/2020    HDL 40 07/30/2020    ALT 16 04/14/2021    AST 22 04/14/2021     04/14/2021    K 3.8 04/14/2021     04/14/2021    CREATININE 0.7 04/14/2021    BUN 19 04/14/2021    CO2 20 (L) 04/14/2021    TSH 0.63 08/01/2018    INR 1.13 04/13/2021    GLUF 77 01/04/2021    LABA1C 6.1 04/05/2021    LABMICR YES 04/13/2021     Lab Results   Component Value Date    TROPONINI 0.02 (H) 04/13/2021       Recent Imaging Results are Reviewed:  Xr Hip Bilateral W Ap Pelvis (2 Views)    Result Date: 4/13/2021  EXAMINATION: ONE XRAY VIEW OF THE PELVIS AND TWO XRAY VIEWS OF EACH OF THE BILATERAL HIPS 4/13/2021 1:19 pm COMPARISON: None.  HISTORY: ORDERING SYSTEM PROVIDED HISTORY: fall TECHNOLOGIST PROVIDED HISTORY: Reason for exam:->fall Reason for Exam: Fall (pt family member states pt with multiple recent falls- states lives by herself, but daughter lives close by and helps her- daughter states she is unable to take care of her full time would possibly like nursing home placement) Acuity: Acute Type of Exam: Initial FINDINGS: Evaluation of the pelvis limited due to limitations in patient positioning. Limited evaluation of the pubic symphysis and sacrum. Question a nondisplaced fracture of the right inferior pubic ramus. No widening of the SI joints bilaterally. Subchondral sclerosis with marginal osteophytes seen in the SI joints bilaterally No evidence of a proximal femoral fracture dislocation. Vascular calcifications are noted. Degenerative changes seen in the lower lumbar spine     Limited exam. Question a fracture involving the right inferior pubic rami. Consider CT of the pelvis as this exam is limited for detecting fractures. Ct Head Wo Contrast    Result Date: 4/13/2021  EXAMINATION: CT OF THE HEAD WITHOUT CONTRAST  4/13/2021 12:48 pm TECHNIQUE: CT of the head was performed without the administration of intravenous contrast. Dose modulation, iterative reconstruction, and/or weight based adjustment of the mA/kV was utilized to reduce the radiation dose to as low as reasonably achievable. COMPARISON: 11/05/2012 HISTORY: ORDERING SYSTEM PROVIDED HISTORY: fall TECHNOLOGIST PROVIDED HISTORY: Reason for exam:->fall Has a \"code stroke\" or \"stroke alert\" been called? ->No Decision Support Exception->Emergency Medical Condition (MA) Reason for Exam: fall Acuity: Acute Type of Exam: Initial FINDINGS: BRAIN/VENTRICLES: There is no acute intracranial hemorrhage, mass effect or midline shift. No abnormal extra-axial fluid collection. The gray-white differentiation is maintained without evidence of an acute infarct. There is prominence of the ventricles and sulci due to global parenchymal volume loss.  There are nonspecific areas of hypoattenuation within the periventricular and subcortical white matter, which likely represent chronic microvascular ischemic change. ORBITS: The visualized portion of the orbits demonstrate no acute abnormality. SINUSES: The visualized paranasal sinuses and mastoid air cells demonstrate no acute abnormality. SOFT TISSUES/SKULL: No acute abnormality of the visualized skull or soft tissues. No acute intracranial abnormality. Moderate cerebral atrophy appropriate for age. Moderate to large amount of chronic ischemic change also appropriate for age. No significant change from the prior study. Ct Cervical Spine Wo Contrast    Result Date: 4/13/2021  EXAMINATION: CT OF THE CERVICAL SPINE WITHOUT CONTRAST 4/13/2021 12:49 pm TECHNIQUE: CT of the cervical spine was performed without the administration of intravenous contrast. Multiplanar reformatted images are provided for review. Dose modulation, iterative reconstruction, and/or weight based adjustment of the mA/kV was utilized to reduce the radiation dose to as low as reasonably achievable. COMPARISON: None. HISTORY: ORDERING SYSTEM PROVIDED HISTORY: fall TECHNOLOGIST PROVIDED HISTORY: Reason for exam:->fall Decision Support Exception->Emergency Medical Condition (MA) Reason for Exam: fall Acuity: Acute Type of Exam: Initial FINDINGS: BONES/ALIGNMENT: There is no acute fracture or traumatic malalignment. DEGENERATIVE CHANGES: No significant degenerative changes considering patient's age. There is narrowing and mild spurring C6-7. SOFT TISSUES: There is no prevertebral soft tissue swelling. No acute abnormality of the cervical spine. Ct Pelvis Wo Contrast Additional Contrast? None    Result Date: 4/13/2021  EXAMINATION: CT OF THE PELVIS WITHOUT CONTRAST 4/13/2021 3:58 pm TECHNIQUE: CT of the pelvis was performed without the administration of intravenous contrast.  Multiplanar reformatted images are provided for review.  Dose modulation, iterative reconstruction, and/or weight based adjustment of the mA/kV was utilized to reduce the radiation dose to as low as reasonably achievable. COMPARISON: Plain films performed earlier today. HISTORY: ORDERING SYSTEM PROVIDED HISTORY: r/o hip fx and rami fx TECHNOLOGIST PROVIDED HISTORY: Reason for exam:->r/o hip fx and rami fx Additional Contrast?->None Decision Support Exception->Emergency Medical Condition (MA) Reason for Exam: r/o hip fx and rami fx. Acuity: Acute Type of Exam: Initial FINDINGS: The bones are diffusely osteopenic. There is no evidence of acute fracture of the pelvis or either hip. Mild symmetric osteoarthritic changes of the SI joints. Moderate degenerative change at the pubic symphysis. There is minimal symmetric osteoarthritic changes of the hips. Degenerative disc disease at L4-5 and L5-S1 with lower lumbar facet arthropathy. Grade 1 anterolisthesis of L4 on L5 and L5 on S1 is noted. There is mild edematous changes in the subcutaneous fat with no focal subcutaneous abnormality. Within the pelvis, colonic diverticulosis is noted in the sigmoid colon. There is a probable diverticulum in the cecal region with marked circumferential wall thickening measuring up to 1.3 cm. Cystic area in the left adnexa measuring 1.8 cm. No free pelvic fluid. Partial distention of the urinary bladder. Multiple calcified pelvic phleboliths. Iliac and femoral vascular calcification. 1. No evidence of acute pelvic or hip fracture. Diffuse osteopenia. Degenerative changes in the lower lumbar spine, SI joints and pubic symphysis. 2. Probable colonic diverticulum adjacent to the cecum with circumferential wall thickening. Findings may be related to a chronic infectious or inflammatory process although neoplasm is a possibility. Colonic diverticulosis throughout the remainder of the visualized colon with no acute features. Xr Chest Portable    Result Date: 4/13/2021  EXAMINATION: ONE XRAY VIEW OF THE CHEST 4/13/2021 1:19 pm COMPARISON: None.  HISTORY: ORDERING SYSTEM PROVIDED HISTORY: SOB TECHNOLOGIST PROVIDED HISTORY: Reason for exam:->SOB Reason for Exam: Fall (pt family member states pt with multiple recent falls- states lives by herself, but daughter lives close by and helps her- daughter states she is unable to take care of her full time would possibly like nursing home placement) Acuity: Acute Type of Exam: Initial FINDINGS: The cardiomediastinal silhouette is borderline in size. The lungs are clear. No infiltrate, pleural fluid or evidence of overt failure. Osseous structures are intact     No acute cardiopulmonary disease. Assessment and Plan:  Principal Problem:    Late onset Alzheimer's disease without behavioral disturbance ([address] -Established problem. Stable. Plan: pt/ot arlene requested  Active Problems:    Diabetes mellitus type 2, controlled ([address] -Established problem. Stable. Plan: cont ccc diet, sliding scale, home meds    Pure hypercholesterolemia -Established problem. Stable. Plan: Continue present orders/plan. Diabetic neuropathy ([address] -Established problem. Stable. Hypertension -Established problem. Stable. 137/74  Plan: Pt home BP meds reviewed and will be continued. IV Hydralazine ordered for control of extremely high blood pressures   Will monitor labs to assess Creat/K for possible complications of medications. Failure to thrive in adult -Established problem. Stable. Plan: Continue present orders/plan. Diverticulitis large intestine -Established problem. Stable.     Plan: cont abx, gi asked to arlene Tripathi  4/14/2021

## 2021-04-15 LAB
ANION GAP SERPL CALCULATED.3IONS-SCNC: 7 MMOL/L (ref 3–16)
BASOPHILS ABSOLUTE: 0 K/UL (ref 0–0.2)
BASOPHILS RELATIVE PERCENT: 0.6 %
BUN BLDV-MCNC: 13 MG/DL (ref 7–20)
CALCIUM SERPL-MCNC: 8.9 MG/DL (ref 8.3–10.6)
CHLORIDE BLD-SCNC: 111 MMOL/L (ref 99–110)
CO2: 25 MMOL/L (ref 21–32)
CREAT SERPL-MCNC: 0.8 MG/DL (ref 0.6–1.2)
EOSINOPHILS ABSOLUTE: 0.1 K/UL (ref 0–0.6)
EOSINOPHILS RELATIVE PERCENT: 1.6 %
GFR AFRICAN AMERICAN: >60
GFR NON-AFRICAN AMERICAN: >60
GLUCOSE BLD-MCNC: 103 MG/DL (ref 70–99)
GLUCOSE BLD-MCNC: 125 MG/DL (ref 70–99)
GLUCOSE BLD-MCNC: 79 MG/DL (ref 70–99)
GLUCOSE BLD-MCNC: 86 MG/DL (ref 70–99)
GLUCOSE BLD-MCNC: 87 MG/DL (ref 70–99)
HCT VFR BLD CALC: 24.4 % (ref 36–48)
HEMOGLOBIN: 7.6 G/DL (ref 12–16)
LACTIC ACID: 0.7 MMOL/L (ref 0.4–2)
LYMPHOCYTES ABSOLUTE: 0.8 K/UL (ref 1–5.1)
LYMPHOCYTES RELATIVE PERCENT: 14.9 %
MCH RBC QN AUTO: 29.2 PG (ref 26–34)
MCHC RBC AUTO-ENTMCNC: 31.1 G/DL (ref 31–36)
MCV RBC AUTO: 93.8 FL (ref 80–100)
MONOCYTES ABSOLUTE: 0.7 K/UL (ref 0–1.3)
MONOCYTES RELATIVE PERCENT: 12.8 %
NEUTROPHILS ABSOLUTE: 3.9 K/UL (ref 1.7–7.7)
NEUTROPHILS RELATIVE PERCENT: 70.1 %
ORGANISM: ABNORMAL
PDW BLD-RTO: 15.3 % (ref 12.4–15.4)
PERFORMED ON: ABNORMAL
PERFORMED ON: ABNORMAL
PERFORMED ON: NORMAL
PERFORMED ON: NORMAL
PLATELET # BLD: 256 K/UL (ref 135–450)
PMV BLD AUTO: 7.6 FL (ref 5–10.5)
POTASSIUM SERPL-SCNC: 3.7 MMOL/L (ref 3.5–5.1)
RBC # BLD: 2.6 M/UL (ref 4–5.2)
SODIUM BLD-SCNC: 143 MMOL/L (ref 136–145)
URINE CULTURE, ROUTINE: ABNORMAL
WBC # BLD: 5.5 K/UL (ref 4–11)

## 2021-04-15 PROCEDURE — 85025 COMPLETE CBC W/AUTO DIFF WBC: CPT

## 2021-04-15 PROCEDURE — 83605 ASSAY OF LACTIC ACID: CPT

## 2021-04-15 PROCEDURE — 36415 COLL VENOUS BLD VENIPUNCTURE: CPT

## 2021-04-15 PROCEDURE — 96366 THER/PROPH/DIAG IV INF ADDON: CPT

## 2021-04-15 PROCEDURE — 1200000000 HC SEMI PRIVATE

## 2021-04-15 PROCEDURE — 2500000003 HC RX 250 WO HCPCS: Performed by: INTERNAL MEDICINE

## 2021-04-15 PROCEDURE — 96372 THER/PROPH/DIAG INJ SC/IM: CPT

## 2021-04-15 PROCEDURE — 2580000003 HC RX 258: Performed by: INTERNAL MEDICINE

## 2021-04-15 PROCEDURE — 80048 BASIC METABOLIC PNL TOTAL CA: CPT

## 2021-04-15 PROCEDURE — 6360000002 HC RX W HCPCS: Performed by: INTERNAL MEDICINE

## 2021-04-15 PROCEDURE — G0378 HOSPITAL OBSERVATION PER HR: HCPCS

## 2021-04-15 RX ADMIN — METRONIDAZOLE 500 MG: 500 INJECTION, SOLUTION INTRAVENOUS at 00:45

## 2021-04-15 RX ADMIN — CIPROFLOXACIN 400 MG: 2 INJECTION, SOLUTION INTRAVENOUS at 08:58

## 2021-04-15 RX ADMIN — CIPROFLOXACIN 400 MG: 2 INJECTION, SOLUTION INTRAVENOUS at 20:26

## 2021-04-15 RX ADMIN — ENOXAPARIN SODIUM 40 MG: 40 INJECTION SUBCUTANEOUS at 09:02

## 2021-04-15 RX ADMIN — SODIUM CHLORIDE: 9 INJECTION, SOLUTION INTRAVENOUS at 07:21

## 2021-04-15 RX ADMIN — METRONIDAZOLE 500 MG: 500 INJECTION, SOLUTION INTRAVENOUS at 10:04

## 2021-04-15 RX ADMIN — METRONIDAZOLE 500 MG: 500 INJECTION, SOLUTION INTRAVENOUS at 15:59

## 2021-04-15 NOTE — PLAN OF CARE
Problem: Falls - Risk of:  Goal: Will remain free from falls  Description: Will remain free from falls  4/14/2021 2238 by Ria Nur RN  Outcome: Ongoing     Problem: Falls - Risk of:  Goal: Absence of physical injury  Description: Absence of physical injury  4/14/2021 2238 by Ria Nur RN  Outcome: Ongoing     Problem: Skin Integrity:  Goal: Will show no infection signs and symptoms  Description: Will show no infection signs and symptoms  4/14/2021 2238 by Ria Nur RN  Outcome: Ongoing     Problem: Skin Integrity:  Goal: Absence of new skin breakdown  Description: Absence of new skin breakdown  4/14/2021 2238 by Ria Nur RN  Outcome: Ongoing     Problem: Pain:  Goal: Pain level will decrease  Description: Pain level will decrease  Outcome: Ongoing     Problem: Pain:  Goal: Control of acute pain  Description: Control of acute pain  Outcome: Ongoing     Problem: Pain:  Goal: Control of chronic pain  Description: Control of chronic pain  Outcome: Ongoing     Problem: Nutrition  Goal: Optimal nutrition therapy  Outcome: Ongoing

## 2021-04-15 NOTE — DISCHARGE SUMMARY
[facility] -- Physician Discharge Summary     Catherine Chavira  1/16/1931  MRN: [id number]    Admit Date: 4/13/2021  Discharge Date: No discharge date for patient encounter. Attending MD: Merced Ibarra MD  Discharging MD: Merced Ibarra MD  PCP: MD Carolin Joel. Dimityr More  / [place] [address] [phone]    Admission Diagnosis: Failure to thrive in adult [R62.7]  DISCHARGE DIAGNOSIS: same    Full Hospital Problem List:  [facility] Problems    Diagnosis Date Noted    Diverticulitis large intestine [K57.32] 04/14/2021    Moderate malnutrition ([address] [E44.0] 04/14/2021    Failure to thrive in adult [R62.7] 04/13/2021    Late onset Alzheimer's disease without behavioral disturbance ([address] [G30.1, F02.80] 07/23/2020    Hypertension [I10]     Diabetic neuropathy ([facility] [address] [E11.40] 05/31/2012    Pure hypercholesterolemia [E78.00] 08/04/2011    Diabetes mellitus type 2, controlled ([address] [E11.9] 08/03/2011           Hospital Course:  80 y. o. female who presents to the emergency department today with her daughter for evaluation for frequent falls.  The daughter states that the patient lives at home, she states that she does live across the street.  The daughter states that she is with her 6 to 8 hours/day, but essentially the patient lives alone. Marcella Peters daughter states that over the past 3 days the patient has had increasing fatigue, and she states that she has had multiple falls.  The daughter states that she went yesterday and today to try to help the patient, and she states that the patient continues to have increasing fatigue, and she states that the patient actually fell again today while try to get her into the car when the patient arrives to the ED she is complaining of pain to her right hip. Nilesh Tomas de Castro is alert and oriented x2.  The daughter does not believe that the patient has hit her head at any point she states that \"I think that she just tries to get up, she is too weak, and so she just lies off the couch\".  The patient otherwise seems to be acting at baseline.  The patient denies any chest pain or shortness of breath. Mitchel Hook has been no reports of any fever or cough.  Patient has no abdominal pain. Mitchel Hook is been no nausea, vomiting or diarrhea.  No dysuria or hematuria.  The daughter states that she is unable to take care of her at this time, and she is thinking about placing her in a nursing home.     Hard to get any other hx from patient     CT Head from ER reviewed       Impression:         No acute intracranial abnormality. Moderate cerebral atrophy appropriate for age. Nanette Rucks to large amount of   chronic ischemic change also appropriate for age. Ovidio Chaviras significant change from   the prior study. There is incidental finding of diverticulitis on CT. Family would like GIconsulted, but after consult \"Pt not interested in colonoscopic eval and understands r/b/a including undiagnosed lesions. \" so GI signs off    Pt is treated with iv abx - cipro/flagyl - and she will be transitioned to oral abx on d/c    Pt/ot both see patient and they recommend SNF on discharge      Consults made during Hospitalization:  [facility] TO GI    Treatment team at time of Discharge: Treatment Team: Attending Provider: Virginia Martinez MD; Consulting Physician: Virginia Martinez MD; Registered Nurse: Rama Cleveland RN; Patient Care Tech: Re Escobar; Utilization Reviewer: Jacquelyn Castro RN    Imaging Results:  Xr Hip Bilateral W Ap Pelvis (2 Views)    Result Date: 4/13/2021  EXAMINATION: ONE XRAY VIEW OF THE PELVIS AND TWO XRAY VIEWS OF EACH OF THE BILATERAL HIPS 4/13/2021 1:19 pm COMPARISON: None.  HISTORY: ORDERING SYSTEM PROVIDED HISTORY: fall TECHNOLOGIST PROVIDED HISTORY: Reason for exam:->fall Reason for Exam: Fall (pt family member states pt with multiple recent falls- states lives by herself, but daughter lives close by and helps her- daughter states she is unable to take care of her full time would possibly like nursing home placement) Acuity: Acute Type of Exam: Initial FINDINGS: Evaluation of the pelvis limited due to limitations in patient positioning. Limited evaluation of the pubic symphysis and sacrum. Question a nondisplaced fracture of the right inferior pubic ramus. No widening of the SI joints bilaterally. Subchondral sclerosis with marginal osteophytes seen in the SI joints bilaterally No evidence of a proximal femoral fracture dislocation. Vascular calcifications are noted. Degenerative changes seen in the lower lumbar spine     Limited exam. Question a fracture involving the right inferior pubic rami. Consider CT of the pelvis as this exam is limited for detecting fractures. Ct Head Wo Contrast    Result Date: 4/13/2021  EXAMINATION: CT OF THE HEAD WITHOUT CONTRAST  4/13/2021 12:48 pm TECHNIQUE: CT of the head was performed without the administration of intravenous contrast. Dose modulation, iterative reconstruction, and/or weight based adjustment of the mA/kV was utilized to reduce the radiation dose to as low as reasonably achievable. COMPARISON: 11/05/2012 HISTORY: ORDERING SYSTEM PROVIDED HISTORY: fall TECHNOLOGIST PROVIDED HISTORY: Reason for exam:->fall Has a \"code stroke\" or \"stroke alert\" been called? ->No Decision Support Exception->Emergency Medical Condition (MA) Reason for Exam: fall Acuity: Acute Type of Exam: Initial FINDINGS: BRAIN/VENTRICLES: There is no acute intracranial hemorrhage, mass effect or midline shift. No abnormal extra-axial fluid collection. The gray-white differentiation is maintained without evidence of an acute infarct. There is prominence of the ventricles and sulci due to global parenchymal volume loss. There are nonspecific areas of hypoattenuation within the periventricular and subcortical white matter, which likely represent chronic microvascular ischemic change.  ORBITS: The visualized portion of the orbits demonstrate no acute abnormality. SINUSES: The visualized paranasal sinuses and mastoid air cells demonstrate no acute abnormality. SOFT TISSUES/SKULL: No acute abnormality of the visualized skull or soft tissues. No acute intracranial abnormality. Moderate cerebral atrophy appropriate for age. Moderate to large amount of chronic ischemic change also appropriate for age. No significant change from the prior study. Ct Cervical Spine Wo Contrast    Result Date: 4/13/2021  EXAMINATION: CT OF THE CERVICAL SPINE WITHOUT CONTRAST 4/13/2021 12:49 pm TECHNIQUE: CT of the cervical spine was performed without the administration of intravenous contrast. Multiplanar reformatted images are provided for review. Dose modulation, iterative reconstruction, and/or weight based adjustment of the mA/kV was utilized to reduce the radiation dose to as low as reasonably achievable. COMPARISON: None. HISTORY: ORDERING SYSTEM PROVIDED HISTORY: fall TECHNOLOGIST PROVIDED HISTORY: Reason for exam:->fall Decision Support Exception->Emergency Medical Condition (MA) Reason for Exam: fall Acuity: Acute Type of Exam: Initial FINDINGS: BONES/ALIGNMENT: There is no acute fracture or traumatic malalignment. DEGENERATIVE CHANGES: No significant degenerative changes considering patient's age. There is narrowing and mild spurring C6-7. SOFT TISSUES: There is no prevertebral soft tissue swelling. No acute abnormality of the cervical spine. Ct Pelvis Wo Contrast Additional Contrast? None    Result Date: 4/14/2021  EXAMINATION: CT OF THE PELVIS WITHOUT CONTRAST 4/13/2021 3:58 pm TECHNIQUE: CT of the pelvis was performed without the administration of intravenous contrast.  Multiplanar reformatted images are provided for review. Dose modulation, iterative reconstruction, and/or weight based adjustment of the mA/kV was utilized to reduce the radiation dose to as low as reasonably achievable.  COMPARISON: Plain films metFORMIN (GLUCOPHAGE) 500 MG tablet  TAKE 1 TABLET BY MOUTH TWICE DAILY WITH MEALS             metoprolol succinate (TOPROL XL) 25 MG extended release tablet  TAKE ONE TABLET BY MOUTH DAILY             pravastatin (PRAVACHOL) 40 MG tablet  TAKE ONE TABLET BY MOUTH DAILY             triamcinolone (KENALOG) 0.1 % cream  Apply topically 2 times daily                  Allergies:  No Known Allergies    Follow up Instructions: Follow-up with PCP: Alfredo Gonzalez MD in 2 wk .       Total time spent on day of discharge including face-to-face visit, examination, documentation, counseling, preparation of discharge plans and followup, and discharge medicine reconciliation and presciptions is 36 minutes    Signed:  Susy Barnes MD  4/15/2021

## 2021-04-15 NOTE — PROGRESS NOTES
Shift assessment completed. VSS. Patient alert and resting in bed quietly. Denies having any pain or discomfort at this time. The care plan and education have been reviewed and mutually agreed upon with the patient. Call light in reach. Will continue to monitor.

## 2021-04-15 NOTE — CARE COORDINATION
Received call from [facility] and [facility]-both able to accept. First choice according to daughter was [facility]. Chiquita in admissions initiated precert today.   Electronically signed by OCTAVIA Siu on 4/15/2021 at 11:51 AM

## 2021-04-15 NOTE — CARE COORDINATION
[facility] Nurse  Consult Note       NAME:  [address] [place] RECORD NUMBER:  [id number]  AGE: 80 y.o. GENDER: female  : 1931  TODAY'S DATE:  4/15/2021    Subjective   Reason for WOCN Evaluation and Assessment: wounds to labia, vagina, right thigh      Reyna Shepard is a 80 y.o. female referred by:   [x] Physician  [x] Nursing  [] Other:     Wound Identification:  Wound Type: undetermined  Contributing Factors: chronic pressure, decreased mobility, incontinence of stool, incontinence of urine and poor hygiene    Wound History: present on admission  Current Wound Care Treatment:  Zinc paste, foam dressings    Patient Goal of Care:  [x] Wound Healing  [x] Odor Control  [] Palliative Care  [] Pain Control   [] Other:         PAST MEDICAL HISTORY        Diagnosis Date    Anxiety     Diabetic neuropathy ([facility]     Dysphagia     Gait disorder     Hyperlipidemia     Hypertension     Meningioma (HCC)     Poor memory     TIA (transient ischemic attack)     Type II or unspecified type diabetes mellitus without mention of complication, not stated as uncontrolled        PAST SURGICAL HISTORY    Past Surgical History:   Procedure Laterality Date    CHOLECYSTECTOMY      COLONOSCOPY      HYSTEROSCOPY      LIPOMA RESECTION         FAMILY HISTORY    Family History   Problem Relation Age of Onset    Cancer Mother     Cancer Father     Diabetes Maternal Aunt     Diabetes Other        SOCIAL HISTORY    Social History     Tobacco Use    Smoking status: Never Smoker    Smokeless tobacco: Never Used    Tobacco comment: has been around lots of second hand smoke (mother)   Substance Use Topics    Alcohol use: No    Drug use: No       ALLERGIES    No Known Allergies    MEDICATIONS    No current facility-administered medications on file prior to encounter.       Current Outpatient Medications on File Prior to Encounter   Medication Sig Dispense Refill    gentamicin (GARAMYCIN) 0.1 % ointment Apply topically 2 times daily       triamcinolone (KENALOG) 0.1 % cream Apply topically 2 times daily       gabapentin (NEURONTIN) 100 MG capsule TAKE ONE CAPSULE BY MOUTH THREE TIMES A DAY 45 capsule 0    metoprolol succinate (TOPROL XL) 25 MG extended release tablet TAKE ONE TABLET BY MOUTH DAILY 90 tablet 2    pravastatin (PRAVACHOL) 40 MG tablet TAKE ONE TABLET BY MOUTH DAILY 87 tablet 1    Lancets (ONETOUCH DELICA PLUS [id number]) MISC TEST ONCE DAILY AS DIRECTED 100 each 10    metFORMIN (GLUCOPHAGE) 500 MG tablet TAKE 1 TABLET BY MOUTH TWICE DAILY WITH MEALS 180 tablet 3    lisinopril (PRINIVIL;ZESTRIL) 10 MG tablet TAKE 1 TABLET BY MOUTH TWICE DAILY 180 tablet 3    hydroCHLOROthiazide (MICROZIDE) 12.5 MG capsule TAKE ONE CAPSULE BY MOUTH EVERY MORNING 90 capsule 3    donepezil (ARICEPT) 10 MG tablet TAKE 1 TABLET BY MOUTH EVERY NIGHT AT BEDTIME 90 tablet 3    clopidogrel (PLAVIX) 75 MG tablet TAKE ONE TABLET BY MOUTH DAILY 90 tablet 3    aspirin 81 MG EC tablet Take 81 mg by mouth daily.            Objective    /67   Pulse 102   Temp 98.5 °F (36.9 °C) (Oral)   Resp 16   Ht 5' 6\" (1.676 m)   Wt 126 lb 1.6 oz (57.2 kg)   SpO2 99%   BMI 20.35 kg/m²     LABS:  WBC:    Lab Results   Component Value Date    WBC 5.5 04/15/2021     H/H:    Lab Results   Component Value Date    HGB 7.6 04/15/2021    HCT 24.4 04/15/2021     PTT:    Lab Results   Component Value Date    APTT 30.5 04/13/2021   [APTT}  PT/INR:    Lab Results   Component Value Date    PROTIME 13.1 04/13/2021    INR 1.13 04/13/2021     HgBA1c:    Lab Results   Component Value Date    LABA1C 6.3 04/13/2021       Assessment   Lokesh Risk Score: Lokesh Scale Score: 15    Patient Active Problem List   Diagnosis Code    Diabetes mellitus type 2, controlled ([facility] E11.9    Pure hypercholesterolemia E78.00    Gait disorder R26.9    TIA (transient ischemic attack) G45.9    Abnormal mammogram R92.8    Diabetic neuropathy Wound Width (cm) 1 cm 04/13/21 2130   Wound Depth (cm) 0.5 cm 04/13/21 2130   Wound Surface Area (cm^2) 3.5 cm^2 04/13/21 2130   Wound Volume (cm^3) 1.75 cm^3 04/13/21 2130   Wound Assessment Pink/red 04/15/21 0845   Drainage Amount None 04/15/21 0845   Odor Malodorous/putrid 04/14/21 2031   Lauryn-wound Assessment Excoriated 04/15/21 0845   Number of days: 1       Wound 04/13/21 Groin Right; Lower lower wound next to labia (Active)   Wound Image   04/15/21 1751   Wound Etiology Other 04/14/21 2031   Wound Cleansed Other (Comment) 04/13/21 2130   Dressing/Treatment Open to air 04/15/21 0845   Wound Length (cm) 3 cm 04/13/21 2130   Wound Width (cm) 0.5 cm 04/13/21 2130   Wound Depth (cm) 0.25 cm 04/13/21 2130   Wound Surface Area (cm^2) 1.5 cm^2 04/13/21 2130   Wound Volume (cm^3) 0.38 cm^3 04/13/21 2130   Wound Assessment Pink/red 04/15/21 0845   Drainage Amount None 04/15/21 0845   Odor Malodorous/putrid 04/14/21 2031   Lauryn-wound Assessment Excoriated 04/15/21 0845   Number of days: 1       Wound Vagina Right labia (Active)   Wound Etiology Other 04/14/21 2031   Wound Cleansed Other (Comment) 04/13/21 2130   Dressing/Treatment Open to air 04/15/21 0845   Wound Length (cm) 1.5 cm 04/13/21 2130   Wound Width (cm) 0.25 cm 04/13/21 2130   Wound Depth (cm) 0.25 cm 04/13/21 2130   Wound Surface Area (cm^2) 0.38 cm^2 04/13/21 2130   Wound Volume (cm^3) 0.09 cm^3 04/13/21 2130   Wound Assessment Other (Comment) 04/15/21 0845   Drainage Amount Small 04/15/21 0845   Drainage Description Purulent 04/15/21 0845   Odor Malodorous/putrid 04/15/21 0845   Lauryn-wound Assessment Excoriated 04/15/21 0845   Number of days:        Wound 04/13/21 Buttocks Right; Lower stage 2 (Active)   Wound Etiology Pressure Stage  2 04/15/21 0845   Wound Cleansed Other (Comment) 04/13/21 2130   Dressing/Treatment Foam 04/15/21 0845   Wound Assessment Pink/red 04/15/21 0845   Drainage Amount None 04/15/21 0845   Odor None 04/15/21 0845   Lauryn-wound Assessment Excoriated 04/15/21 0845   Number of days: 1       Wound 04/13/21 Groin Left to the left of left labia (Active)   Wound Image   04/15/21 1751   Wound Etiology Other 04/14/21 2031   Wound Cleansed Other (Comment) 04/13/21 2130   Dressing/Treatment Open to air 04/15/21 0845   Wound Length (cm) 0.25 cm 04/13/21 2130   Wound Width (cm) 0.25 cm 04/13/21 2130   Wound Depth (cm) 0.25 cm 04/13/21 2130   Wound Surface Area (cm^2) 0.06 cm^2 04/13/21 2130   Wound Volume (cm^3) 0.02 cm^3 04/13/21 2130   Wound Assessment Other (Comment) 04/15/21 0845   Drainage Amount None 04/15/21 0845   Odor Malodorous/putrid 04/15/21 0845   Lauryn-wound Assessment Intact 04/15/21 0845   Number of days: 1       Wound 04/13/21 Groin Anterior; Left (Active)   Wound Etiology Other 04/14/21 2031   Wound Cleansed Other (Comment) 04/13/21 2130   Dressing/Treatment Open to air 04/15/21 0845   Wound Length (cm) 1 cm 04/13/21 2130   Wound Width (cm) 0.25 cm 04/13/21 2130   Wound Surface Area (cm^2) 0.25 cm^2 04/13/21 2130   Wound Assessment Pink/red 04/15/21 0845   Drainage Amount Scant 04/15/21 0845   Drainage Description Purulent 04/15/21 0845   Odor Malodorous/putrid 04/15/21 0845   Lauryn-wound Assessment Intact 04/15/21 0845   Number of days: 1       Wound 04/13/21 Buttocks Left (Active)   Wound Etiology Pressure Stage  2 04/14/21 2031   Wound Cleansed Other (Comment) 04/13/21 2130   Dressing/Treatment Foam 04/15/21 0845   Wound Assessment Pink/red 04/15/21 0845   Drainage Amount None 04/15/21 0845   Odor None 04/15/21 0845   Lauryn-wound Assessment Excoriated 04/15/21 0845   Number of days: 1     Patient seen for moisture associated skin damage and pressure injuries that are healing. Patient reports she is from home and her daughter takes care of her. Patient was incontinent of urine. With help from nurse Martinez, incontinence care provided with blue wipes and pad changed. Patient needed help turning in bed.     Patient has healing pressure injury to right hip. Also there is another healed area to the inner thigh near the right outer labia that has pink skin. Patient has red areas along gluteal cleft where skin is eroded. There is a red open area to the left top of vagina that looks like skin erosion next to a mole. Patient is on a specialty mattress. Will continue using zinc,  Blue incontinence wipes and assisting the patient to turn every two hours. Left of vagina      Right inner posterior thigh      Right hip        IAD and healing pressure injury         Response to treatment:  Well tolerated by patient. Pain Assessment:  Severity:  0 / 10  Quality of pain: N/A  Wound Pain Timing/Severity: none  Premedicated: No    Plan   Plan of Care: Wound 04/13/21 Hip Right unstageable-Dressing/Treatment: Foam  Wound 04/13/21 Groin Anterior;Right-Dressing/Treatment: Open to air  Wound 04/13/21 Groin Right;Upper uppermost wound next to labia-Dressing/Treatment: Open to air  Wound 04/13/21 Groin Right; Lower lower wound next to labia-Dressing/Treatment: Open to air  Wound Vagina Right labia-Dressing/Treatment: Open to air  Wound 04/13/21 Buttocks Right; Lower stage 2-Dressing/Treatment: Foam  Wound 04/13/21 Groin Left to the left of left labia-Dressing/Treatment: Open to air  Wound 04/13/21 Groin Anterior; Left-Dressing/Treatment: Open to air  Wound 04/13/21 Buttocks Left-Dressing/Treatment: Foam    Specialty Bed Required : Yes   [x] Low Air Loss   [x] Pressure Redistribution  [] Fluid Immersion  [] Bariatric  [] Total Pressure Relief  [] Other:     Current Diet: DIET CARB CONTROL;   Dietary Nutrition Supplements: Diabetic Oral Supplement  Dietician consult:  No    Discharge Plan:  Placement for patient upon discharge: home with support    Patient appropriate for Outpatient [address]: No    Referrals:  []   [] [address] [employer] [facility]  [] Supplies  [] Other    Patient/Caregiver Teaching:  Level of patient/caregiver understanding able to:   [] Indicates understanding       [] Needs reinforcement  [] Unsuccessful      [] Verbal Understanding  [] Demonstrated understanding       [] No evidence of learning  [] Refused teaching         [] N/A       Electronically signed by  SAMREEN Pereyra, RN  Wound/Ostomy Care on 4/15/2021 at 5:55 PM

## 2021-04-15 NOTE — DISCHARGE INSTR - COC
Continuity of Care Form    Patient Name: Shabbir Lawrence   :  1931  MRN:  [id number]    Admit date:  2021  Discharge date: 21    Code Status Order: Full Code   Advance Directives:   Advance Care Flowsheet Documentation       Date/Time Healthcare Directive Type of Healthcare Directive Copy in [address] Agent's Name Healthcare Agent's Phone Number    [phone]  Yes, patient has an advance directive for healthcare treatment  Durable power of  for health care;Living will  No, copy requested from family  Adult [address]  [phone]            Admitting Physician:  Drea Doss MD  PCP: Shavon Cosme MD    Discharging Nurse: [facility] Unit/Room#: 3PA-8776/5544-33  Discharging Unit Phone Number: [phone]    Emergency Contact:   Extended Emergency Contact Information  Primary Emergency Contact: Angel Connell, [address] Phone: [phone]  Relation: Child  Secondary Emergency Contact: Angel Connell, [address] Phone: [phone]  Relation: Child    Past Surgical History:  Past Surgical History:   Procedure Laterality Date    CHOLECYSTECTOMY      COLONOSCOPY      HYSTEROSCOPY      LIPOMA RESECTION         Immunization History:   Immunization History   Administered Date(s) Administered    Influenza, Quadv, adjuvanted, 65 yrs +, IM, PF (Fluad) 2020    Pneumococcal Conjugate 13-valent ([facility] Mort) 2015    Pneumococcal Polysaccharide (Hoxjeokaq13) 2013       Active Problems:  Patient Active Problem List   Diagnosis Code    Diabetes mellitus type 2, controlled ([address] E11.9    Pure hypercholesterolemia E78.00    Gait disorder R26.9    TIA (transient ischemic attack) G45.9    Abnormal mammogram R92.8    Diabetic neuropathy (HCC) E11.40    Low back pain M54.5    Meningioma ([facility] D32.9    Impacted cerumen H61.20    Perennial allergic rhinitis J30.89    Poor memory R41.3    Chronic external ear infection H60.60    Left shoulder pain M25.512    Dysphagia R13.10    Cyst [id number]    Laryngopharyngeal reflux disease K21.9    Chronic eczematous otitis externa of both ears H60.8X3    Thyroid nodule E04.1    Risk for falls Z91.81    Heart palpitations R00.2    Hypertension I10    Recurrent sinusitis J32.9    Bilateral impacted cerumen H61.23    Left knee DJD M17.12    Late onset Alzheimer's disease without behavioral disturbance (HCC) G30.1, F02.80    Failure to thrive in adult R62.7    Diverticulitis large intestine K57.32    Moderate malnutrition (HCC) E44.0       Isolation/Infection:   Isolation            No Isolation          Patient Infection Status       None to display            Nurse Assessment:  Last Vital Signs: /66   Pulse 106   Temp 98.8 °F (37.1 °C) (Oral)   Resp 16   Ht 5' 6\" (1.676 m)   Wt 126 lb 1.6 oz (57.2 kg)   SpO2 96%   BMI 20.35 kg/m²     Last documented pain score (0-10 scale): Pain Level: 0  Last Weight:   Wt Readings from Last 1 Encounters:   04/14/21 126 lb 1.6 oz (57.2 kg)     Mental Status:  disoriented, oriented, alert and Patient disoriented to time    IV Access:  - None    Nursing Mobility/ADLs:  Walking   Assisted  Transfer  Assisted  Bathing  Dependent  Dressing  Assisted  Toileting  Dependent  Feeding  Assisted  Med Admin  Dependent  Med Delivery   whole    Wound Care Documentation and Therapy:  Wound 04/13/21 Hip Right unstageable (Active)   Wound Etiology Pressure Unstageable 04/15/21 0845   Dressing Status New dressing applied;Clean;Dry; Intact 04/15/21 0845   Dressing/Treatment Foam 04/15/21 0845   Wound Length (cm) 1.75 cm 04/13/21 2130   Wound Width (cm) 2.5 cm 04/13/21 2130   Wound Surface Area (cm^2) 4.38 cm^2 04/13/21 2130   Wound Assessment Eschar dry;Slough 04/15/21 0845   Drainage Amount None 04/15/21 0845   Odor None 04/15/21 0845   Lauryn-wound Assessment Intact 04/15/21 0845   Number of days: 1       Wound 04/13/21 Groin Anterior;Right (Active)   Wound Etiology Other 04/14/21 2031   Wound Cleansed Other (Comment) 04/13/21 2130   Dressing/Treatment Open to air 04/15/21 0845   Wound Assessment Other (Comment) 04/15/21 0845   Drainage Amount None 04/15/21 0845   Odor None 04/15/21 0845   Lauryn-wound Assessment Intact 04/15/21 0845   Number of days: 1       Wound 04/13/21 Groin Right;Upper uppermost wound next to labia (Active)   Wound Etiology Other 04/14/21 2031   Wound Cleansed Other (Comment) 04/13/21 2130   Dressing/Treatment Open to air 04/15/21 0845   Wound Length (cm) 3.5 cm 04/13/21 2130   Wound Width (cm) 1 cm 04/13/21 2130   Wound Depth (cm) 0.5 cm 04/13/21 2130   Wound Surface Area (cm^2) 3.5 cm^2 04/13/21 2130   Wound Volume (cm^3) 1.75 cm^3 04/13/21 2130   Wound Assessment Pink/red 04/15/21 0845   Drainage Amount None 04/15/21 0845   Odor Malodorous/putrid 04/14/21 2031   Lauryn-wound Assessment Excoriated 04/15/21 0845   Number of days: 1       Wound 04/13/21 Groin Right; Lower lower wound next to labia (Active)   Wound Etiology Other 04/14/21 2031   Wound Cleansed Other (Comment) 04/13/21 2130   Dressing/Treatment Open to air 04/15/21 0845   Wound Length (cm) 3 cm 04/13/21 2130   Wound Width (cm) 0.5 cm 04/13/21 2130   Wound Depth (cm) 0.25 cm 04/13/21 2130   Wound Surface Area (cm^2) 1.5 cm^2 04/13/21 2130   Wound Volume (cm^3) 0.38 cm^3 04/13/21 2130   Wound Assessment Pink/red 04/15/21 0845   Drainage Amount None 04/15/21 0845   Odor Malodorous/putrid 04/14/21 2031   Lauryn-wound Assessment Excoriated 04/15/21 0845   Number of days: 1       Wound Vagina Right labia (Active)   Wound Etiology Other 04/14/21 2031   Wound Cleansed Other (Comment) 04/13/21 2130   Dressing/Treatment Open to air 04/15/21 0845   Wound Length (cm) 1.5 cm 04/13/21 2130   Wound Width (cm) 0.25 cm 04/13/21 2130   Wound Depth (cm) 0.25 cm 04/13/21 2130   Wound Surface Area (cm^2) 0.38 cm^2 04/13/21 2130   Wound Volume (cm^3) 0.09 cm^3 04/13/21 2130   Wound Assessment Other (Comment) 04/15/21 0845   Drainage Amount Small 04/15/21 0845   Drainage Description Purulent 04/15/21 0845   Odor Malodorous/putrid 04/15/21 0845   Lauryn-wound Assessment Excoriated 04/15/21 0845   Number of days:        Wound 04/13/21 Buttocks Right; Lower stage 2 (Active)   Wound Etiology Pressure Stage  2 04/15/21 0845   Wound Cleansed Other (Comment) 04/13/21 2130   Dressing/Treatment Foam 04/15/21 0845   Wound Assessment Pink/red 04/15/21 0845   Drainage Amount None 04/15/21 0845   Odor None 04/15/21 0845   Lauryn-wound Assessment Excoriated 04/15/21 0845   Number of days: 1       Wound 04/13/21 Groin Left to the left of left labia (Active)   Wound Etiology Other 04/14/21 2031   Wound Cleansed Other (Comment) 04/13/21 2130   Dressing/Treatment Open to air 04/15/21 0845   Wound Length (cm) 0.25 cm 04/13/21 2130   Wound Width (cm) 0.25 cm 04/13/21 2130   Wound Depth (cm) 0.25 cm 04/13/21 2130   Wound Surface Area (cm^2) 0.06 cm^2 04/13/21 2130   Wound Volume (cm^3) 0.02 cm^3 04/13/21 2130   Wound Assessment Other (Comment) 04/15/21 0845   Drainage Amount None 04/15/21 0845   Odor Malodorous/putrid 04/15/21 0845   Lauryn-wound Assessment Intact 04/15/21 0845   Number of days: 1       Wound 04/13/21 Groin Anterior; Left (Active)   Wound Etiology Other 04/14/21 2031   Wound Cleansed Other (Comment) 04/13/21 2130   Dressing/Treatment Open to air 04/15/21 0845   Wound Length (cm) 1 cm 04/13/21 2130   Wound Width (cm) 0.25 cm 04/13/21 2130   Wound Surface Area (cm^2) 0.25 cm^2 04/13/21 2130   Wound Assessment Pink/red 04/15/21 0845   Drainage Amount Scant 04/15/21 0845   Drainage Description Purulent 04/15/21 0845   Odor Malodorous/putrid 04/15/21 0845   Lauryn-wound Assessment Intact 04/15/21 0845   Number of days: 1       Wound 04/13/21 Buttocks Left (Active)   Wound Etiology Pressure Stage  2 04/14/21 2031   Wound Cleansed Other (Comment) 04/13/21 2130   Dressing/Treatment Foam 04/15/21 0845   Wound Assessment Pink/red 04/15/21 0845   Drainage Amount None 04/15/21 0845   Odor None 04/15/21 0845   Lauryn-wound Assessment Excoriated 04/15/21 0845   Number of days: 1        Elimination:  Continence:   · Bowel: No  · Bladder: No  Urinary Catheter: None   Colostomy/Ileostomy/Ileal Conduit: No       Date of Last BM: 4/13/21    Intake/Output Summary (Last 24 hours) at 4/15/2021 1151  Last data filed at 4/15/2021 0435  Gross per 24 hour   Intake 2356 ml   Output --   Net 2356 ml     I/O last 3 completed shifts: In: 1747 [I.V.:2356]  Out: -     Safety Concerns:     None    Impairments/Disabilities:      None    Nutrition Therapy:  Current Nutrition Therapy:   - Oral Diet:  Carb Control 5 carbs/meal (2000kcals/day)    Routes of Feeding: Oral  Liquids: No Liquids  Daily Fluid Restriction: no  Last Modified Barium Swallow with Video (Video Swallowing Test): not done    Treatments at the Time of Hospital Discharge:   Respiratory Treatments: ***  Oxygen Therapy:  is not on home oxygen therapy.   Ventilator:    [address] [employer] Vent LFQA:[id number]}    Rehab Therapies: Physical Therapy and Occupational Therapy  Weight Bearing Status/Restrictions: No weight bearing restirctions  Other Medical Equipment (for information only, NOT a DME order):  wheelchair  Other Treatments: ***    Patient's personal belongings (please select all that are sent with patient):  None    RN SIGNATURE:  Electronically signed by Alexa Bueno RN on 4/16/21 at 3:31 PM EDT    CASE MANAGEMENT/SOCIAL WORK SECTION    Inpatient Status Date: 04/13/21    Readmission Risk Assessment Score:  Readmission Risk              Risk of Unplanned Readmission:        14           Discharging to Facility/ Agency   · Name: [facility]  · Address:  · Phone:[phone]  · Fax: [phone]    / signature: Electronically signed by Fadumo Russo OCTAVIA Mejia on 4/15/2021 at 11:52 AM      PHYSICIAN SECTION    Prognosis: Guarded    Condition at Discharge: Stable    Rehab Potential (if transferring to Rehab): Good    Recommended Labs or Other Treatments After Discharge: ***    Physician Certification: I certify the above information and transfer of Zoya Willams  is necessary for the continuing treatment of the diagnosis listed and that she requires [place] for greater 30 days.      Update Admission H&P: No change in H&P    PHYSICIAN SIGNATURE:  Electronically signed by Kathi Malcolm MD on 4/16/21 at 3:14 PM EDT

## 2021-04-16 VITALS
OXYGEN SATURATION: 97 % | HEART RATE: 65 BPM | HEIGHT: 66 IN | SYSTOLIC BLOOD PRESSURE: 125 MMHG | WEIGHT: 126.1 LBS | DIASTOLIC BLOOD PRESSURE: 69 MMHG | RESPIRATION RATE: 16 BRPM | BODY MASS INDEX: 20.27 KG/M2 | TEMPERATURE: 98.3 F

## 2021-04-16 LAB
GLUCOSE BLD-MCNC: 101 MG/DL (ref 70–99)
GLUCOSE BLD-MCNC: 104 MG/DL (ref 70–99)
GLUCOSE BLD-MCNC: 126 MG/DL (ref 70–99)
GLUCOSE BLD-MCNC: 77 MG/DL (ref 70–99)
PERFORMED ON: ABNORMAL
PERFORMED ON: NORMAL

## 2021-04-16 PROCEDURE — 96366 THER/PROPH/DIAG IV INF ADDON: CPT

## 2021-04-16 PROCEDURE — 97535 SELF CARE MNGMENT TRAINING: CPT

## 2021-04-16 PROCEDURE — U0005 INFEC AGEN DETEC AMPLI PROBE: HCPCS

## 2021-04-16 PROCEDURE — 6360000002 HC RX W HCPCS: Performed by: INTERNAL MEDICINE

## 2021-04-16 PROCEDURE — U0003 INFECTIOUS AGENT DETECTION BY NUCLEIC ACID (DNA OR RNA); SEVERE ACUTE RESPIRATORY SYNDROME CORONAVIRUS 2 (SARS-COV-2) (CORONAVIRUS DISEASE [COVID-19]), AMPLIFIED PROBE TECHNIQUE, MAKING USE OF HIGH THROUGHPUT TECHNOLOGIES AS DESCRIBED BY CMS-2020-01-R: HCPCS

## 2021-04-16 PROCEDURE — G0378 HOSPITAL OBSERVATION PER HR: HCPCS

## 2021-04-16 PROCEDURE — 2580000003 HC RX 258: Performed by: INTERNAL MEDICINE

## 2021-04-16 PROCEDURE — 97530 THERAPEUTIC ACTIVITIES: CPT

## 2021-04-16 PROCEDURE — 96372 THER/PROPH/DIAG INJ SC/IM: CPT

## 2021-04-16 PROCEDURE — 2500000003 HC RX 250 WO HCPCS: Performed by: INTERNAL MEDICINE

## 2021-04-16 RX ADMIN — ENOXAPARIN SODIUM 40 MG: 40 INJECTION SUBCUTANEOUS at 08:51

## 2021-04-16 RX ADMIN — METRONIDAZOLE 500 MG: 500 INJECTION, SOLUTION INTRAVENOUS at 11:18

## 2021-04-16 RX ADMIN — SODIUM CHLORIDE: 9 INJECTION, SOLUTION INTRAVENOUS at 00:38

## 2021-04-16 RX ADMIN — CIPROFLOXACIN 400 MG: 2 INJECTION, SOLUTION INTRAVENOUS at 08:47

## 2021-04-16 RX ADMIN — Medication 10 ML: at 08:51

## 2021-04-16 RX ADMIN — METRONIDAZOLE 500 MG: 500 INJECTION, SOLUTION INTRAVENOUS at 00:38

## 2021-04-16 RX ADMIN — METRONIDAZOLE 500 MG: 500 INJECTION, SOLUTION INTRAVENOUS at 16:56

## 2021-04-16 NOTE — CARE COORDINATION
Received call from [facility] stating that precert has been obtained, but a PCR COVID test is needed prior to admission. PCR COVID ordered. Electronically signed by OCTAVIA Pulliam on 4/16/2021 at 12:41 PM    ADDENDUM:  Christina Gomez in admissions stated that since pt has been vaccinated, no PCR COVID test needed. Copy of COVID vaccination being sent to facility. Able to accept pt today.   Electronically signed by OCTAVIA Pulliamon 4/16/2021 at 3:15 PM

## 2021-04-16 NOTE — PLAN OF CARE
Nutrition Problem #1: Increased nutrient needs  Intervention: Food and/or Nutrient Delivery: Continue Current Diet, Modify Oral Nutrition Supplement  Nutritional Goals: PO intake 50% or greater of meals & supplement

## 2021-04-16 NOTE — PLAN OF CARE
Problem: Falls - Risk of:  Goal: Will remain free from falls  Description: Will remain free from falls  4/15/2021 2300 by Chasidy Aldana RN  Outcome: Ongoing     Problem: Falls - Risk of:  Goal: Absence of physical injury  Description: Absence of physical injury  4/15/2021 2300 by Chasidy Aldana RN  Outcome: Ongoing     Problem: Skin Integrity:  Goal: Will show no infection signs and symptoms  Description: Will show no infection signs and symptoms  4/15/2021 2300 by Chasidy Aldana RN  Outcome: Ongoing     Problem: Skin Integrity:  Goal: Absence of new skin breakdown  Description: Absence of new skin breakdown  4/15/2021 2300 by Chasidy Aldana RN  Outcome: Ongoing     Problem: Pain:  Goal: Pain level will decrease  Description: Pain level will decrease  4/15/2021 2300 by Chasidy Aldana RN  Outcome: Ongoing     Problem: Pain:  Goal: Control of acute pain  Description: Control of acute pain  4/15/2021 2300 by Chasidy Aldana RN  Outcome: Ongoing     Problem: Pain:  Goal: Control of chronic pain  Description: Control of chronic pain  4/15/2021 2300 by Chasidy Aldana RN  Outcome: Ongoing     Problem: Nutrition  Goal: Optimal nutrition therapy  4/15/2021 2300 by Chasidy Aldana RN  Outcome: Ongoing

## 2021-04-16 NOTE — PROGRESS NOTES
Occupational Therapy  Facility/Department: [address] ORTHO/NEURO NURSING  Daily Treatment Note  NAME: Walter Mcbride  : 1931  MRN: [id number]    Date of Service: 2021    Discharge Recommendations:    Walter Mcbride scored a  on the AM-PAC ADL Inpatient form. Current research shows that an AM-PAC score of 17 or less is typically not associated with a discharge to the patient's home setting. Based on the patient's AM-PAC score and their current ADL deficits, it is recommended that the patient have 3-5 sessions per week of Occupational Therapy at d/c to increase the patient's independence. Please see assessment section for further patient specific details. If patient discharges prior to next session this note will serve as a discharge summary. Please see below for the latest assessment towards goals. Assessment   Performance deficits / Impairments: Decreased functional mobility ; Decreased ADL status; Decreased safe awareness;Decreased cognition;Decreased balance;Decreased endurance  Assessment: Patient presents with the above deficits impacting occupational performance and functioning below baseline level. Pt struggled to follow direction even with max verbal and tactile cues, pt benfited from one word direction. When being transferred pt allowed her feet to slide forward and grabbed therapist neck even with max verbal cues for hand/foot placement. Ella Alexandra Pt chewed her food and spit it out stating \"that's how I always eat\". Pt would benefit skilled OT to address defiicts and promote functional independence followed by post acute stay. Patient is unsafe at this time to stay in her home alone. Treatment Diagnosis: Decreased functional mobility, ADLs, safety, cognition, endurance associated with failure to thrive. Prognosis: Fair  OT Education: Orientation;Transfer Training;ADL Adaptive Strategies  Patient Education: Patient is dependent for learning.   Will require reinforcement and assist  Barriers to Learning: hearing/cognition  REQUIRES OT FOLLOW UP: Yes  Activity Tolerance  Activity Tolerance: Patient Tolerated treatment well;Patient limited by fatigue;Patient limited by pain  Activity Tolerance: Pt has mutiple pressure injuries on body and struggled with movement d/t pain. Safety Devices  Safety Devices in place: Yes  Type of devices: All fall risk precautions in place;Call light within reach; Chair alarm in place;Nurse notified; Patient at risk for falls;Gait belt;Left in chair         Patient Diagnosis(es): The primary encounter diagnosis was Frequent falls. A diagnosis of Elevated troponin was also pertinent to this visit. has a past medical history of Anxiety, Diabetic neuropathy ([address] Dysphagia, Gait disorder, Hyperlipidemia, Hypertension, Meningioma ([facility] [address] Poor memory, TIA (transient ischemic attack), and Type II or unspecified type diabetes mellitus without mention of complication, not stated as uncontrolled. has a past surgical history that includes Cholecystectomy; hysteroscopy; lipoma resection; and Colonoscopy. Restrictions  Restrictions/Precautions  Restrictions/Precautions: Fall Risk, Up as Tolerated, Modified Diet(High fall risk, carb control, up as tolerated)  Position Activity Restriction  Other position/activity restrictions: Isidoro Myers is a 80 y.o. female who presents to the emergency department today with her daughter for evaluation for frequent falls. The daughter states that the patient lives at home, she states that she does live across the street. The daughter states that she is with her 6 to 8 hours/day, but essentially the patient lives alone. The daughter states that over the past 3 days the patient has had increasing fatigue, and she states that she has had multiple falls. Subjective   General  Chart Reviewed:  Yes  Additional Pertinent Hx: HLD, HTN, DM with neuropathy  Response to previous treatment: Patient with no complaints from previous session  Family / Caregiver Present: No  Diagnosis: failure to thrive  Subjective  Subjective: Pt supine in bed upon arrival, agreeable to treatment, pt reported a 8/10 pain when she moves but 0/10 pain when still. Pre Treatment Pain Screening  Intervention List: Patient able to continue with treatment;Nurse/Physician notified   Orientation  Orientation  Overall Orientation Status: Impaired  Orientation Level: Disoriented to place; Disoriented to time;Oriented to person;Disoriented to situation  Objective    ADL  Feeding: Setup;Verbal cueing;Minimal assistance(Pt required verbal and tactile cues to intiate.)  UE Dressing: Setup;Verbal cueing; Increased time to complete; Moderate assistance(Pt needed verbal and tactile cues to initiate task. Mod A for gown mgt.)  Additional Comments: Pt declined all other ADL's. While eating pt would chew her food and spit it out stating \"this is how I always eat\" pt never swallowed food. Balance  Sitting Balance: Dependent/Total(Max of 2 intially, progressed to CGA and returned to Max of 2 with fatigue.)  Standing Balance: Dependent/Total(of 2; Pt would slide feet out during stance and not support her body even with verbal and tactile cues.)  Standing Balance  Comment: Not addressed d/t pt not able to maintain stance. Bed mobility  Supine to Sit: Dependent/Total(Max of 2, pt was given cues to hold grab bar on bed, however pt grabbed therapist arm to pull herself up.)  Scootin Person assistance;Dependent/Total(Max of 2)  Transfers  Stand Pivot Transfers: 2 Person assistance;Dependent/Total(Pt allowed feet to slide out during transfer not maintaining any body support.)  Transfer Comments: Pt was given verbal and tactile cues for feet/hand placement, however pt unable to follow direction and grabbed onto therapist neck for transfer.      Cognition  Overall Cognitive Status: Exceptions  Arousal/Alertness: Inconsistent responses to stimuli  Following Commands: Inconsistently follows commands  Attention Span: Difficulty attending to directions; Difficulty dividing attention  Memory: Decreased recall of biographical Information;Decreased short term memory;Decreased recall of recent events  Safety Judgement: Decreased awareness of need for safety  Problem Solving: Decreased awareness of errors  Insights: Decreased awareness of deficits  Initiation: Requires cues for all  Sequencing: Requires cues for all         Plan   Plan  Times per week: 3-5  Times per day: Daily  Current Treatment Recommendations: Functional Mobility Training, Endurance Training, Safety Education & Training, Self-Care / ADL, Equipment Evaluation, Education, & procurement, Patient/Caregiver Education & Training    AM-PAC Score        [facility] Inpatient Daily Activity Raw Score: 12 (04/16/21 1547)  AM-PAC Inpatient ADL T-Scale Score : 30.6 (04/16/21 1547)  ADL Inpatient CMS 0-100% Score: 66.57 (04/16/21 1547)  ADL Inpatient CMS G-Code Modifier : CL (04/16/21 1547)    Goals  Short term goals  Time Frame for Short term goals: Discharge  Short term goal 1: Max of 1 for functional ADL transfer--- D of two 4/16  Short term goal 2: Max of 1 for functional stance to prepare for transfers--- Not addressed, stand pivot transfer only w/D of 2 4/16  Short term goal 3: Grooming task with set up--- Not addressed 4/16  Short term goal 4: Dressing/bathing UE with Mod A--- Mod A UE dressing gown 4/16  Long term goals  Time Frame for Long term goals : LTG=STG       Therapy Time   Individual Concurrent Group Co-treatment   Time In       1325   Time Out       1405   Minutes       40      Timed Code Treatment Minutes:  40 Minutes   Total Treatment Minutes:  [id number] [address] [employer] [address]/REGINA    C/ Mauro [address], OT   I have directly observed this treatment and have read and approve this note.    Carey Oilvia., OTR/L, [id number]

## 2021-04-16 NOTE — PROGRESS NOTES
Shift assessment completed. VSS. Patient resting in bed quietly. Specialty mattress in place. Medications given per MAR. The care plan and education have been reviewed and mutually agreed upon with the patient. Call light in reach. Will continue to monitor. 1620 Report called to [facility] at [facility]. Unit phone number given if any questions.

## 2021-04-16 NOTE — PROGRESS NOTES
in NaCl 100 mL IVPB premix, 500 mg, Intravenous, Q8H  0.9 % sodium chloride infusion, , Intravenous, Continuous  sodium chloride flush 0.9 % injection 5-40 mL, 5-40 mL, Intravenous, 2 times per day  sodium chloride flush 0.9 % injection 5-40 mL, 5-40 mL, Intravenous, PRN  0.9 % sodium chloride infusion, 25 mL, Intravenous, PRN  potassium chloride (KLOR-CON M) extended release tablet 40 mEq, 40 mEq, Oral, PRN **OR** potassium bicarb-citric acid (EFFER-K) effervescent tablet 40 mEq, 40 mEq, Oral, PRN **OR** potassium chloride 10 mEq/100 mL IVPB (Peripheral Line), 10 mEq, Intravenous, PRN  enoxaparin (LOVENOX) injection 40 mg, 40 mg, Subcutaneous, Daily  promethazine (PHENERGAN) tablet 12.5 mg, 12.5 mg, Oral, Q6H PRN **OR** ondansetron (ZOFRAN) injection 4 mg, 4 mg, Intravenous, Q6H PRN  magnesium hydroxide (MILK OF MAGNESIA) 400 MG/5ML suspension 30 mL, 30 mL, Oral, Daily PRN  famotidine (PEPCID) tablet 20 mg, 20 mg, Oral, Daily PRN  acetaminophen (TYLENOL) tablet 650 mg, 650 mg, Oral, Q6H PRN **OR** acetaminophen (TYLENOL) suppository 650 mg, 650 mg, Rectal, Q6H PRN  hydrALAZINE (APRESOLINE) injection 10 mg, 10 mg, Intravenous, Q6H PRN  0.9 % sodium chloride bolus, 500 mL, Intravenous, PRN  insulin lispro (1 Unit Dial) 0-12 Units, 0-12 Units, Subcutaneous, TID WC  insulin lispro (1 Unit Dial) 0-6 Units, 0-6 Units, Subcutaneous, Nightly  glucose (GLUTOSE) 40 % oral gel 15 g, 15 g, Oral, PRN  dextrose 50 % IV solution, 12.5 g, Intravenous, PRN  glucagon (rDNA) injection 1 mg, 1 mg, Intramuscular, PRN  dextrose 5 % solution, 100 mL/hr, Intravenous, PRN         Objective:  /73   Pulse 76   Temp 98.4 °F (36.9 °C) (Oral)   Resp 16   Ht 5' 6\" (1.676 m)   Wt 126 lb 1.6 oz (57.2 kg)   SpO2 98%   BMI 20.35 kg/m²      Patient Vitals for the past 24 hrs:   BP Temp Temp src Pulse Resp SpO2   04/16/21 0530 130/73 98.4 °F (36.9 °C) Oral 76 16 98 %   04/15/21 2021 121/70 98.7 °F (37.1 °C) Oral 99 15 97 %   04/15/21 1238 130/67 98.5 °F (36.9 °C) Oral 102 16 99 %   04/15/21 0845 103/66 98.8 °F (37.1 °C) Oral 106 16 96 %     Patient Vitals for the past 96 hrs (Last 3 readings):   Weight   04/14/21 1915 126 lb 1.6 oz (57.2 kg)   04/13/21 1158 128 lb (58.1 kg)           Intake/Output Summary (Last 24 hours) at 4/16/2021 9721  Last data filed at 4/15/2021 1238  Gross per 24 hour   Intake 240 ml   Output --   Net 240 ml         Physical Exam:   /73   Pulse 76   Temp 98.4 °F (36.9 °C) (Oral)   Resp 16   Ht 5' 6\" (1.676 m)   Wt 126 lb 1.6 oz (57.2 kg)   SpO2 98%   BMI 20.35 kg/m²   General appearance: alert, appears stated age and cooperative  Head: Normocephalic, without obvious abnormality, atraumatic  Lungs: clear to auscultation bilaterally  Heart: regular rate and rhythm, S1, S2 normal, no murmur, click, rub or gallop  Abdomen: soft, non-tender; bowel sounds normal; no masses,  no organomegaly  Ext: no edema    Labs:  Lab Results   Component Value Date    WBC 5.5 04/15/2021    HGB 7.6 (L) 04/15/2021    HCT 24.4 (L) 04/15/2021     04/15/2021    CHOL 127 07/30/2020    TRIG 107 07/30/2020    HDL 40 07/30/2020    ALT 16 04/14/2021    AST 22 04/14/2021     04/15/2021    K 3.7 04/15/2021     (H) 04/15/2021    CREATININE 0.8 04/15/2021    BUN 13 04/15/2021    CO2 25 04/15/2021    TSH 0.63 08/01/2018    INR 1.13 04/13/2021    GLUF 77 01/04/2021    LABA1C 6.3 04/13/2021    LABMICR YES 04/13/2021     Lab Results   Component Value Date    TROPONINI 0.02 (H) 04/13/2021       Recent Imaging Results are Reviewed:  Xr Hip Bilateral W Ap Pelvis (2 Views)    Result Date: 4/13/2021  EXAMINATION: ONE XRAY VIEW OF THE PELVIS AND TWO XRAY VIEWS OF EACH OF THE BILATERAL HIPS 4/13/2021 1:19 pm COMPARISON: None.  HISTORY: ORDERING SYSTEM PROVIDED HISTORY: fall TECHNOLOGIST PROVIDED HISTORY: Reason for exam:->fall Reason for Exam: Fall (pt family member states pt with multiple recent falls- states lives by herself, but daughter lives close by and helps her- daughter states she is unable to take care of her full time would possibly like nursing home placement) Acuity: Acute Type of Exam: Initial FINDINGS: Evaluation of the pelvis limited due to limitations in patient positioning. Limited evaluation of the pubic symphysis and sacrum. Question a nondisplaced fracture of the right inferior pubic ramus. No widening of the SI joints bilaterally. Subchondral sclerosis with marginal osteophytes seen in the SI joints bilaterally No evidence of a proximal femoral fracture dislocation. Vascular calcifications are noted. Degenerative changes seen in the lower lumbar spine     Limited exam. Question a fracture involving the right inferior pubic rami. Consider CT of the pelvis as this exam is limited for detecting fractures. Ct Head Wo Contrast    Result Date: 4/13/2021  EXAMINATION: CT OF THE HEAD WITHOUT CONTRAST  4/13/2021 12:48 pm TECHNIQUE: CT of the head was performed without the administration of intravenous contrast. Dose modulation, iterative reconstruction, and/or weight based adjustment of the mA/kV was utilized to reduce the radiation dose to as low as reasonably achievable. COMPARISON: 11/05/2012 HISTORY: ORDERING SYSTEM PROVIDED HISTORY: fall TECHNOLOGIST PROVIDED HISTORY: Reason for exam:->fall Has a \"code stroke\" or \"stroke alert\" been called? ->No Decision Support Exception->Emergency Medical Condition (MA) Reason for Exam: fall Acuity: Acute Type of Exam: Initial FINDINGS: BRAIN/VENTRICLES: There is no acute intracranial hemorrhage, mass effect or midline shift. No abnormal extra-axial fluid collection. The gray-white differentiation is maintained without evidence of an acute infarct. There is prominence of the ventricles and sulci due to global parenchymal volume loss.  There are nonspecific areas of hypoattenuation within the periventricular and subcortical white matter, which likely represent chronic microvascular ischemic change. ORBITS: The visualized portion of the orbits demonstrate no acute abnormality. SINUSES: The visualized paranasal sinuses and mastoid air cells demonstrate no acute abnormality. SOFT TISSUES/SKULL: No acute abnormality of the visualized skull or soft tissues. No acute intracranial abnormality. Moderate cerebral atrophy appropriate for age. Moderate to large amount of chronic ischemic change also appropriate for age. No significant change from the prior study. Ct Cervical Spine Wo Contrast    Result Date: 4/13/2021  EXAMINATION: CT OF THE CERVICAL SPINE WITHOUT CONTRAST 4/13/2021 12:49 pm TECHNIQUE: CT of the cervical spine was performed without the administration of intravenous contrast. Multiplanar reformatted images are provided for review. Dose modulation, iterative reconstruction, and/or weight based adjustment of the mA/kV was utilized to reduce the radiation dose to as low as reasonably achievable. COMPARISON: None. HISTORY: ORDERING SYSTEM PROVIDED HISTORY: fall TECHNOLOGIST PROVIDED HISTORY: Reason for exam:->fall Decision Support Exception->Emergency Medical Condition (MA) Reason for Exam: fall Acuity: Acute Type of Exam: Initial FINDINGS: BONES/ALIGNMENT: There is no acute fracture or traumatic malalignment. DEGENERATIVE CHANGES: No significant degenerative changes considering patient's age. There is narrowing and mild spurring C6-7. SOFT TISSUES: There is no prevertebral soft tissue swelling. No acute abnormality of the cervical spine. Ct Pelvis Wo Contrast Additional Contrast? None    Result Date: 4/14/2021  EXAMINATION: CT OF THE PELVIS WITHOUT CONTRAST 4/13/2021 3:58 pm TECHNIQUE: CT of the pelvis was performed without the administration of intravenous contrast.  Multiplanar reformatted images are provided for review.  Dose modulation, iterative reconstruction, and/or weight based adjustment of the mA/kV was utilized to reduce the radiation dose to as low as reasonably achievable. COMPARISON: Plain films performed earlier today. HISTORY: ORDERING SYSTEM PROVIDED HISTORY: r/o hip fx and rami fx TECHNOLOGIST PROVIDED HISTORY: Reason for exam:->r/o hip fx and rami fx Additional Contrast?->None Decision Support Exception->Emergency Medical Condition (MA) Reason for Exam: r/o hip fx and rami fx. Acuity: Acute Type of Exam: Initial FINDINGS: The bones are diffusely osteopenic. There is no evidence of acute fracture of the pelvis or either hip. Mild symmetric osteoarthritic changes of the SI joints. Moderate degenerative change at the pubic symphysis. There is minimal symmetric osteoarthritic changes of the hips. Degenerative disc disease at L4-5 and L5-S1 with lower lumbar facet arthropathy. Grade 1 anterolisthesis of L4 on L5 and L5 on S1 is noted. There is mild edematous changes in the subcutaneous fat with no focal subcutaneous abnormality. Within the pelvis, colonic diverticulosis is noted in the sigmoid colon. There is a probable diverticulum in the cecal region with marked circumferential wall thickening measuring up to 1.3 cm. Cystic area in the left adnexa measuring 1.8 cm. No free pelvic fluid. Partial distention of the urinary bladder. Multiple calcified pelvic phleboliths. Iliac and femoral vascular calcification. 1. No evidence of acute pelvic or hip fracture. Diffuse osteopenia. Degenerative changes in the lower lumbar spine, SI joints and pubic symphysis. 2. Probable colonic diverticulum adjacent to the cecum with circumferential wall thickening. Findings may be related to a chronic infectious or inflammatory process although neoplasm is a possibility. Colonic diverticulosis throughout the remainder of the visualized colon with no acute features. Xr Chest Portable    Result Date: 4/13/2021  EXAMINATION: ONE XRAY VIEW OF THE CHEST 4/13/2021 1:19 pm COMPARISON: None.  HISTORY: ORDERING SYSTEM PROVIDED HISTORY: SOB TECHNOLOGIST PROVIDED HISTORY: Reason for exam:->SOB Reason for Exam: Fall (pt family member states pt with multiple recent falls- states lives by herself, but daughter lives close by and helps her- daughter states she is unable to take care of her full time would possibly like nursing home placement) Acuity: Acute Type of Exam: Initial FINDINGS: The cardiomediastinal silhouette is borderline in size. The lungs are clear. No infiltrate, pleural fluid or evidence of overt failure. Osseous structures are intact     No acute cardiopulmonary disease. Assessment and Plan:  Principal Problem:    Late onset Alzheimer's disease without behavioral disturbance ([address]  Plan: needs SNF, possible LTC afterwards  Active Problems:    Diabetes mellitus type 2, controlled ([address] -Established problem. Stable. FSBS 77 this am  Plan: cont ccc diet, home meds, sliding scale    Pure hypercholesterolemia -Established problem. Stable. Plan: Continue present orders/plan. Diabetic neuropathy ([address] -. Hypertension -Established problem. Stable.   130/73 this amm  Plan: cont same meds    Failure to thrive in adult    Diverticulitis large intestine  Plan: cont conservative tx as pt declines colonoscopy    Moderate malnutrition ([address]      Disp - remains stable for d/c          Rian Gomez  4/16/2021

## 2021-04-16 NOTE — PLAN OF CARE
Problem: Falls - Risk of:  Goal: Will remain free from falls  Description: Will remain free from falls  4/16/2021 1012 by Feli Grady RN  Outcome: Ongoing  4/15/2021 2300 by Chasidy Aldana RN  Outcome: Ongoing  Goal: Absence of physical injury  Description: Absence of physical injury  4/16/2021 1012 by Feli Grady RN  Outcome: Ongoing  4/15/2021 2300 by Chasidy Aldana RN  Outcome: Ongoing     Problem: Skin Integrity:  Goal: Will show no infection signs and symptoms  Description: Will show no infection signs and symptoms  4/16/2021 1012 by Feli Grady RN  Outcome: Ongoing  4/15/2021 2300 by Chasidy Aldana RN  Outcome: Ongoing  Goal: Absence of new skin breakdown  Description: Absence of new skin breakdown  4/16/2021 1012 by Feli Grady RN  Outcome: Ongoing  4/15/2021 2300 by Chasidy Aldana RN  Outcome: Ongoing     Problem: Nutrition  Goal: Optimal nutrition therapy  4/16/2021 1012 by Feli Grady RN  Outcome: Ongoing  4/15/2021 2300 by Chasidy Aldana RN  Outcome: Ongoing     Problem: Pain:  Goal: Pain level will decrease  Description: Pain level will decrease  4/16/2021 1012 by Feli Grady RN  Outcome: Ongoing  4/15/2021 2300 by Chasidy Aldana RN  Outcome: Ongoing  Goal: Control of acute pain  Description: Control of acute pain  4/16/2021 1012 by Feli Grady RN  Outcome: Ongoing  4/15/2021 2300 by Chasidy Aldana RN  Outcome: Ongoing  Goal: Control of chronic pain  Description: Control of chronic pain  4/16/2021 1012 by Feli Grady RN  Outcome: Ongoing  4/15/2021 2300 by Chasidy Aldana RN  Outcome: Ongoing

## 2021-04-16 NOTE — PROGRESS NOTES
Physical Therapy  Facility/Department: [address] ORTHO/NEURO NURSING  Daily Treatment Note  NAME: Van Davies  : 1931  MRN: [id number]    Date of Service: 2021    Discharge Recommendations: Van Davies scored a 7/24 on the AM-PAC short mobility form. Current research shows that an AM-PAC score of 17 or less is typically not associated with a discharge to the patient's home setting. Based on the patient's AM-PAC score and their current functional mobility deficits, it is recommended that the patient have 3-5 sessions per week of Physical Therapy at d/c to increase the patient's independence. Please see assessment section for further patient specific details. If patient discharges prior to next session this note will serve as a discharge summary. Please see below for the latest assessment towards goals. PT Equipment Recommendations  Equipment Needed: No  Other: defer to next level of care    Assessment   Body structures, Functions, Activity limitations: Decreased functional mobility ; Decreased endurance;Decreased ADL status; Decreased strength;Decreased balance; Increased pain  Assessment: Pt able to initiate bed mobility this date however continues to require Max Ax2 for sup>sit and transfers. Pt continues to present with posterior lean in standing, limiting safe transfers this date. Pt would benefit from continued skilled PT services to address deficits.   Treatment Diagnosis: debility  Prognosis: Fair  Decision Making: Medium Complexity  Clinical Presentation: stable  PT Education: Goals;PT Role;Plan of Care;Transfer Training;Orientation;General Safety  Patient Education: d/c recommendations--needs reinforcement of education  Barriers to Learning: cognition  REQUIRES PT FOLLOW UP: Yes  Activity Tolerance  Activity Tolerance: Patient limited by fatigue;Patient limited by endurance  Activity Tolerance: required increased time to perform all tasks this date     Patient Diagnosis(es): The primary encounter diagnosis was Frequent falls. A diagnosis of Elevated troponin was also pertinent to this visit. has a past medical history of Anxiety, Diabetic neuropathy ([address] Dysphagia, Gait disorder, Hyperlipidemia, Hypertension, Meningioma ([address] Poor memory, TIA (transient ischemic attack), and Type II or unspecified type diabetes mellitus without mention of complication, not stated as uncontrolled. has a past surgical history that includes Cholecystectomy; hysteroscopy; lipoma resection; and Colonoscopy. Restrictions  Restrictions/Precautions  Restrictions/Precautions: Fall Risk, Up as Tolerated, Modified Diet(High fall risk, carb control, up as tolerated)  Position Activity Restriction  Other position/activity restrictions: Lan Penny is a 80 y.o. female who presents to the emergency department today with her daughter for evaluation for frequent falls. The daughter states that the patient lives at home, she states that she does live across the street. The daughter states that she is with her 6 to 8 hours/day, but essentially the patient lives alone. The daughter states that over the past 3 days the patient has had increasing fatigue, and she states that she has had multiple falls. Subjective   General  Chart Reviewed: Yes  Response To Previous Treatment: Patient with no complaints from previous session. Family / Caregiver Present: No  Subjective  Subjective: Pt agreeable to PT/OT session, reporting no pain at this time.   General Comment  Comments: Pt supine in bed upon arrival.  Pain Screening  Patient Currently in Pain: Denies  Vital Signs  Patient Currently in Pain: Denies       Orientation  Orientation  Overall Orientation Status: Impaired  Orientation Level: Oriented to place;Oriented to time;Oriented to person;Disoriented to situation     Cognition   Cognition  Overall Cognitive Status: Exceptions  Arousal/Alertness: Inconsistent responses to stimuli  Following Commands: Inconsistently follows commands  Attention Span: Difficulty attending to directions; Difficulty dividing attention  Memory: Decreased recall of biographical Information;Decreased short term memory;Decreased recall of recent events  Safety Judgement: Decreased awareness of need for safety  Problem Solving: Decreased awareness of errors  Insights: Decreased awareness of deficits  Initiation: Requires cues for all  Sequencing: Requires cues for all     Objective   Bed mobility  Supine to Sit: Dependent/Total(Max Ax2, pt able to initiate advancing BLE towards EOB however required significantly increased time to perform)  Scooting: Dependent/Total(Max Ax2)  Transfers  Sit to Stand: Dependent/Total  Stand to sit: Dependent/Total  Bed to Chair: Dependent/Total  Stand Pivot Transfers: Dependent/Total  Comment: Max Ax2 pivot EOB>chair with pt noted to be going into extensive extension of BLE and trunk despite VC for anterior weight shift in sitting to assist with transfer. Ambulation  Ambulation?: No  Stairs/Curb  Stairs?: No     Balance  Posture: Poor  Sitting - Static: Fair  Sitting - Dynamic: Poor(Brief periods of Max Ax1 with mostly SBA to maintain balance at EOB ~20-25 minutes total)  Standing - Static: Poor  Standing - Dynamic: Poor(Max Ax2 for transfers)       Comment: Pt sat EOB ~20-25 minutes total for attempting feeding and gown change.               G-Code     OutComes Score                   AM-PAC Score  AM-PAC Inpatient Mobility Raw Score : 7 (04/16/21 1432)  AM-PAC Inpatient T-Scale Score : 26.42 (04/16/21 1432)  Mobility Inpatient CMS 0-100% Score: 92.36 (04/16/21 1432)  Mobility Inpatient CMS G-Code Modifier : CM (04/16/21 1432)          Goals  Short term goals  Time Frame for Short term goals: to be met by discharge  Short term goal 1: pt able to perform bed mobility with minimal assistance  Short term goal 2: pt able to perform STS transfers with moderate assistance  Short term goal 3: pt able to perform bed to chair transfer with max A of 1  Patient Goals   Patient goals : did not state  NO GOALS MET THIS DATE    Plan    Plan  Times per week: 3-5x/wk  Current Treatment Recommendations: Strengthening, Balance Training, Functional Mobility Training, Endurance Training, Transfer Training, Neuromuscular Re-education, Positioning, Modalities, Equipment Evaluation, Education, & procurement, Safety Education & Training, Patient/Caregiver Education & Training, Home Exercise Program  Safety Devices  Type of devices:  All fall risk precautions in place, Call light within reach, Chair alarm in place, Nurse notified, Left in chair, Telesitter in use, Gait belt, Patient at risk for falls  Restraints  Initially in place: No     Therapy Time   Individual Concurrent Group Co-treatment   Time In       1327   Time Out       1405   Minutes       38        Timed Code Treatment Minutes:  38  Total Treatment Minutes:  [address]EC[address], [address], [address] [facility]

## 2021-04-16 NOTE — PROGRESS NOTES
Shift assessment completed, pt is alert in bed, VSS, fall precautions in place, call light within reach, denies and doesn't appear to be in any pain. See flowsheets and MAR, will monitor pt. The care plan and education has been reviewed and mutually agreed upon with the patient.

## 2021-04-16 NOTE — CARE COORDINATION
DISCHARGE SUMMARY     DATE OF DISCHARGE: 04/16/21    DISCHARGE DESTINATION: [place]    Level of Care: Skilled    Report Number: [phone]    Fax Number:  [phone]    Precert Obtained: yes    Hens Completed: yes    PASARR: N/A    Notified: RN, Family and Facility/Agency-daughter and facility aware of transport time    Prescriptions Faxed:N/A    TRANSPORTATION: [place] [address] Name: [address] [place] [address] up Time: 830PM    Phone Number: [phone]    Electronically signed by OCTAVIA Santana on 4/16/2021 at 3:14 PM

## 2021-04-16 NOTE — PROGRESS NOTES
Comprehensive Nutrition Assessment    Type and Reason for Visit:  Reassess    Nutrition Recommendations/Plan:   1. Trial Boost pudding and magic cup     Nutrition Assessment:  Pt's nutrition status is declining as evidenced by PO intake less than 25% of meals on 4 CCC diet. Pt with increased nutrient needs r/t presence of multiple wounds; has Glucerna ordered but is only taking sips of it. Will offer Boost pudding and magic cup instead. Weight 126 lbs noted; appears weight has been stable per hx in EMR. Will monitor for improved PO intake and tolerance to supplement. Malnutrition Assessment:  Malnutrition Status: Moderate malnutrition    Context:  Chronic Illness     Findings of the 6 clinical characteristics of malnutrition:  Energy Intake:  No significant decrease in energy intake(per pt)  Weight Loss:  Unable to assess     Body Fat Loss:  1 - Mild body fat loss Triceps   Muscle Mass Loss:  7 - Severe muscle mass loss Clavicles (pectoralis & deltoids)  Fluid Accumulation:  No significant fluid accumulation     Strength:  Not Performed    Estimated Daily Nutrient Needs:  Energy (kcal):  7491-2055; Weight Used for Energy Requirements:  Current(57 kg)     Protein (g):  68-86 grams; Weight Used for Protein Requirements:  Current(57 kg; 1.2-1.5 grams per kg)        Fluid (ml/day):   ; Method Used for Fluid Requirements:  1 ml/kcal      Nutrition Related Findings:  LBM 4/13. +1 pitting BLE edema. +2.8 liters. Wounds:  Multiple       Current Nutrition Therapies:    DIET CARB CONTROL; Dietary Nutrition Supplements: Diabetic Oral Supplement    Anthropometric Measures:  · Height: 5' 6\" (167.6 cm)  · Current Body Weight: 126 lb (57.2 kg)   · Ideal Body Weight: 130 lbs; % Ideal Body Weight 98.5 %   · BMI: 20.3  · BMI Categories: Normal Weight (BMI 18.5-24. 9)       Nutrition Diagnosis:   · Increased nutrient needs related to increase demand for energy/nutrients as evidenced by wounds    · Inadequate oral intake related to inadequate protein-energy intake as evidenced by intake 0-25%, intake 26-50%      Nutrition Interventions:   Food and/or Nutrient Delivery:  Continue Current Diet, Modify Oral Nutrition Supplement  Nutrition Education/Counseling:  Education not indicated   Coordination of Nutrition Care:  Continue to monitor while inpatient    Goals:  PO intake 50% or greater of meals & supplement       Nutrition Monitoring and Evaluation:   Behavioral-Environmental Outcomes:  None Identified   Food/Nutrient Intake Outcomes:  Food and Nutrient Intake, Supplement Intake  Physical Signs/Symptoms Outcomes:  Skin     Discharge Planning:    Continue Oral Nutrition Supplement     Electronically signed by Eliza Castillo RD, LD on 4/16/21 at 2:21 PM EDT    Contact: [phone]

## 2021-04-17 LAB
BLOOD CULTURE, ROUTINE: NORMAL
CULTURE, BLOOD 2: NORMAL
SARS-COV-2: NOT DETECTED

## 2021-04-24 ENCOUNTER — APPOINTMENT (OUTPATIENT)
Dept: GENERAL RADIOLOGY | Age: 86
End: 2021-04-24
Payer: MEDICARE

## 2021-04-24 ENCOUNTER — HOSPITAL ENCOUNTER (OUTPATIENT)
Age: 86
Setting detail: OBSERVATION
Discharge: NURSING, REHAB OR LONG-TERM CARE | End: 2021-04-27
Attending: EMERGENCY MEDICINE | Admitting: INTERNAL MEDICINE
Payer: MEDICARE

## 2021-04-24 ENCOUNTER — APPOINTMENT (OUTPATIENT)
Dept: CT IMAGING | Age: 86
End: 2021-04-24
Payer: MEDICARE
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
## 2021-04-24 DIAGNOSIS — R77.8 ELEVATED TROPONIN: ICD-10-CM

## 2021-04-24 PROBLEM — R55 SYNCOPE AND COLLAPSE: Status: ACTIVE | Noted: 2021-04-24

## 2021-04-24 PROBLEM — K57.32 DIVERTICULITIS LARGE INTESTINE: Status: RESOLVED | Noted: 2021-04-14 | Resolved: 2021-04-24

## 2021-04-24 PROBLEM — M17.12 LEFT KNEE DJD: Status: RESOLVED | Noted: 2019-08-26 | Resolved: 2021-04-24

## 2021-04-24 LAB
A/G RATIO: 0.8 (ref 1.1–2.2)
ALBUMIN SERPL-MCNC: 3 G/DL (ref 3.4–5)
ALP BLD-CCNC: 73 U/L (ref 40–129)
ALT SERPL-CCNC: 20 U/L (ref 10–40)
ANION GAP SERPL CALCULATED.3IONS-SCNC: 5 MMOL/L (ref 3–16)
AST SERPL-CCNC: 27 U/L (ref 15–37)
BASOPHILS ABSOLUTE: 0.1 K/UL (ref 0–0.2)
BASOPHILS RELATIVE PERCENT: 0.7 %
BILIRUB SERPL-MCNC: 0.3 MG/DL (ref 0–1)
BILIRUBIN URINE: NEGATIVE
BLOOD, URINE: NEGATIVE
BUN BLDV-MCNC: 19 MG/DL (ref 7–20)
CALCIUM SERPL-MCNC: 9.9 MG/DL (ref 8.3–10.6)
CHLORIDE BLD-SCNC: 105 MMOL/L (ref 99–110)
CLARITY: CLEAR
CO2: 29 MMOL/L (ref 21–32)
COLOR: YELLOW
CREAT SERPL-MCNC: 0.7 MG/DL (ref 0.6–1.2)
EOSINOPHILS ABSOLUTE: 0.1 K/UL (ref 0–0.6)
EOSINOPHILS RELATIVE PERCENT: 1.7 %
GFR AFRICAN AMERICAN: >60
GFR NON-AFRICAN AMERICAN: >60
GLOBULIN: 4 G/DL
GLUCOSE BLD-MCNC: 140 MG/DL (ref 70–99)
GLUCOSE BLD-MCNC: 94 MG/DL (ref 70–99)
GLUCOSE URINE: NEGATIVE MG/DL
HCT VFR BLD CALC: 32.9 % (ref 36–48)
HEMOGLOBIN: 10.1 G/DL (ref 12–16)
KETONES, URINE: NEGATIVE MG/DL
LEUKOCYTE ESTERASE, URINE: NEGATIVE
LYMPHOCYTES ABSOLUTE: 1.9 K/UL (ref 1–5.1)
LYMPHOCYTES RELATIVE PERCENT: 21.4 %
MCH RBC QN AUTO: 29.6 PG (ref 26–34)
MCHC RBC AUTO-ENTMCNC: 30.6 G/DL (ref 31–36)
MCV RBC AUTO: 96.5 FL (ref 80–100)
MICROSCOPIC EXAMINATION: NORMAL
MONOCYTES ABSOLUTE: 0.6 K/UL (ref 0–1.3)
MONOCYTES RELATIVE PERCENT: 6.7 %
NEUTROPHILS ABSOLUTE: 6 K/UL (ref 1.7–7.7)
NEUTROPHILS RELATIVE PERCENT: 69.5 %
NITRITE, URINE: NEGATIVE
PDW BLD-RTO: 17 % (ref 12.4–15.4)
PERFORMED ON: ABNORMAL
PH UA: 7 (ref 5–8)
PLATELET # BLD: 283 K/UL (ref 135–450)
PMV BLD AUTO: 8 FL (ref 5–10.5)
POTASSIUM REFLEX MAGNESIUM: 5 MMOL/L (ref 3.5–5.1)
PROTEIN UA: NEGATIVE MG/DL
RBC # BLD: 3.41 M/UL (ref 4–5.2)
SODIUM BLD-SCNC: 139 MMOL/L (ref 136–145)
SPECIFIC GRAVITY UA: 1.01 (ref 1–1.03)
TOTAL PROTEIN: 7 G/DL (ref 6.4–8.2)
TROPONIN: 0.04 NG/ML
TROPONIN: 0.05 NG/ML
TROPONIN: 0.05 NG/ML
URINE REFLEX TO CULTURE: NORMAL
URINE TYPE: NORMAL
UROBILINOGEN, URINE: 0.2 E.U./DL
WBC # BLD: 8.7 K/UL (ref 4–11)

## 2021-04-24 PROCEDURE — 72125 CT NECK SPINE W/O DYE: CPT

## 2021-04-24 PROCEDURE — 84484 ASSAY OF TROPONIN QUANT: CPT

## 2021-04-24 PROCEDURE — 36415 COLL VENOUS BLD VENIPUNCTURE: CPT

## 2021-04-24 PROCEDURE — 99284 EMERGENCY DEPT VISIT MOD MDM: CPT

## 2021-04-24 PROCEDURE — G0378 HOSPITAL OBSERVATION PER HR: HCPCS

## 2021-04-24 PROCEDURE — 6370000000 HC RX 637 (ALT 250 FOR IP): Performed by: INTERNAL MEDICINE

## 2021-04-24 PROCEDURE — 96372 THER/PROPH/DIAG INJ SC/IM: CPT

## 2021-04-24 PROCEDURE — 80053 COMPREHEN METABOLIC PANEL: CPT

## 2021-04-24 PROCEDURE — 6360000002 HC RX W HCPCS: Performed by: INTERNAL MEDICINE

## 2021-04-24 PROCEDURE — 72190 X-RAY EXAM OF PELVIS: CPT

## 2021-04-24 PROCEDURE — 93005 ELECTROCARDIOGRAM TRACING: CPT | Performed by: EMERGENCY MEDICINE

## 2021-04-24 PROCEDURE — 81003 URINALYSIS AUTO W/O SCOPE: CPT

## 2021-04-24 PROCEDURE — 70450 CT HEAD/BRAIN W/O DYE: CPT

## 2021-04-24 PROCEDURE — 85025 COMPLETE CBC W/AUTO DIFF WBC: CPT

## 2021-04-24 RX ORDER — METOPROLOL SUCCINATE 25 MG/1
25 TABLET, EXTENDED RELEASE ORAL DAILY
Status: DISCONTINUED | OUTPATIENT
Start: 2021-04-24 | End: 2021-04-27 | Stop reason: HOSPADM

## 2021-04-24 RX ORDER — CLOPIDOGREL BISULFATE 75 MG/1
75 TABLET ORAL DAILY
Status: DISCONTINUED | OUTPATIENT
Start: 2021-04-24 | End: 2021-04-27 | Stop reason: HOSPADM

## 2021-04-24 RX ORDER — PRAVASTATIN SODIUM 40 MG
40 TABLET ORAL NIGHTLY
Status: DISCONTINUED | OUTPATIENT
Start: 2021-04-24 | End: 2021-04-27 | Stop reason: HOSPADM

## 2021-04-24 RX ORDER — TRIAMCINOLONE ACETONIDE 1 MG/G
CREAM TOPICAL 2 TIMES DAILY
Status: DISCONTINUED | OUTPATIENT
Start: 2021-04-24 | End: 2021-04-27 | Stop reason: HOSPADM

## 2021-04-24 RX ORDER — DONEPEZIL HYDROCHLORIDE 5 MG/1
10 TABLET, FILM COATED ORAL NIGHTLY
Status: DISCONTINUED | OUTPATIENT
Start: 2021-04-24 | End: 2021-04-27 | Stop reason: HOSPADM

## 2021-04-24 RX ORDER — HYDROCHLOROTHIAZIDE 25 MG/1
12.5 TABLET ORAL DAILY
Status: DISCONTINUED | OUTPATIENT
Start: 2021-04-24 | End: 2021-04-27 | Stop reason: HOSPADM

## 2021-04-24 RX ORDER — GABAPENTIN 100 MG/1
100 CAPSULE ORAL 2 TIMES DAILY
Status: DISCONTINUED | OUTPATIENT
Start: 2021-04-24 | End: 2021-04-27 | Stop reason: HOSPADM

## 2021-04-24 RX ORDER — LISINOPRIL 10 MG/1
10 TABLET ORAL 2 TIMES DAILY
Status: DISCONTINUED | OUTPATIENT
Start: 2021-04-24 | End: 2021-04-27 | Stop reason: HOSPADM

## 2021-04-24 RX ORDER — ASPIRIN 81 MG/1
81 TABLET, CHEWABLE ORAL DAILY
Status: DISCONTINUED | OUTPATIENT
Start: 2021-04-24 | End: 2021-04-27 | Stop reason: HOSPADM

## 2021-04-24 RX ORDER — GENTAMICIN SULFATE 1 MG/G
OINTMENT TOPICAL 2 TIMES DAILY
Status: DISCONTINUED | OUTPATIENT
Start: 2021-04-24 | End: 2021-04-24

## 2021-04-24 RX ADMIN — METOPROLOL SUCCINATE 25 MG: 25 TABLET, EXTENDED RELEASE ORAL at 17:08

## 2021-04-24 RX ADMIN — LISINOPRIL 10 MG: 10 TABLET ORAL at 22:26

## 2021-04-24 RX ADMIN — METFORMIN HYDROCHLORIDE 500 MG: 500 TABLET ORAL at 17:08

## 2021-04-24 RX ADMIN — HYDROCHLOROTHIAZIDE 12.5 MG: 25 TABLET ORAL at 17:08

## 2021-04-24 RX ADMIN — ENOXAPARIN SODIUM 30 MG: 30 INJECTION SUBCUTANEOUS at 17:14

## 2021-04-24 RX ADMIN — PRAVASTATIN SODIUM 40 MG: 40 TABLET ORAL at 22:26

## 2021-04-24 RX ADMIN — GABAPENTIN 100 MG: 100 CAPSULE ORAL at 22:26

## 2021-04-24 RX ADMIN — DONEPEZIL HYDROCHLORIDE 10 MG: 5 TABLET, FILM COATED ORAL at 22:26

## 2021-04-24 RX ADMIN — ASPIRIN 81 MG: 81 TABLET, CHEWABLE ORAL at 17:08

## 2021-04-24 RX ADMIN — CLOPIDOGREL BISULFATE 75 MG: 75 TABLET ORAL at 17:08

## 2021-04-24 ASSESSMENT — PAIN SCALES - GENERAL
PAINLEVEL_OUTOF10: 0
PAINLEVEL_OUTOF10: 0

## 2021-04-24 NOTE — ED PROVIDER NOTES
[address] [facility]  EMERGENCY DEPARTMENTENCOUNTER      Pt Name: Kalie Jameson  MRN: [id number]  [place] 1/16/1931  Date ofevaluation: 4/24/2021  Provider: All Sparks MD    CHIEF COMPLAINT       Chief Complaint   Patient presents with    Fall     pt coming from [facility] via ems, [place] found pt on floor. pt has hx of dementia no obvious deformity. HPI    HISTORY OF PRESENT ILLNESS   (Location/Symptom, Timing/Onset,Context/Setting, Quality, Duration, Modifying Factors, Severity)  Note limiting factors. Kalie Jameson is a 80 y.o. female who presents to the emergency department after falling. This is a 68-year-old female with a history of dementia who apparently fell onto the floor at her extended care facility. Is unclear exactly when this happened but we were told this morning by the ECF. The patient has dementia and told me that she fell yesterday. The fall was unwitnessed. It is unclear whether there was a syncopal episode or head injury. The patient has a history of a ulcer on her right hip. NursingNotes were reviewed. Review of Systems    REVIEW OF SYSTEMS    (2-9 systems for level 4, 10 or more for level 5)     Review of Systems   Constitutional: Negative for fever. HENT: Negative for rhinorrhea and sore throat. Eyes: Negative for redness. Respiratory: Negative for shortness of breath. Cardiovascular: Negative for chest pain. Gastrointestinal: Negative for abdominal pain. Genitourinary: Negative for flank pain. Neurological: Negative for headaches. Hematological: Negative for adenopathy. Psychiatric/Behavioral: Negative for confusion. Except as noted above the remainder of the review of systems was reviewed and negative.        PAST MEDICAL HISTORY     Past Medical History:   Diagnosis Date    Anxiety     Diabetic neuropathy ([facility]     Dysphagia     Gait disorder     Hyperlipidemia     Hypertension  M[facility]     Poor memory     TIA (transient ischemic attack)     Type II or unspecified type diabetes mellitus without mention of complication, not stated as uncontrolled          SURGICALHISTORY       Past Surgical History:   Procedure Laterality Date    CHOLECYSTECTOMY      COLONOSCOPY      HYSTEROSCOPY      LIPOMA RESECTION           CURRENT MEDICATIONS       Previous Medications    ASPIRIN 81 MG EC TABLET    Take 81 mg by mouth daily. CLOPIDOGREL (PLAVIX) 75 MG TABLET    TAKE ONE TABLET BY MOUTH DAILY    DONEPEZIL (ARICEPT) 10 MG TABLET    TAKE 1 TABLET BY MOUTH EVERY NIGHT AT BEDTIME    GABAPENTIN (NEURONTIN) 100 MG CAPSULE    TAKE ONE CAPSULE BY MOUTH THREE TIMES A DAY    GENTAMICIN (GARAMYCIN) 0.1 % OINTMENT    Apply topically 2 times daily     HYDROCHLOROTHIAZIDE (MICROZIDE) 12.5 MG CAPSULE    TAKE ONE CAPSULE BY MOUTH EVERY MORNING    LANCETS (ONETOUCH DELICA PLUS [id number]) MISC    TEST ONCE DAILY AS DIRECTED    LISINOPRIL (PRINIVIL;ZESTRIL) 10 MG TABLET    TAKE 1 TABLET BY MOUTH TWICE DAILY    METFORMIN (GLUCOPHAGE) 500 MG TABLET    TAKE 1 TABLET BY MOUTH TWICE DAILY WITH MEALS    METOPROLOL SUCCINATE (TOPROL XL) 25 MG EXTENDED RELEASE TABLET    TAKE ONE TABLET BY MOUTH DAILY    PRAVASTATIN (PRAVACHOL) 40 MG TABLET    TAKE ONE TABLET BY MOUTH DAILY    TRIAMCINOLONE (KENALOG) 0.1 % CREAM    Apply topically 2 times daily        ALLERGIES     Patient has no known allergies. FAMILY HISTORY       Family History   Problem Relation Age of Onset    Cancer Mother     Cancer Father     Diabetes Maternal Aunt     Diabetes Other           SOCIAL HISTORY       Social History     Socioeconomic History    Marital status:       Spouse name: Joann Friday Number of children: 3    Years of education: None    Highest education level: None   Occupational History    Occupation: retired     Comment: homemaker   Social Needs    Financial resource strain: None    Food insecurity     Worry: None     Inability: None    Transportation needs     Medical: None     Non-medical: None   Tobacco Use    Smoking status: Never Smoker    Smokeless tobacco: Never Used    Tobacco comment: has been around lots of second hand smoke (mother)   Substance and Sexual Activity    Alcohol use: No    Drug use: No    Sexual activity: Never   Lifestyle    Physical activity     Days per week: None     Minutes per session: None    Stress: None   Relationships    Social connections     Talks on phone: None     Gets together: None     Attends Jehovah's witness service: None     Active member of club or organization: None     Attends meetings of clubs or organizations: None     Relationship status: None    Intimate partner violence     Fear of current or ex partner: None     Emotionally abused: None     Physically abused: None     Forced sexual activity: None   Other Topics Concern    None   Social History Narrative    None       SCREENINGS             PHYSICAL EXAM    (up to 7 for level 4, 8 or more for level 5)     ED Triage Vitals [04/24/21 0924]   BP Temp Temp Source Pulse Resp SpO2 Height Weight   113/76 97.6 °F (36.4 °C) Temporal 89 12 100 % 5' 4\" (1.626 m) 126 lb (57.2 kg)       Physical Exam:      General Appearance:  Alert, cooperative, appears stated age. Head:  Normocephalic, without obvious abnormality, atraumatic. Eyes:  conjunctiva/corneas clear, EOM's intact. Sclera anicteric. ENT:  Mucous remains moist and pink   Neck: Supple, symmetrical, trachea midline, no adenopathy. No jugular venous distention. Lungs:    Clear to auscultation bilaterally   Chest Wall:   No pain to palpation   Heart:   Genitourinary:  Regular rate rhythm with no murmurs rubs gallops  Unremarkable   Abdomen:    Soft and benign. No guarding rebound. Extremities:  She has small quarter size lesion on her right lateral hip area. No signs of infection. No cellulitis. She can lift her heels off the bed without difficulty.   No deformities noted. Pulses:  Good throughout   Skin:  No rashes or lesions to exposed skin. Neurologic: Alert and oriented X 2 but not to time. DIAGNOSTIC RESULTS     EKG: All EKG's are interpreted by the Emergency Department Physician who either signs or Co-signsthis chart in the absence of a cardiologist.    Sinus tachycardia at a rate of 113 beats a minute with no acute ST elevations or depressions or pathologic Q waves. RADIOLOGY:   Non-plain filmimages such as CT, Ultrasound and MRI are read by the radiologist. Plain radiographic images are visualized and preliminarily interpreted by the emergency physician with the below findings:    See below    Interpretation per the Radiologist below, if available at the time ofthis note: All incidental findings were discussed with the patient. XR PELVIS (MIN 3 VIEWS)   Final Result   No acute osseous abnormality. CT CERVICAL SPINE WO CONTRAST   Final Result   Chronic findings in the brain without acute CT abnormality identified. No acute osseous abnormality identified in the cervical spine. CT Head WO Contrast   Final Result   Chronic findings in the brain without acute CT abnormality identified. No acute osseous abnormality identified in the cervical spine.                ED BEDSIDE ULTRASOUND:   Performed by ED Physician - none    LABS:  Labs Reviewed   CBC WITH AUTO DIFFERENTIAL - Abnormal; Notable for the following components:       Result Value    RBC 3.41 (*)     Hemoglobin 10.1 (*)     Hematocrit 32.9 (*)     MCHC 30.6 (*)     RDW 17.0 (*)     All other components within normal limits    Narrative:     Performed at:  [facility] [address], [place], [address]   Phone [phone]   COMPREHENSIVE METABOLIC PANEL W/ REFLEX TO MG FOR LOW K - Abnormal; Notable for the following components:    Albumin 3.0 (*)     Albumin/Globulin Ratio 0.8 (*)     All other components within normal limits    Narrative:     Performed at:  [facility]  [address] [facility], [address]   Phone [phone]   TROPONIN - Abnormal; Notable for the following components:    Troponin 0.05 (*)     All other components within normal limits    Narrative:     Performed at:  [facility]  [address] [facility], [address]   Phone [phone]   URINE RT REFLEX TO CULTURE    Narrative:     Performed at:  [facility]  [address] [facility], [address]   Phone [phone]       All other labs were within normal range or not returned as of this dictation. EMERGENCY DEPARTMENT COURSE and DIFFERENTIAL DIAGNOSIS/MDM:   Vitals:    Vitals:    04/24/21 1015 04/24/21 1030 04/24/21 1200 04/24/21 1300   BP:  124/60     Pulse: 111 111 101 115   Resp: 18 15 15 15   Temp:       TempSrc:       SpO2:   98% 95%   Weight:       Height:               MDM    Patient has remained stable through the hospital course. Her work-up included a troponin that was elevated. Her EKG was unremarkable. A CT of the patient's head and neck were obtained and are unremarkable normal and x-rays of her pelvis were unremarkable normal.  Given the patient's possible syncopal episode and elevated troponin I have recommended admission for further care and observation. Dr. Eneida Lubin is graciously admitted the patient. REASSESSMENT              CONSULTS:  IP CONSULT TO INTERNAL MEDICINE    PROCEDURES:  Unless otherwise noted below, none     Procedures    FINAL IMPRESSION      1. Fall from bed, initial encounter    2. Elevated troponin          DISPOSITION/PLAN   DISPOSITION Admitted 04/24/2021 12:33:34 PM      PATIENT REFERREDTO:  No follow-up provider specified. DISCHARGEMEDICATIONS:  New Prescriptions    No medications on file     Controlled Substances Monitoring:     No flowsheet data found.     (Please note that portions of this note were

## 2021-04-24 NOTE — ED TRIAGE NOTES
Pt coming in via ems from [facility] per report pt was found on floor by staff. Unsure if pt fell. No s/s of injury noted. Pt has pressure ulcer on rt hip.   Pt is alert to self only

## 2021-04-24 NOTE — ED NOTES
Blood work and urine sent to lab, daughter at bedside     [facility]  04/24/21 2380

## 2021-04-24 NOTE — ED NOTES
Bed: 20  Expected date:   Expected time:   Means of arrival: [facility]  Comments:  [address]  04/24/21 2192

## 2021-04-24 NOTE — PLAN OF CARE
Problem: Falls - Risk of:  Goal: Will remain free from falls  Description: Will remain free from falls  Outcome: Not Met This Shift  Goal: Absence of physical injury  Description: Absence of physical injury  Outcome: Not Met This Shift     Problem: Skin Integrity:  Goal: Will show no infection signs and symptoms  Description: Will show no infection signs and symptoms  Outcome: Not Met This Shift  Goal: Absence of new skin breakdown  Description: Absence of new skin breakdown  Outcome: Not Met This Shift

## 2021-04-24 NOTE — ED NOTES
Bed: 12  Expected date:   Expected time:   Means of arrival:   Comments:  [address] RN  04/24/21 0494

## 2021-04-24 NOTE — PROGRESS NOTES
Pt brief  Checked for incontinence at this time. Brief and pads dry. Medications administered per MAR. Daughter left for the evening. Pt repositioned in bed for comfort.

## 2021-04-25 LAB
ANION GAP SERPL CALCULATED.3IONS-SCNC: 5 MMOL/L (ref 3–16)
BUN BLDV-MCNC: 15 MG/DL (ref 7–20)
CALCIUM SERPL-MCNC: 9.5 MG/DL (ref 8.3–10.6)
CHLORIDE BLD-SCNC: 108 MMOL/L (ref 99–110)
CO2: 31 MMOL/L (ref 21–32)
CREAT SERPL-MCNC: 0.8 MG/DL (ref 0.6–1.2)
EKG ATRIAL RATE: 131 BPM
EKG DIAGNOSIS: NORMAL
EKG P AXIS: 34 DEGREES
EKG P-R INTERVAL: 112 MS
EKG Q-T INTERVAL: 334 MS
EKG QRS DURATION: 70 MS
EKG QTC CALCULATION (BAZETT): 458 MS
EKG R AXIS: 34 DEGREES
EKG T AXIS: 13 DEGREES
EKG VENTRICULAR RATE: 113 BPM
GFR AFRICAN AMERICAN: >60
GFR NON-AFRICAN AMERICAN: >60
GLUCOSE BLD-MCNC: 85 MG/DL (ref 70–99)
POTASSIUM SERPL-SCNC: 4.7 MMOL/L (ref 3.5–5.1)
SODIUM BLD-SCNC: 144 MMOL/L (ref 136–145)

## 2021-04-25 PROCEDURE — G0378 HOSPITAL OBSERVATION PER HR: HCPCS

## 2021-04-25 PROCEDURE — 36415 COLL VENOUS BLD VENIPUNCTURE: CPT

## 2021-04-25 PROCEDURE — 92610 EVALUATE SWALLOWING FUNCTION: CPT

## 2021-04-25 PROCEDURE — 6360000002 HC RX W HCPCS: Performed by: INTERNAL MEDICINE

## 2021-04-25 PROCEDURE — 93010 ELECTROCARDIOGRAM REPORT: CPT | Performed by: INTERNAL MEDICINE

## 2021-04-25 PROCEDURE — 97162 PT EVAL MOD COMPLEX 30 MIN: CPT

## 2021-04-25 PROCEDURE — 80048 BASIC METABOLIC PNL TOTAL CA: CPT

## 2021-04-25 PROCEDURE — 96372 THER/PROPH/DIAG INJ SC/IM: CPT

## 2021-04-25 PROCEDURE — 92526 ORAL FUNCTION THERAPY: CPT

## 2021-04-25 PROCEDURE — 6370000000 HC RX 637 (ALT 250 FOR IP): Performed by: INTERNAL MEDICINE

## 2021-04-25 PROCEDURE — 97166 OT EVAL MOD COMPLEX 45 MIN: CPT

## 2021-04-25 RX ADMIN — TRIAMCINOLONE ACETONIDE: 1 CREAM TOPICAL at 21:10

## 2021-04-25 RX ADMIN — GABAPENTIN 100 MG: 100 CAPSULE ORAL at 21:03

## 2021-04-25 RX ADMIN — METOPROLOL SUCCINATE 25 MG: 25 TABLET, EXTENDED RELEASE ORAL at 10:16

## 2021-04-25 RX ADMIN — CLOPIDOGREL BISULFATE 75 MG: 75 TABLET ORAL at 10:15

## 2021-04-25 RX ADMIN — ASPIRIN 81 MG: 81 TABLET, CHEWABLE ORAL at 10:15

## 2021-04-25 RX ADMIN — METFORMIN HYDROCHLORIDE 500 MG: 500 TABLET ORAL at 17:55

## 2021-04-25 RX ADMIN — HYDROCHLOROTHIAZIDE 12.5 MG: 25 TABLET ORAL at 10:16

## 2021-04-25 RX ADMIN — GABAPENTIN 100 MG: 100 CAPSULE ORAL at 10:16

## 2021-04-25 RX ADMIN — LISINOPRIL 10 MG: 10 TABLET ORAL at 10:17

## 2021-04-25 RX ADMIN — METFORMIN HYDROCHLORIDE 500 MG: 500 TABLET ORAL at 10:16

## 2021-04-25 RX ADMIN — LISINOPRIL 10 MG: 10 TABLET ORAL at 21:03

## 2021-04-25 RX ADMIN — DONEPEZIL HYDROCHLORIDE 10 MG: 5 TABLET, FILM COATED ORAL at 21:03

## 2021-04-25 RX ADMIN — PRAVASTATIN SODIUM 40 MG: 40 TABLET ORAL at 21:03

## 2021-04-25 RX ADMIN — TRIAMCINOLONE ACETONIDE: 1 CREAM TOPICAL at 10:15

## 2021-04-25 RX ADMIN — ENOXAPARIN SODIUM 30 MG: 30 INJECTION SUBCUTANEOUS at 10:17

## 2021-04-25 ASSESSMENT — PAIN SCALES - GENERAL
PAINLEVEL_OUTOF10: 0

## 2021-04-25 NOTE — PLAN OF CARE
Problem: Skin Integrity:  Goal: Will show no infection signs and symptoms  Description: Will show no infection signs and symptoms  Outcome: Ongoing  Note: No breakdown noted on this shift. Turning pt q2h and PRN. Incontinent care being done PRN. Heels elevated off of bed. Will monitor. Problem: Falls - Risk of:  Goal: Will remain free from falls  Description: Will remain free from falls  Note: Pt is wearing the fall bracelet, S.A.F.E. sign is posted outside door, and yellow blanket is on the bed. Pt informed of fall risks, verbalizes understanding, and agrees to ask for help to ambulate.   Will continue to monitor

## 2021-04-25 NOTE — PROGRESS NOTES
CLINICAL BEDSIDE SWALLOWING EVALUATION  Speech Therapy Department    Patient Name:  Isidoro Myers  :  1931  Pain level:denies   Medical Diagnosis:   Syncope and collapse [R55]    HPI: \"The patient is a 70-year-old elderly, black  American lady with an unwitnessed syncope and a fall, was brought in for  further evaluation. She arrived from [facility] via EMS. She does  have a history of dementia. No obvious deformity. She is a poor  historian and overall denied any chest pain. She was found on the floor  in the extended care facility. It is unclear what happened but we were  told this morning by the F that the patient was severely demented and  was found on the floor with an unwitnessed syncope. The patient denied  any chest pain, denied any shortness of breath. \"    Treatment Diagnosis:  Dysphagia    Impressions: SLP eval and treat orders received. Pt denies any difficulty with eating/drinking. Remote MBS completed in  revealed no aspiration/penetration with recommendations for Regular texture diet with thin liquids. Pt does not recall any recent SLP tx. Pt with prior hx of meningioma, poor memory and TIA. [facility] Pt was positioned upright in bed on RA. Oral mechanism; dentures in place, grossly intact ROM/coordination. Various consistency trials were provided to assess swallow function. Pt demonstrated prolonged mastication with regular solids and delayed oral clearing. Min diffuse lingual stasis was noted however, pt was able to clear. Pharyngeal pooling was suspected with delayed swallow initiation. No overt clinical s/s of aspiration/penetration assessed this date. Recommend continuation of current diet level with follow-up to ensure tolerance.      Dietary Recommendations:  Regular texture diet with thin liquids, meds with puree     Strategies: 90 degree positioning with all p.o. intake; small bites/sips; alternate textures through meal; reduce rate of intake    Treatment/Goals: Speech therapy for dysphagia tx 1-2x to ensure diet tolerance     ST.) Pt will tolerate recommended diet without s/s of aspiration     Oral motor Exam:  Dentition: dentures   Labial/Facial: WNL   Lingual: WNL   Voice: grossly WNL     Oral Phase:   Reduced/prolonged mastication  Reduced A-P propulsion  Delayed oral clearing   Min diffuse lingual stasis     Pharyngeal Phase:  Apparent pharyngeal pooling  Delayed swallow initiation    Patient/Family Education:Education, results and recommendations given to the Pt and nurse, who verbalized understanding    Timed Code Treatment: 0 minutes     Total Treatment Time: 24 minutes     Discharge Recommendations: Speech Therapy for Speech/Dysphagia treatment at discharge. If patient discharges prior to next session this note will serve as a discharge summary.       Speedy Velazquez, [address]  Speech Language Pathologist

## 2021-04-25 NOTE — H&P
convulsions. No incontinence. Does  have unwitnessed syncope. No angina pectoris. Does have exertional  shortness of breath. Does have poor coordination while ambulating. No  orthopnea, no paroxysmal nocturnal dyspnea. No hematemesis. No melena. No genitourinary complaint except incontinence. There is overall  failure to thrive. The patient is somewhat undernourished, BMI is only  21. No intermittent claudication. No lower extremity edema. PHYSICAL EXAMINATION:  GENERAL:  Alert, awake, oriented x1, moderately distressed 80year-old  pleasant woman, appears very cooperative and responds to all the  commands appropriately. VITAL SIGNS:  Her temperature is 97.6, blood pressure 113/76,  respirations 12, heart rate 89. O2 sat is 100% on room air. HEENT:  Oral mucosa dry. SKIN:  Warm and dry and pale. NECK:  Supple. Faint carotid bruit. No jugular venous distention. No  lymphadenopathy. No thyromegaly. LUNGS:  Vesicular breath sounds. Poor inspiration. HEART:  Regular rate and rhythm. S1, S2 without any S3 or S4 gallop. ABDOMEN:  Soft, nontender. Bowel sounds present. EXTREMITY:  Shows no cyanosis or edema. Distal pulsations are very  weak. NEUROLOGICAL:  The patient has generalized neuromuscular weakness with  poor coordination. Babinski is absent. LABORATORY DATA:  Lab evaluation shows sodium 139, potassium 5.0,  chloride 105, CO2 29, BUN 19, creatinine 0.7, anion gap is 5, GFR is  more than 60. Blood sugar is 94. Liver panel within normal limit. White blood cell count is 8.7, hemoglobin/hematocrit 10.1 and 32.9,  platelet count is 282. PT/INR is 13.1 and 1. 13. Microbiology report is  pending. Urinalysis shows no evidence of acute urinary tract infection. DIAGNOSTIC DATA:  CT scan of the head without contrast shows no evidence  of acute intracranial abnormality. CT scan of the cervical spine, no  acute osseous abnormality identified in the cervical spine.   Chest x-ray and x-ray of the pelvis and bilateral hip, there may be fracture of the  inferior pubic rami. CT of the pelvis is limited for no acute  cardiopulmonary disease. X-ray of the pelvis shows no acute osseous  abnormality. ASSESSMENT:  Syncope, hypertension, osteopenia, moderate malnutrition,  type 2 diabetes mellitus which is controlled, pure hyperlipidemia, gait  disorder, history of TIA, diabetic neuropathy, low back pain, history of  meningioma, poor memory, risk for falls. PLAN:  Get her admitted to do a carotid Doppler, transthoracic  echocardiogram as a part of syncope workup and put on telemetry. She is  a full code which is very regretful. The patient also has late onset  Alzheimer's disease and there is overall ongoing failure to thrive. Plan is get her admitted and see the orders above.         Jose Armando Aguirre MD    D: 04/24/2021 23:50:05       T: 04/25/2021 0:00:42     [id number]  Job#: [id number]     Doc#: [id number]

## 2021-04-25 NOTE — PROGRESS NOTES
Patient Active Problem List   Diagnosis    Diabetes mellitus type 2, controlled ([address]    Pure hypercholesterolemia    Gait disorder    Diabetic neuropathy ([address]    Low back pain    Meningioma ([address]    Poor memory    Risk for falls    Hypertension    Late onset Alzheimer's disease without behavioral disturbance ([address]    Failure to thrive in adult    Moderate malnutrition (HCC)    Syncope and collapse   H&P dictated

## 2021-04-25 NOTE — PROGRESS NOTES
Occupational Therapy   Occupational Therapy Initial Assessment  Date: 2021   Patient Name: Soniya Benton  MRN: [id number]     : 1931    Date of Service: 2021    Discharge Recommendations: Soniya Benton scored a 10/24 on the AM-PAC ADL Inpatient form. Current research shows that an AM-PAC score of 17 or less is typically not associated with a discharge to the patient's home setting. Based on the patient's AM-PAC score and their current ADL deficits, it is recommended that the patient have 3-5 sessions per week of Occupational Therapy at d/c to increase the patient's independence. Please see assessment section for further patient specific details. If patient discharges prior to next session this note will serve as a discharge summary. Please see below for the latest assessment towards goals. OT Equipment Recommendations  Equipment Needed: Yes(Refer to next level of care)    Assessment   Performance deficits / Impairments: Decreased functional mobility ; Decreased safe awareness;Decreased balance;Decreased coordination;Decreased ADL status; Decreased cognition;Decreased posture;Decreased endurance;Decreased strength  Assessment: Patient presents with the above deficits impacting occupational performance and functioning below baseline, skilled OT services needed to address deficits to facilitate safe return to PLOF  Treatment Diagnosis: Above deficits associated with fall  Prognosis: Fair  Decision Making: Medium Complexity  History: Parkinson's, CAD, HTN, HLD, a-fib, anemia, hypothyroidism, MI  Exam: As stated above  Assistance / Modification: RW/2 person  OT Education: OT Role;Plan of Care;Transfer Training;Energy Conservation;Equipment  Patient Education: Patient educated on OT role, plan of care, transfer training, energy conservation, equipment and patient verbalized understanding, repetition needed for carryover but patient with decreased cognition  Barriers to Learning: Cognition  REQUIRES OT FOLLOW UP: Yes  Activity Tolerance  Activity Tolerance: Patient limited by pain;Treatment limited secondary to decreased cognition  Activity Tolerance: Patient with increased R hip pain with movement. Patient not able to tolerate OOB at this time and patient with decreased cognition. Safety Devices  Safety Devices in place: Yes  Type of devices: All fall risk precautions in place;Gait belt;Bed alarm in place; Patient at risk for falls; Left in bed;Call light within reach;Nurse notified  Restraints  Initially in place: No           Patient Diagnosis(es): The primary encounter diagnosis was Fall from bed, initial encounter. A diagnosis of Elevated troponin was also pertinent to this visit. has a past medical history of Anxiety, Diabetic neuropathy ([address] Dysphagia, Gait disorder, Hyperlipidemia, Hypertension, Meningioma ([address] Poor memory, TIA (transient ischemic attack), and Type II or unspecified type diabetes mellitus without mention of complication, not stated as uncontrolled. has a past surgical history that includes Cholecystectomy; hysteroscopy; lipoma resection; and Colonoscopy. Treatment Diagnosis: Above deficits associated with fall      Restrictions  Restrictions/Precautions  Restrictions/Precautions: Fall Risk(high fall risk)  Required Braces or Orthoses?: No  Position Activity Restriction  Other position/activity restrictions: The patient is a 75-year-old elderly, blackAmerican lady with an unwitnessed syncope and a fall, was brought in forfurther evaluation. She arrived from [facility] via EMS. She doeshave a history of dementia. No obvious deformity. She is a poorhistorian and overall denied any chest pain. She was found on the floorin the extended care facility. It is unclear what happened but we weretold this morning by the F that the patient was severely demented andwas found on the floor with an unwitnessed syncope.   The patient deniedany chest pain, denied any shortness of breath. Subjective   General  Chart Reviewed: Yes  Patient assessed for rehabilitation services?: Yes  Family / Caregiver Present: No  Subjective  Subjective: Patient supine in bed upon arrival, agreeable to therapy  Patient Currently in Pain: No  Pain Assessment  Pain Assessment: 0-10  Pain Level: 0  [place]iver[place] Pain Rating: Hurts whole lot  Response to Pain Intervention: Patient Satisfied  Vital Signs  Temp: 97.2 °F (36.2 °C)  Temp Source: Axillary  Pulse: 106  Heart Rate Source: Monitor  Resp: 16  BP: 132/79  BP Location: Left upper arm  Level of Consciousness: Alert (0)  MEWS Score: 2  Patient Currently in Pain: No  Oxygen Therapy  SpO2: 96 %  Pulse Oximeter Device Mode: Intermittent  Pulse Oximeter Device Location: Finger  O2 Device: None (Room air)    Social/Functional History  Social/Functional History  Lives With: Alone(daughter comes daily)  Type of Home: House  Home Layout: One level  Home Access: Stairs to enter with rails  Entrance Stairs - Number of Steps: 5-6 MERLY  Entrance Stairs - Rails: Both  Bathroom Shower/Tub: Tub/Shower unit, Shower chair with back  Bathroom Toilet: Standard  Home Equipment: 4 wheeled walker, Cane, Lift chair  ADL Assistance: Needs assistance(daughter comes daily to assist- states she typicaly takes baths)  Homemaking Responsibilities: No(daughter performs)  Ambulation Assistance: Needs assistance(states she uses 4WW walker to ambulate but \"waits until daughter is there\")  Active : No  Additional Comments: currently at [facility] for therpy. Recent admission with d/c 4/16/21. Multiple recent falls, unable to recall how many. States uses w/c currently with 2-person assist with staff from [facility].      Objective   Vision: Within Functional Limits  Hearing: Within functional limits    Orientation  Overall Orientation Status: Impaired  Orientation Level: Oriented to situation;Oriented to person;Oriented to time;Disoriented to place     Balance  Sitting for functional transfers  [address]: No visual deficits  Visual History: No significant visual history  Patient Visual Report: No visual complaint reported. Cognition  Overall Cognitive Status: Exceptions  Arousal/Alertness: Delayed responses to stimuli  Following Commands: Inconsistently follows commands; Follows one step commands with increased time; Follows one step commands with repetition  Attention Span: Attends with cues to redirect; Difficulty attending to directions; Difficulty dividing attention  Memory: Decreased recall of biographical Information;Decreased short term memory;Decreased long term memory  Safety Judgement: Decreased awareness of need for assistance;Decreased awareness of need for safety  Problem Solving: Decreased awareness of errors  Insights: Decreased awareness of deficits  Initiation: Requires cues for all  Sequencing: Requires cues for all  Cognition Comment: Patient easily distracted and minimal verbal cueing needed for redirection.   Patient with minimal verbal and physical cueing needed for sequencing and initiation of functional activties  Perception  Overall Perceptual Status: Impaired  Unilateral Attention: Cues to maintain midline in sitting  Initiation: Cues to initiate tasks  Motor Planning: Hand over hand to sequence tasks(Patient with intermittent needed for [place] positioning of BUE for functional activties)  Perseveration: Perseverates during conversation(Minimal verbal cueing needed to redirect patient for functional activities)     Sensation  Overall Sensation Status: WFL      LUE AROM (degrees)  LUE AROM : WFL  Left Hand AROM (degrees)  Left Hand AROM: WFL  RUE AROM (degrees)  RUE AROM : WFL  Right Hand AROM (degrees)  Right Hand AROM: WFL      Plan   Plan  Times per week: 3-5x/wk  Times per day: Daily  Current Treatment Recommendations: Strengthening, Balance Training, Safety Education & Training, Patient/Caregiver Education & Training, Self-Care / ADL, Functional Mobility Training, Endurance Training, Pain Management    AM-PAC Score   AM-PAC Inpatient Daily Activity Raw Score: 10 (04/25/21 0914)  AM-PAC Inpatient ADL T-Scale Score : 27.31 (04/25/21 0914)  ADL Inpatient CMS 0-100% Score: 74.7 (04/25/21 0914)  ADL Inpatient CMS G-Code Modifier : CL (04/25/21 0914)    Goals  Short term goals  Time Frame for Short term goals: STG=Discharge  Short term goal 1: Patient will complete bed mobility with CGA  Short term goal 2: Patient will complete functional transfers with Macy  Short term goal 3: Patient will complete LB dressing with Macy  Short term goal 4: Patient will complete toileting tasks with Mcay  Short term goal 5: Patient will complete bathing tasks with Macy  Long term goals  Time Frame for Long term goals : LTG=STG       Therapy Time   Individual Concurrent Group Co-treatment   Time In       0801   Time Out       0833   Minutes       32        Timed Code Treatment Minutes:  17 minutes   Total Treatment Minutes:  32 minutes       LULU Marx/JORDI [id number]

## 2021-04-25 NOTE — PROGRESS NOTES
Physical Therapy    Facility/Department: [address] NURSING  Initial Assessment    NAME: Monisha Abreu  : 1931  MRN: [id number]    Date of Service: 2021    Discharge Recommendations:  Monisha Abreu scored a 8/24 on the AM-PAC short mobility form. Current research shows that an AM-PAC score of 17 or less is typically not associated with a discharge to the patient's home setting. Based on the patient's AM-PAC score and their current functional mobility deficits, it is recommended that the patient have 3-5 sessions per week of Physical Therapy at d/c to increase the patient's independence. Please see assessment section for further patient specific details. If patient discharges prior to next session this note will serve as a discharge summary. Please see below for the latest assessment towards goals. 3-5 sessions per week   PT Equipment Recommendations  Equipment Needed: No    Assessment   Body structures, Functions, Activity limitations: Decreased functional mobility ; Decreased strength;Decreased endurance;Decreased safe awareness;Decreased balance  Assessment: Patient not at baseline function and would benefit from skilled PT to address above deficits and facilitate return to baseline function. Treatment Diagnosis: decreased functional mobility, impiared gait, decreased balance  Prognosis: Fair  Decision Making: Medium Complexity  Clinical Presentation: evolving  PT Education: PT Role;Plan of Care;Goals  Patient Education: d/c recommendations - verbalized understanding  Barriers to Learning: cognitive  REQUIRES PT FOLLOW UP: Yes  Activity Tolerance  Activity Tolerance: Patient limited by cognitive status; Patient Tolerated treatment well       Patient Diagnosis(es): The primary encounter diagnosis was Fall from bed, initial encounter. A diagnosis of Elevated troponin was also pertinent to this visit.      has a past medical history of Anxiety, Diabetic neuropathy ([facility] Dysphagia, Gait disorder, Hyperlipidemia, Hypertension, Meningi[address] ([facility] [address] [facility] Poor memory, TIA (transient ischemic attack), and Type II or unspecified type diabetes mellitus without mention of complication, not stated as uncontrolled. has a past surgical history that includes Cholecystectomy; hysteroscopy; lipoma resection; and Colonoscopy. Restrictions  Restrictions/Precautions  Restrictions/Precautions: Fall Risk(high fall risk)  Required Braces or Orthoses?: No  Position Activity Restriction  Other position/activity restrictions: The patient is a 80-year-old elderly, blackAmerican lady with an unwitnessed syncope and a fall, was brought in forfurther evaluation. She arrived from [facility] via EMS. She doeshave a history of dementia. No obvious deformity. She is a poorhistorian and overall denied any chest pain. She was found on the floorin the extended care facility. It is unclear what happened but we weretold this morning by the F that the patient was severely demented andwas found on the floor with an unwitnessed syncope. The patient deniedany chest pain, denied any shortness of breath. Vision/Hearing  Vision: Within Functional Limits  Hearing: Within functional limits     Subjective  General  Chart Reviewed: Yes  Patient assessed for rehabilitation services?: Yes  Family / Caregiver Present: No  Diagnosis: syncope and collapse  Follows Commands: Impaired  Other (Comment): increased cues, delayed responses  General Comment  Comments: supine in bed upon arrival with alarm on. Subjective  Subjective: Denied pain at rest. Reported R hip pain with all mobility. Nursing alerted. Agreeable to therapy.   Pain Screening  Patient Currently in Pain: No  Vital Signs  Patient Currently in Pain: Denies       Orientation  Orientation  Overall Orientation Status: Impaired  Orientation Level: Oriented to time;Oriented to situation;Oriented to person;Disoriented to place(thought she was at [facility])  Social/Functional History  Social/Functional History  Lives With: Alone(daughter comes daily)  Type of Home: House  Home Layout: One level  Home Access: Stairs to enter with rails  Entrance Stairs - Number of Steps: 5-6 MERLY  Entrance Stairs - Rails: Both  Bathroom Shower/Tub: Tub/Shower unit, Shower chair with back  Bathroom Toilet: Standard  Home Equipment: 4 wheeled walker, Cane, Lift chair  ADL Assistance: Needs assistance(daughter comes daily to assist- states she typicaly takes baths)  Homemaking Responsibilities: No(daughter performs)  Ambulation Assistance: Needs assistance(states she uses 4WW walker to ambulate but \"waits until daughter is there\")  Active : No  Additional Comments: currently at [facility] for therpy. Recent admission with d/c 4/16/21. Multiple recent falls, unable to recall how many. States uses w/c currently with 2-person assist with staff from [facility]. Objective          AROM RLE (degrees)  RLE AROM: WFL  AROM LLE (degrees)  LLE AROM : WFL  Strength Other  Other: unable to formally assess due to decreased command following     Sensation  Overall Sensation Status: WFL  Bed mobility  Sit to Supine: Moderate assistance  Transfers  Sit to Stand: 2 Person Assistance(max A of 2)  Stand to sit: 2 Person Assistance(max A of 2)  Comment: Attempted standing to walker initially, patient with trunk extension and BLE sliding anteriorly with COG. Returned to seated for safety. Attempted 2nd stand with max A of 2 with knees blocked and PT in front of patient. Patient with contiued trunk extension despite max verbal and tactile cues. Returned to seated. Unable to achieve full upright posture despite 2 attempts.         Balance  Sitting - Static: Fair(min A progressing to SBA (tendency for R lateral lean))  Sitting - Dynamic: Poor  Standing - Static: Poor        Plan   Plan  Times per week: 3-5  Times per day: Daily  Current Treatment Recommendations: Strengthening, Balance Training, Functional Mobility Training, Transfer Training, Gait Training, Safety Education & Training, Home Exercise Program  Safety Devices  Type of devices: All fall risk precautions in place, Bed alarm in place, Call light within reach, Left in bed, Nurse notified, Patient at risk for falls  Restraints  Initially in place: No      AM-PAC Score  AM-PAC Inpatient Mobility Raw Score : 8 (04/25/21 0906)  AM-PAC Inpatient T-Scale Score : 28.52 (04/25/21 0906)  Mobility Inpatient CMS 0-100% Score: 86.62 (04/25/21 0906)  Mobility Inpatient CMS G-Code Modifier : CM (04/25/21 3888)          Goals  Short term goals  Time Frame for Short term goals: To be met prior to discharge  Short term goal 1: Bed mobility with min A  Short term goal 2: Sit to/from stand with max A  Short term goal 3: Bed to/from chair with max A  Patient Goals   Patient goals : unable to state       Therapy Time   Individual Concurrent Group Co-treatment   Time In 0801         Time Out 0833         Minutes 32         Timed Code Treatment Minutes: 0 Minutes     Units billed shared with OT due to observation status.      Christina Nelson, PT     Thanks, Christina Nelson, PT, DPT [id number]

## 2021-04-25 NOTE — PROGRESS NOTES
Evening assessment complete. VSS. Pt brief changed. Incontinent of urine. Pt repositioned for comfort. Call light in reach. Meds administered crushed in applesauce. Foam dressing on R hip wound and sacrum.

## 2021-04-26 LAB
GLUCOSE BLD-MCNC: 121 MG/DL (ref 70–99)
GLUCOSE BLD-MCNC: 79 MG/DL (ref 70–99)
GLUCOSE BLD-MCNC: 83 MG/DL (ref 70–99)
LV EF: 48 %
LVEF MODALITY: NORMAL
PERFORMED ON: ABNORMAL
PERFORMED ON: NORMAL
PERFORMED ON: NORMAL

## 2021-04-26 PROCEDURE — 97530 THERAPEUTIC ACTIVITIES: CPT

## 2021-04-26 PROCEDURE — 92526 ORAL FUNCTION THERAPY: CPT

## 2021-04-26 PROCEDURE — 6360000002 HC RX W HCPCS: Performed by: INTERNAL MEDICINE

## 2021-04-26 PROCEDURE — G0378 HOSPITAL OBSERVATION PER HR: HCPCS

## 2021-04-26 PROCEDURE — 93880 EXTRACRANIAL BILAT STUDY: CPT

## 2021-04-26 PROCEDURE — 93306 TTE W/DOPPLER COMPLETE: CPT

## 2021-04-26 PROCEDURE — 96372 THER/PROPH/DIAG INJ SC/IM: CPT

## 2021-04-26 PROCEDURE — 6370000000 HC RX 637 (ALT 250 FOR IP): Performed by: INTERNAL MEDICINE

## 2021-04-26 RX ORDER — POTASSIUM CHLORIDE 20 MEQ/1
40 TABLET, EXTENDED RELEASE ORAL PRN
Status: DISCONTINUED | OUTPATIENT
Start: 2021-04-26 | End: 2021-04-27 | Stop reason: HOSPADM

## 2021-04-26 RX ORDER — 0.9 % SODIUM CHLORIDE 0.9 %
500 INTRAVENOUS SOLUTION INTRAVENOUS PRN
Status: DISCONTINUED | OUTPATIENT
Start: 2021-04-26 | End: 2021-04-27 | Stop reason: HOSPADM

## 2021-04-26 RX ORDER — POTASSIUM CHLORIDE 7.45 MG/ML
10 INJECTION INTRAVENOUS PRN
Status: DISCONTINUED | OUTPATIENT
Start: 2021-04-26 | End: 2021-04-27 | Stop reason: HOSPADM

## 2021-04-26 RX ORDER — HYDRALAZINE HYDROCHLORIDE 20 MG/ML
10 INJECTION INTRAMUSCULAR; INTRAVENOUS EVERY 6 HOURS PRN
Status: DISCONTINUED | OUTPATIENT
Start: 2021-04-26 | End: 2021-04-27 | Stop reason: HOSPADM

## 2021-04-26 RX ADMIN — DONEPEZIL HYDROCHLORIDE 10 MG: 5 TABLET, FILM COATED ORAL at 20:11

## 2021-04-26 RX ADMIN — ENOXAPARIN SODIUM 30 MG: 30 INJECTION SUBCUTANEOUS at 09:46

## 2021-04-26 RX ADMIN — METFORMIN HYDROCHLORIDE 500 MG: 500 TABLET ORAL at 09:46

## 2021-04-26 RX ADMIN — METFORMIN HYDROCHLORIDE 500 MG: 500 TABLET ORAL at 16:47

## 2021-04-26 RX ADMIN — TRIAMCINOLONE ACETONIDE: 1 CREAM TOPICAL at 20:11

## 2021-04-26 RX ADMIN — GABAPENTIN 100 MG: 100 CAPSULE ORAL at 20:11

## 2021-04-26 RX ADMIN — CLOPIDOGREL BISULFATE 75 MG: 75 TABLET ORAL at 09:46

## 2021-04-26 RX ADMIN — METOPROLOL SUCCINATE 25 MG: 25 TABLET, EXTENDED RELEASE ORAL at 09:46

## 2021-04-26 RX ADMIN — PRAVASTATIN SODIUM 40 MG: 40 TABLET ORAL at 20:11

## 2021-04-26 RX ADMIN — ASPIRIN 81 MG: 81 TABLET, CHEWABLE ORAL at 09:46

## 2021-04-26 RX ADMIN — LISINOPRIL 10 MG: 10 TABLET ORAL at 09:46

## 2021-04-26 RX ADMIN — TRIAMCINOLONE ACETONIDE: 1 CREAM TOPICAL at 09:45

## 2021-04-26 RX ADMIN — GABAPENTIN 100 MG: 100 CAPSULE ORAL at 09:46

## 2021-04-26 RX ADMIN — HYDROCHLOROTHIAZIDE 12.5 MG: 25 TABLET ORAL at 09:46

## 2021-04-26 ASSESSMENT — PAIN SCALES - GENERAL
PAINLEVEL_OUTOF10: 0

## 2021-04-26 NOTE — PROGRESS NOTES
Occupational Therapy  Facility/Department: [address] NURSING  Daily Treatment Note  NAME: Jana Lucero  : 1931  MRN: [id number]    Date of Service: 2021    Discharge Recommendations:    Jana Lucero scored a 10/24 on the AM-PAC ADL Inpatient form. Current research shows that an AM-PAC score of 17 or less is typically not associated with a discharge to the patient's home setting. Based on the patient's AM-PAC score and their current ADL deficits, it is recommended that the patient have 3-5 sessions per week of Occupational Therapy at d/c to increase the patient's independence. Please see assessment section for further patient specific details. If patient discharges prior to next session this note will serve as a discharge summary. Please see below for the latest assessment towards goals. OT Equipment Recommendations  Equipment Needed: No  Other: refer to next level of care    Assessment   Performance deficits / Impairments: Decreased functional mobility ; Decreased safe awareness;Decreased balance;Decreased coordination;Decreased ADL status; Decreased cognition;Decreased posture;Decreased endurance;Decreased strength  Assessment: Pt was dependent (A of 2) for bed mobility and transfers. Pt presented with posterior lean and required verbal and tactile cueing to correct posture in stance. Pt continues to present with the above deficits and is functioning below baseline. pt would benefit from continued skilled OT services to promote functional independence in order to return to OF.   Treatment Diagnosis: Above deficits associated with fall  Prognosis: Fair  Decision Making: Medium Complexity  History: Parkinson's, CAD, HTN, HLD, a-fib, anemia, hypothyroidism, MI  OT Education: OT Role;Plan of Care;Transfer Training;Energy Conservation;Equipment  Patient Education: pt would benefit from repetition for carryover  Barriers to Learning: Cognition  REQUIRES OT FOLLOW UP: Yes  Activity Tolerance  Activity Tolerance: Treatment limited secondary to decreased cognition;Patient limited by fatigue  Activity Tolerance: BP seated /77, post transfer was 123/71. Pt fatigued easily after activity, tolerated ~15 min sitting EOB  Safety Devices  Safety Devices in place: Yes  Type of devices: All fall risk precautions in place;Gait belt;Bed alarm in place; Patient at risk for falls; Left in bed;Call light within reach;Nurse notified--left in bed as pt scheduled for upcoming testing per RN       Patient Diagnosis(es): The primary encounter diagnosis was Fall from bed, initial encounter. A diagnosis of Elevated troponin was also pertinent to this visit. has a past medical history of Anxiety, Diabetic neuropathy ([address] Dysphagia, Gait disorder, Hyperlipidemia, Hypertension, Meningioma ([address] Poor memory, TIA (transient ischemic attack), and Type II or unspecified type diabetes mellitus without mention of complication, not stated as uncontrolled. has a past surgical history that includes Cholecystectomy; hysteroscopy; lipoma resection; and Colonoscopy. Restrictions  Restrictions/Precautions  Restrictions/Precautions: Fall Risk(High Fall Risk)  Required Braces or Orthoses?: No  Position Activity Restriction  Other position/activity restrictions: The patient is a 15-year-old elderly, blackAmerican lady with an unwitnessed syncope and a fall, was brought in forfurther evaluation. She arrived from [facility] via EMS. She doeshave a history of dementia. No obvious deformity. She is a poorhistorian and overall denied any chest pain. She was found on the floorin the extended care facility. It is unclear what happened but we weretold this morning by the F that the patient was severely demented andwas found on the floor with an unwitnessed syncope. The patient deniedany chest pain, denied any shortness of breath.   Subjective   General  Chart Reviewed: Yes  Patient assessed for rehabilitation services?: Yes  Family / Caregiver Present: Yes(daughter)  Subjective  Subjective: Patient in semi fowlers position in bed finishing lunch upon arrival, agreeable to OT/PT cotx   Patient Currently in Pain: Denies  Orientation  Orientation  Overall Orientation Status: Impaired  Objective    ADL  Additional Comments: pt refused ADL's despite offers         Balance  Sitting Balance: Stand by assistance  Standing Balance: Dependent/Total(MaxA of 2)  Standing Balance  Time: ~5-8 seconds  Activity: sit <> stand attempts from EOB  Comment: blocking knees, verbal and tactile cueing to correct posterior lean  Bed mobility  Supine to Sit: Maximum assistance;2 Person assistance(MaxA of 2)  Sit to Supine: Maximum assistance;2 Person assistance(MaxA of 2)  Scooting: Dependent/Total(MaxA of 2)  Transfers  Sit to stand: 2 Person assistance;Dependent/Total(MaxA of 2)  Stand to sit: Dependent/Total;2 Person assistance(MaxA of 2)  Transfer Comments: attempted x3 sit>stands from EOB, severe posterior lean and feet sliding out d/t severe B LE weakness/decreased glute activation. x1 successful partial stand on 3rd attempt, blocking knees and MaxA of 2        Cognition  Overall Cognitive Status: Exceptions  Arousal/Alertness: Delayed responses to stimuli  Following Commands: Inconsistently follows commands; Follows one step commands with increased time; Follows one step commands with repetition  Attention Span: Attends with cues to redirect; Difficulty attending to directions; Difficulty dividing attention  Memory: Decreased recall of biographical Information;Decreased short term memory;Decreased long term memory  Safety Judgement: Decreased awareness of need for assistance;Decreased awareness of need for safety  Problem Solving: Decreased awareness of errors  Insights: Decreased awareness of deficits  Initiation: Requires cues for all  Sequencing: Requires cues for all     Perception  Overall Perceptual Status: Impaired  Unilateral Attention: Cues to maintain midline in sitting  Initiation: Cues to initiate tasks  Motor Planning: Hand over hand to sequence tasks     Type of ROM/Therapeutic Exercise  Type of ROM/Therapeutic Exercise: PROM  Comment: pt was unable to sequence repetitions w/ AROM therefore PROM was required  Exercises  Elbow Flexion: 10xreps B UE  Elbow Extension: 10x reps B UE  Wrist Flexion: 10xreps B UE  Wrist Extension: 10x reps B UE  Grasp/Release: 10x reps B UE    Plan   Plan  Times per week: 3-5x/wk  Times per day: Daily  Current Treatment Recommendations: Strengthening, Balance Training, Safety Education & Training, Patient/Caregiver Education & Training, Self-Care / ADL, Functional Mobility Training, Endurance Training, Pain Management    AM-PAC Score        AM-PAC Inpatient Daily Activity Raw Score: 10 (04/26/21 1453)  AM-PAC Inpatient ADL T-Scale Score : 27.31 (04/26/21 1453)  ADL Inpatient CMS 0-100% Score: 74.7 (04/26/21 1453)  ADL Inpatient CMS G-Code Modifier : CL (04/26/21 1453)    Goals  Short term goals  Time Frame for Short term goals: STG=Discharge  Short term goal 1: Patient will complete bed mobility with CGA--MaxA of 2 \"ongoing\" 4/26  Short term goal 2: Patient will complete functional transfers with Macy--MaxA of 2 \"ongoing\" 4/26  Short term goal 3: Patient will complete LB dressing with Macy--not addressed \"ongoing\" 4/26  Short term goal 4: Patient will complete toileting tasks with Macy--not addressed \"ongoing\" 4/26  Short term goal 5: Patient will complete bathing tasks with Macy---not addressed \"ongoing\" 4/26  Long term goals  Time Frame for Long term goals : LTG=STG       Therapy Time   Individual Concurrent Group Co-treatment   Time In       9384   Time Out       1428   Minutes       25   Timed Code Treatment Minutes: 25 Minutes   Total Treatment Minutes: Ebony ELIZONDO      [place], [address] [place] [address] provided direct supervision to student, facilitated in making skilled judgements throughout duration of session. I have reviewed and am in agreement with this treatment session.     ARGELIA Hubbard OTR/L [id number]

## 2021-04-26 NOTE — CONSULTS
Extensive disease; Total care; intake reduced; LOC full/confusion  20%   [] Bed Bound; Extensive disease; Total care; intake minimal; Drowsy/coma  10%   [] Bed Bound; Extensive disease; Total care; Mouth care only; Drowsy/coma    PPS 40%  Symptom Assessment: Appetite/Nausea/Bowels/Fatigue:    lbs. Albumin 3.0    Social History:   reports that she has never smoked. She has never used smokeless tobacco. She reports that she does not drink alcohol or use drugs. Family History:  family history includes Cancer in her father and mother; Diabetes in her maternal aunt and another family member. Psychological/Spiritual:      . Three children. Non-Tenriism.      Family Discussion:     Patient alert and orientated to name and place. Smiles frequently and answers yes/no to questions appropriately. Does not hold conversations. Supportive daughter Tenisha Pringle at bedside. Patient denies pain. Appetite fair this AM.     Discussed ACP/Code status with daughter in presence of patient. ACP has been completed. Daughter Tenisha Pringle is Mireya Ronquillo. She is in agreement to bring ACP in to be scanned into EMR. Codes status variables discussed. Patient remains as full code status. Discussed Catholic. Non-Tenriism. Patient in agreement for Chaplains support. Will notify. DC POC is to return to [facility] [address]. Note patient and daughter state she came to hospital with upper dentures and can not locate them. I gave this information to Nurse Fozia Arriaza to investigate further. Will continue to follow as needed.

## 2021-04-26 NOTE — PROGRESS NOTES
Physical Therapy  Facility/Department: [address] NURSING  Daily Treatment Note  NAME: Zoya Willams  : 1931  MRN: [id number]    Date of Service: 2021    Discharge Recommendations: Zoya Willams scored a 9/24 on the AM-PAC short mobility form. Current research shows that an AM-PAC score of 17 or less is typically not associated with a discharge to the patient's home setting. Based on the patient's AM-PAC score and their current functional mobility deficits, it is recommended that the patient have 3-5 sessions per week of Physical Therapy at d/c to increase the patient's independence. Please see assessment section for further patient specific details. If patient discharges prior to next session this note will serve as a discharge summary. Please see below for the latest assessment towards goals. 3-5 sessions per week   PT Equipment Recommendations  Equipment Needed: No    Assessment   Body structures, Functions, Activity limitations: Decreased functional mobility ; Decreased strength;Decreased endurance;Decreased safe awareness;Decreased balance  Assessment: Pt continues to present below baseline funtioning. Requires Max A x2 for bed mobility and sit<>stand transfers. Pt would continue to benefit from skilled therapy in order to improve functional mobility and safely return to PLOF  Treatment Diagnosis: decreased functional mobility, impiared gait, decreased balance  Prognosis: Fair  Decision Making: Medium Complexity  Clinical Presentation: evolving  PT Education: PT Role;Plan of Care;Goals; General Safety; Functional Mobility Training  Patient Education: Pt verbalized understanding  Barriers to Learning: cognitive  REQUIRES PT FOLLOW UP: Yes  Activity Tolerance  Activity Tolerance: Patient limited by cognitive status; Patient Tolerated treatment well;Patient limited by fatigue;Patient limited by endurance  Activity Tolerance: Due to pt going down for echo, pt treatment ended supine in bed Patient Diagnosis(es): The primary encounter diagnosis was Fall from bed, initial encounter. A diagnosis of Elevated troponin was also pertinent to this visit. has a past medical history of Anxiety, Diabetic neuropathy ([address] Dysphagia, Gait disorder, Hyperlipidemia, Hypertension, Meningioma ([address] Poor memory, TIA (transient ischemic attack), and Type II or unspecified type diabetes mellitus without mention of complication, not stated as uncontrolled. has a past surgical history that includes Cholecystectomy; hysteroscopy; lipoma resection; and Colonoscopy. Restrictions  Restrictions/Precautions  Restrictions/Precautions: Fall Risk(High Fall Risk Simultaneous filing. User may not have seen previous data.)  Required Braces or Orthoses?: No(Simultaneous filing. User may not have seen previous data.)  Position Activity Restriction  Other position/activity restrictions: The patient is a 61-year-old elderly, blackAmerican lady with an unwitnessed syncope and a fall, was brought in forfurther evaluation. She arrived from [facility] via EMS. She doeshave a history of dementia. No obvious deformity. She is a poorhistorian and overall denied any chest pain. She was found on the floorin the extended care facility. It is unclear what happened but we weretold this morning by the F that the patient was severely demented andwas found on the floor with an unwitnessed syncope. The patient deniedany chest pain, denied any shortness of breath. (Simultaneous filing. User may not have seen previous data.)  Subjective   General  Chart Reviewed: Yes  Response To Previous Treatment: Patient with no complaints from previous session. Family / Caregiver Present: Yes(daughter)  Subjective  Subjective: Pt reports no pain at this time.   General Comment  Comments: supine in bed upon arrival. pt agreeble to therapy          Orientation  Orientation  Overall Orientation Status: (did not formally assess this visit)  Cognition   Cognition  Overall Cognitive Status: Exceptions  Arousal/Alertness: Delayed responses to stimuli  Following Commands: Inconsistently follows commands; Follows one step commands with increased time; Follows one step commands with repetition  Attention Span: Attends with cues to redirect; Difficulty attending to directions; Difficulty dividing attention  Memory: Decreased recall of biographical Information;Decreased short term memory;Decreased long term memory  Safety Judgement: Decreased awareness of need for assistance;Decreased awareness of need for safety  Problem Solving: Decreased awareness of errors  Insights: Decreased awareness of deficits  Initiation: Requires cues for all  Sequencing: Requires cues for all  Objective   Bed mobility  Supine to Sit: Maximum assistance;2 Person assistance(Max A x2)  Sit to Supine: Maximum assistance;2 Person assistance(Max A x2)  Scooting: Maximal assistance(Max A x2)  Comment: HOB elevated, grab bars, increased time to complete  Transfers  Sit to Stand: 2 Person Assistance;Maximum Assistance(max A x2)  Stand to sit: 2 Person Assistance;Maximum Assistance(Max A x2)  Comment: Initial attempt at standing max A x2 the pts feet slipped out in front of her due to poor LE extensor activiation.  Following 2 attempts PT blocked at pt proximal tib/fib and talocrural jt with pt being able to maintain standing for ~8 secs with RW and Max Ax2  Ambulation  Ambulation?: No  Stairs/Curb  Stairs?: No     Balance  Posture: Fair  Sitting - Static: -(SBA)  Sitting - Dynamic: Fair;+(SBA)  Standing - Static: Poor(Pt able to maintain standing ~8 sec with RW and max A x2)  Exercises  Hip Flexion: x20 B seated marches  Knee Long Arc Quad: x20 B seated LAQs  Ankle Pumps: x20 seated ankle pumps              AM-PAC Score  AM-PAC Inpatient Mobility Raw Score : 9 (04/26/21 1450)  AM-PAC Inpatient T-Scale Score : 30.55 (04/26/21 1450)  Mobility Inpatient CMS 0-100% Score: 81.38 (04/26/21 1450)  Mobility Inpatient CMS G-Code Modifier : CM (04/26/21 1450)          Goals  Short term goals  Time Frame for Short term goals: To be met prior to discharge  Short term goal 1: Bed mobility with min A  Short term goal 2: Sit to/from stand with max A  Short term goal 3: Bed to/from chair with max A  Patient Goals   Patient goals : unable to state  NO GOALS MET THIS DATE   Plan    Plan  Times per week: 3-5  Times per day: Daily  Current Treatment Recommendations: Strengthening, Balance Training, Functional Mobility Training, Transfer Training, Gait Training, Safety Education & Training, Home Exercise Program  Safety Devices  Type of devices: All fall risk precautions in place, Bed alarm in place, Call light within reach, Left in bed, Nurse notified, Patient at risk for falls, Gait belt  Restraints  Initially in place: No     Therapy Time   Individual Concurrent Group Co-treatment   Time In       1403   Time Out       1428   Minutes       25        Timed Code Treatment Minutes:  10 (observation, mins split with OT)   Total Treatment Minutes:  25  Brooke Branham, SPT   PT observed and directed the above evaluation/treatment.   [address] [place], DPT [id number]

## 2021-04-26 NOTE — PROGRESS NOTES
Department of Internal Medicine  General Internal Medicine   Progress Note      SUBJECTIVE: denies dizziness , appears pleasantly confused mostly non verbal but not aphasic does respond to simple questions     History obtained from chart review and the patient  General ROS: positive for  - fatigue, malaise and weight loss  negative for - chills, fever or night sweats  Psychological ROS: positive for - disorientation and memory difficulties  negative for - anxiety, behavioral disorder, hallucinations, hostility or irritability  Ophthalmic ROS: negative  Respiratory ROS: no cough, shortness of breath, or wheezing  Cardiovascular ROS: no chest pain or dyspnea on exertion  Gastrointestinal ROS: no abdominal pain, change in bowel habits, or black or bloody stools  Genito-Urinary ROS: incontinence   Musculoskeletal ROS: chronic pain   Neurological ROS: questionable TIA   Dermatological ROS: negative    OBJECTIVE      Medications      Current Facility-Administered Medications: aspirin chewable tablet 81 mg, 81 mg, Oral, Daily  clopidogrel (PLAVIX) tablet 75 mg, 75 mg, Oral, Daily  donepezil (ARICEPT) tablet 10 mg, 10 mg, Oral, Nightly  gabapentin (NEURONTIN) capsule 100 mg, 100 mg, Oral, BID  hydroCHLOROthiazide (HYDRODIURIL) tablet 12.5 mg, 12.5 mg, Oral, Daily  lisinopril (PRINIVIL;ZESTRIL) tablet 10 mg, 10 mg, Oral, BID  metFORMIN (GLUCOPHAGE) tablet 500 mg, 500 mg, Oral, BID WC  metoprolol succinate (TOPROL XL) extended release tablet 25 mg, 25 mg, Oral, Daily  pravastatin (PRAVACHOL) tablet 40 mg, 40 mg, Oral, Nightly  triamcinolone (KENALOG) 0.1 % cream, , Topical, BID  enoxaparin (LOVENOX) injection 30 mg, 30 mg, Subcutaneous, Daily  perflutren lipid microspheres (DEFINITY) injection 1.65 mg, 1.5 mL, Intravenous, ONCE PRN    Physical      Vitals: /70   Pulse 110   Temp 97.5 °F (36.4 °C) (Oral)   Resp 16   Ht 5' 4\" (1.626 m)   Wt 120 lb 5.9 oz (54.6 kg)   SpO2 97%   BMI 20.66 kg/m²   Temp: Temp: 97.5 °F (36.4 °C)  Max: Temp  Av.8 °F (36.6 °C)  Min: 97 °F (36.1 °C)  Max: 98.7 °F (37.1 °C)  Respiration range:  Resp  Av.2  Min: 16  Max: 17  Pulse Range:  Pulse  Av.5  Min: 93  Max: 110  Blood pressure range:  Systolic (36KMS), UIP:726 , Min:105 , ANM:882   , Diastolic (52OME), RBC:82, Min:64, Max:79    SpO2  Av %  Min: 96 %  Max: 98 %  No intake or output data in the 24 hours ending 21 0729    Vent settings:  Pulse  Av.8  Min: 65  Max: 130  Resp  Avg: 15.8  Min: 12  Max: 21  SpO2  Av.8 %  Min: 92 %  Max: 100 %    CONSTITUTIONAL:  fatigued, alert, uncooperative, distracted, mild distress, appears stated age and thin  EYES:  Unremarkable   NECK:  Faint bruit , no JVD   BACK:  Kyphotic  and symmetric  LUNGS:  No increased work of breathing, good air exchange, clear to auscultation bilaterally, no crackles or wheezing  CARDIOVASCULAR:  normal apical pulses, regular rate and rhythm and normal S1 and S2  ABDOMEN:  Soft scaphoid BS +   MUSCULOSKELETAL:  No distal edema   NEUROLOGIC:  Grossly intact  No focal motor findings but has poor co-ordination   SKIN:  Warm dry with some pallor     Data      Recent Results (from the past 96 hour(s))   CBC Auto Differential    Collection Time: 21 10:22 AM   Result Value Ref Range    WBC 8.7 4.0 - 11.0 K/uL    RBC 3.41 (L) 4.00 - 5.20 M/uL    Hemoglobin 10.1 (L) 12.0 - 16.0 g/dL    Hematocrit 32.9 (L) 36.0 - 48.0 %    MCV 96.5 80.0 - 100.0 fL    MCH 29.6 26.0 - 34.0 pg    MCHC 30.6 (L) 31.0 - 36.0 g/dL    RDW 17.0 (H) 12.4 - 15.4 %    Platelets 149 852 - 322 K/uL    MPV 8.0 5.0 - 10.5 fL    Neutrophils % 69.5 %    Lymphocytes % 21.4 %    Monocytes % 6.7 %    Eosinophils % 1.7 %    Basophils % 0.7 %    Neutrophils Absolute 6.0 1.7 - 7.7 K/uL    Lymphocytes Absolute 1.9 1.0 - 5.1 K/uL    Monocytes Absolute 0.6 0.0 - 1.3 K/uL    Eosinophils Absolute 0.1 0.0 - 0.6 K/uL    Basophils Absolute 0.1 0.0 - 0.2 K/uL   Comprehensive Metabolic Panel w/ Reflex to [facility]    Collection Time: 04/24/21 10:22 AM   Result Value Ref Range    Sodium 139 136 - 145 mmol/L    Potassium reflex Magnesium 5.0 3.5 - 5.1 mmol/L    Chloride 105 99 - 110 mmol/L    CO2 29 21 - 32 mmol/L    Anion Gap 5 3 - 16    Glucose 94 70 - 99 mg/dL    BUN 19 7 - 20 mg/dL    CREATININE 0.7 0.6 - 1.2 mg/dL    GFR Non-African American >60 >60    GFR African American >60 >60    Calcium 9.9 8.3 - 10.6 mg/dL    Total Protein 7.0 6.4 - 8.2 g/dL    Albumin 3.0 (L) 3.4 - 5.0 g/dL    Albumin/Globulin Ratio 0.8 (L) 1.1 - 2.2    Total Bilirubin 0.3 0.0 - 1.0 mg/dL    Alkaline Phosphatase 73 40 - 129 U/L    ALT 20 10 - 40 U/L    AST 27 15 - 37 U/L    Globulin 4.0 g/dL   Troponin    Collection Time: 04/24/21 10:22 AM   Result Value Ref Range    Troponin 0.05 (H) <0.01 ng/mL   EKG 12 Lead    Collection Time: 04/24/21 10:32 AM   Result Value Ref Range    Ventricular Rate 113 BPM    Atrial Rate 131 BPM    P-R Interval 112 ms    QRS Duration 70 ms    Q-T Interval 334 ms    QTc Calculation (Bazett) 458 ms    P Axis 34 degrees    R Axis 34 degrees    T Axis 13 degrees    Diagnosis       Multifocal atrial tachycardia with occasional Premature ventricular complexesConfirmed by Tari Chavez MD, Kaur Pantoja ([id number]) on 4/25/2021 8:32:51 AM   Urinalysis Reflex to Culture    Collection Time: 04/24/21 10:50 AM    Specimen: Urine, clean catch   Result Value Ref Range    Color, UA YELLOW Straw/Yellow    Clarity, UA Clear Clear    Glucose, Ur Negative Negative mg/dL    Bilirubin Urine Negative Negative    Ketones, Urine Negative Negative mg/dL    Specific Gravity, UA 1.009 1.005 - 1.030    Blood, Urine Negative Negative    pH, UA 7.0 5.0 - 8.0    Protein, UA Negative Negative mg/dL    Urobilinogen, Urine 0.2 <2.0 E.U./dL    Nitrite, Urine Negative Negative    Leukocyte Esterase, Urine Negative Negative    Microscopic Examination Not Indicated     Urine Type Voided     Urine Reflex to Culture Not Indicated    Troponin    Collection Time: 04/24/21

## 2021-04-26 NOTE — CARE COORDINATION
CM spoke with the admission person at [facility], confirmed that pt was on Skilled level care at the facility. Will accept the patient back on discharge. She will check with the insurance company to find out if patient will need a new percert before going back to the facility. Will call with updates.

## 2021-04-26 NOTE — PROGRESS NOTES
Progress Note - Dr. Murray Jennings - Internal Medicine  PCP: Marcello Doss MD . [address] / Jeanine Beauchamp [phone]    Hospital Day: 0  Code Status: Full Code  Current Diet: DIET CARDIAC;        CC: follow up on medical issues    Subjective:   Soniya Benton is a 80 y.o. female. Doing ok  Going down for echo now  Cannot get further hx nor ros from pt      I have reviewed the patient's medical and social history in detail and updated the computerized patient record. To recap: She  has a past medical history of Anxiety, Diabetic neuropathy ([facility] Dysphagia, Gait disorder, Hyperlipidemia, Hypertension, Meningioma ([facility] Poor memory, TIA (transient ischemic attack), and Type II or unspecified type diabetes mellitus without mention of complication, not stated as uncontrolled. . She  has a past surgical history that includes Cholecystectomy; hysteroscopy; lipoma resection; and Colonoscopy. . She  reports that she has never smoked. She has never used smokeless tobacco. She reports that she does not drink alcohol or use drugs. .        Active Hospital Problems    Diagnosis Date Noted    Syncope and collapse [R55] 04/24/2021    Moderate malnutrition (HCC) [E44.0] 04/14/2021    Failure to thrive in adult [R62.7] 04/13/2021    Late onset Alzheimer's disease without behavioral disturbance (HCC) [G30.1, F02.80] 07/23/2020    Hypertension [I10]     Risk for falls [Z91.81] 01/04/2016    Poor memory [R41.3] 02/17/2015    Meningioma ([facility] [D32.9] 07/05/2012    Diabetic neuropathy ([facility] [E11.40] 05/31/2012    Low back pain [M54.5] 05/31/2012    Gait disorder [R26.9] 08/04/2011    Pure hypercholesterolemia [E78.00] 08/04/2011    Diabetes mellitus type 2, controlled ([facility] [E11.9] 08/03/2011       Current Facility-Administered Medications: aspirin chewable tablet 81 mg, 81 mg, Oral, Daily  clopidogrel (PLAVIX) tablet 75 mg, 75 mg, Oral, Daily  donepezil (ARICEPT) tablet 10 mg, 10 mg, Oral, Nightly  gabapentin (NEURONTIN) capsule 100 mg, 100 mg, Oral, BID  hydroCHLOROthiazide (HYDRODIURIL) tablet 12.5 mg, 12.5 mg, Oral, Daily  lisinopril (PRINIVIL;ZESTRIL) tablet 10 mg, 10 mg, Oral, BID  metFORMIN (GLUCOPHAGE) tablet 500 mg, 500 mg, Oral, BID WC  metoprolol succinate (TOPROL XL) extended release tablet 25 mg, 25 mg, Oral, Daily  pravastatin (PRAVACHOL) tablet 40 mg, 40 mg, Oral, Nightly  triamcinolone (KENALOG) 0.1 % cream, , Topical, BID  enoxaparin (LOVENOX) injection 30 mg, 30 mg, Subcutaneous, Daily  perflutren lipid microspheres (DEFINITY) injection 1.65 mg, 1.5 mL, Intravenous, ONCE PRN         Objective:  /70   Pulse 110   Temp 97.5 °F (36.4 °C) (Oral)   Resp 16   Ht 5' 4\" (1.626 m)   Wt 120 lb 5.9 oz (54.6 kg)   SpO2 97%   BMI 20.66 kg/m²      Patient Vitals for the past 24 hrs:   BP Temp Temp src Pulse Resp SpO2   04/26/21 0404 112/70 97.5 °F (36.4 °C) Oral 110 16 97 %   04/26/21 0008 108/71 98.7 °F (37.1 °C) Oral 97 16 97 %   04/25/21 2100 105/66 98.2 °F (36.8 °C) Oral 100 17 97 %   04/25/21 1745 108/64 97 °F (36.1 °C) Temporal 93 16 98 %   04/25/21 1200 110/69 98.1 °F (36.7 °C) Temporal 97 16 97 %     Patient Vitals for the past 96 hrs (Last 3 readings):   Weight   04/25/21 0154 120 lb 5.9 oz (54.6 kg)   04/24/21 0924 126 lb (57.2 kg)         No intake or output data in the 24 hours ending 04/26/21 0830      Physical Exam:   /70   Pulse 110   Temp 97.5 °F (36.4 °C) (Oral)   Resp 16   Ht 5' 4\" (1.626 m)   Wt 120 lb 5.9 oz (54.6 kg)   SpO2 97%   BMI 20.66 kg/m²   General appearance: alert, appears stated age and cooperative  Head: Normocephalic, without obvious abnormality, atraumatic  Lungs: clear to auscultation bilaterally  Heart: regular rate and rhythm, S1, S2 normal, no murmur, click, rub or gallop  Abdomen: soft, non-tender; bowel sounds normal; no masses,  no organomegaly  Extremities: extremities normal, atraumatic, no cyanosis or edema    Labs:  Lab Results   Component Value Date    WBC 8.7 04/24/2021    HGB 10.1 (L) 04/24/2021    HCT 32.9 (L) 04/24/2021     04/24/2021    CHOL 127 07/30/2020    TRIG 107 07/30/2020    HDL 40 07/30/2020    ALT 20 04/24/2021    AST 27 04/24/2021     04/25/2021    K 4.7 04/25/2021     04/25/2021    CREATININE 0.8 04/25/2021    BUN 15 04/25/2021    CO2 31 04/25/2021    TSH 0.63 08/01/2018    INR 1.13 04/13/2021    GLUF 77 01/04/2021    LABA1C 6.3 04/13/2021    LABMICR Not Indicated 04/24/2021     Lab Results   Component Value Date    TROPONINI 0.04 (H) 04/24/2021       Recent Imaging Results are Reviewed:  Xr Hip Bilateral W Ap Pelvis (2 Views)    Result Date: 4/13/2021  EXAMINATION: ONE XRAY VIEW OF THE PELVIS AND TWO XRAY VIEWS OF EACH OF THE BILATERAL HIPS 4/13/2021 1:19 pm COMPARISON: None. HISTORY: ORDERING SYSTEM PROVIDED HISTORY: fall TECHNOLOGIST PROVIDED HISTORY: Reason for exam:->fall Reason for Exam: Fall (pt family member states pt with multiple recent falls- states lives by herself, but daughter lives close by and helps her- daughter states she is unable to take care of her full time would possibly like nursing home placement) Acuity: Acute Type of Exam: Initial FINDINGS: Evaluation of the pelvis limited due to limitations in patient positioning. Limited evaluation of the pubic symphysis and sacrum. Question a nondisplaced fracture of the right inferior pubic ramus. No widening of the SI joints bilaterally. Subchondral sclerosis with marginal osteophytes seen in the SI joints bilaterally No evidence of a proximal femoral fracture dislocation. Vascular calcifications are noted. Degenerative changes seen in the lower lumbar spine     Limited exam. Question a fracture involving the right inferior pubic rami. Consider CT of the pelvis as this exam is limited for detecting fractures.      Ct Head Wo Contrast    Result Date: 4/24/2021  EXAMINATION: CT OF THE HEAD WITHOUT CONTRAST; CT OF THE CERVICAL SPINE WITHOUT CONTRAST 4/24/2021 10:59 am; 4/24/2021 10:58 am TECHNIQUE: CT of the head was performed without the administration of intravenous contrast. Dose modulation, iterative reconstruction, and/or weight based adjustment of the mA/kV was utilized to reduce the radiation dose to as low as reasonably achievable.; CT of the cervical spine was performed without the administration of intravenous contrast. Multiplanar reformatted images are provided for review. Dose modulation, iterative reconstruction, and/or weight based adjustment of the mA/kV was utilized to reduce the radiation dose to as low as reasonably achievable. COMPARISON: 04/13/2021 HISTORY: ORDERING SYSTEM PROVIDED HISTORY: fall TECHNOLOGIST PROVIDED HISTORY: Reason for exam:->fall Has a \"code stroke\" or \"stroke alert\" been called? ->No Decision Support Exception->Emergency Medical Condition (MA) Reason for Exam: Fall ([place] [facility], [facility] [place] [place] on floor. pt has hx of dementia no obvious deformity.) Type of Exam: Initial; ORDERING SYSTEM PROVIDED HISTORY: fall TECHNOLOGIST PROVIDED HISTORY: Reason for exam:->fall Decision Support Exception->Emergency Medical Condition (MA) Reason for Exam: Fall ([place] [facility], [place] found pt on floor. pt has hx of dementia no obvious deformity.) Acuity: Acute Type of Exam: Initial FINDINGS: HEAD: BRAIN/VENTRICLES: There is no acute intracranial hemorrhage, mass effect or midline shift. No abnormal extra-axial fluid collection. Cortical atrophy and chronic white matter changes in the brain and associated ventricular enlargement are again demonstrated. ORBITS: The visualized portion of the orbits demonstrate no acute abnormality. SINUSES: The visualized paranasal sinuses and mastoid air cells demonstrate no acute abnormality. SOFT TISSUES/SKULL: No acute abnormality of the visualized skull or soft tissues.  CERVICAL SPINE: BONES/ALIGNMENT: There is no evidence of an acute cervical spine amount of chronic ischemic change also appropriate for age. No significant change from the prior study. Ct Cervical Spine Wo Contrast    Result Date: 4/24/2021  EXAMINATION: CT OF THE HEAD WITHOUT CONTRAST; CT OF THE CERVICAL SPINE WITHOUT CONTRAST 4/24/2021 10:59 am; 4/24/2021 10:58 am TECHNIQUE: CT of the head was performed without the administration of intravenous contrast. Dose modulation, iterative reconstruction, and/or weight based adjustment of the mA/kV was utilized to reduce the radiation dose to as low as reasonably achievable.; CT of the cervical spine was performed without the administration of intravenous contrast. Multiplanar reformatted images are provided for review. Dose modulation, iterative reconstruction, and/or weight based adjustment of the mA/kV was utilized to reduce the radiation dose to as low as reasonably achievable. COMPARISON: 04/13/2021 HISTORY: ORDERING SYSTEM PROVIDED HISTORY: fall TECHNOLOGIST PROVIDED HISTORY: Reason for exam:->fall Has a \"code stroke\" or \"stroke alert\" been called? ->No Decision Support Exception->Emergency Medical Condition (MA) Reason for Exam: Fall ([place] [facility], [place] on floor. pt has hx of dementia no obvious deformity.) Type of Exam: Initial; ORDERING SYSTEM PROVIDED HISTORY: fall TECHNOLOGIST PROVIDED HISTORY: Reason for exam:->fall Decision Support Exception->Emergency Medical Condition (MA) Reason for Exam: Fall ([place] [facility], [place] found pt on floor. pt has hx of dementia no obvious deformity.) Acuity: Acute Type of Exam: Initial FINDINGS: HEAD: BRAIN/VENTRICLES: There is no acute intracranial hemorrhage, mass effect or midline shift. No abnormal extra-axial fluid collection. Cortical atrophy and chronic white matter changes in the brain and associated ventricular enlargement are again demonstrated. ORBITS: The visualized portion of the orbits demonstrate no acute abnormality. SINUSES: The visualized paranasal sinuses and mastoid air cells demonstrate no acute abnormality. SOFT TISSUES/SKULL: No acute abnormality of the visualized skull or soft tissues. CERVICAL SPINE: BONES/ALIGNMENT: There is no evidence of an acute cervical spine fracture. Circumscribed areas of lucency in the C2 and C6 vertebral bodies are again demonstrated. There is normal alignment of the cervical spine. DEGENERATIVE CHANGES: No significant degenerative changes. SOFT TISSUES: There is no prevertebral soft tissue swelling. Patulous esophagus. Chronic findings in the brain without acute CT abnormality identified. No acute osseous abnormality identified in the cervical spine. Ct Cervical Spine Wo Contrast    Result Date: 4/13/2021  EXAMINATION: CT OF THE CERVICAL SPINE WITHOUT CONTRAST 4/13/2021 12:49 pm TECHNIQUE: CT of the cervical spine was performed without the administration of intravenous contrast. Multiplanar reformatted images are provided for review. Dose modulation, iterative reconstruction, and/or weight based adjustment of the mA/kV was utilized to reduce the radiation dose to as low as reasonably achievable. COMPARISON: None. HISTORY: ORDERING SYSTEM PROVIDED HISTORY: fall TECHNOLOGIST PROVIDED HISTORY: Reason for exam:->fall Decision Support Exception->Emergency Medical Condition (MA) Reason for Exam: fall Acuity: Acute Type of Exam: Initial FINDINGS: BONES/ALIGNMENT: There is no acute fracture or traumatic malalignment. DEGENERATIVE CHANGES: No significant degenerative changes considering patient's age. There is narrowing and mild spurring C6-7. SOFT TISSUES: There is no prevertebral soft tissue swelling. No acute abnormality of the cervical spine.      Ct Pelvis Wo Contrast Additional Contrast? None    Result Date: 4/14/2021  EXAMINATION: CT OF THE PELVIS WITHOUT CONTRAST 4/13/2021 3:58 pm TECHNIQUE: CT of the pelvis was performed without the administration of intravenous contrast. Multiplanar reformatted images are provided for review. Dose modulation, iterative reconstruction, and/or weight based adjustment of the mA/kV was utilized to reduce the radiation dose to as low as reasonably achievable. COMPARISON: Plain films performed earlier today. HISTORY: ORDERING SYSTEM PROVIDED HISTORY: r/o hip fx and rami fx TECHNOLOGIST PROVIDED HISTORY: Reason for exam:->r/o hip fx and rami fx Additional Contrast?->None Decision Support Exception->Emergency Medical Condition (MA) Reason for Exam: r/o hip fx and rami fx. Acuity: Acute Type of Exam: Initial FINDINGS: The bones are diffusely osteopenic. There is no evidence of acute fracture of the pelvis or either hip. Mild symmetric osteoarthritic changes of the SI joints. Moderate degenerative change at the pubic symphysis. There is minimal symmetric osteoarthritic changes of the hips. Degenerative disc disease at L4-5 and L5-S1 with lower lumbar facet arthropathy. Grade 1 anterolisthesis of L4 on L5 and L5 on S1 is noted. There is mild edematous changes in the subcutaneous fat with no focal subcutaneous abnormality. Within the pelvis, colonic diverticulosis is noted in the sigmoid colon. There is a probable diverticulum in the cecal region with marked circumferential wall thickening measuring up to 1.3 cm. Cystic area in the left adnexa measuring 1.8 cm. No free pelvic fluid. Partial distention of the urinary bladder. Multiple calcified pelvic phleboliths. Iliac and femoral vascular calcification. 1. No evidence of acute pelvic or hip fracture. Diffuse osteopenia. Degenerative changes in the lower lumbar spine, SI joints and pubic symphysis. 2. Probable colonic diverticulum adjacent to the cecum with circumferential wall thickening. Findings may be related to a chronic infectious or inflammatory process although neoplasm is a possibility. Colonic diverticulosis throughout the remainder of the visualized colon with no acute features. Xr Chest Portable    Result Date: 4/13/2021  EXAMINATION: ONE XRAY VIEW OF THE CHEST 4/13/2021 1:19 pm COMPARISON: None. HISTORY: ORDERING SYSTEM PROVIDED HISTORY: SOB TECHNOLOGIST PROVIDED HISTORY: Reason for exam:->SOB Reason for Exam: Fall (pt family member states pt with multiple recent falls- states lives by herself, but daughter lives close by and helps her- daughter states she is unable to take care of her full time would possibly like nursing home placement) Acuity: Acute Type of Exam: Initial FINDINGS: The cardiomediastinal silhouette is borderline in size. The lungs are clear. No infiltrate, pleural fluid or evidence of overt failure. Osseous structures are intact     No acute cardiopulmonary disease. Xr Pelvis (min 3 Views)    Result Date: 4/24/2021  EXAMINATION: ONE XRAY VIEW OF THE PELVIS 4/24/2021 12:50 pm COMPARISON: None. HISTORY: ORDERING SYSTEM PROVIDED HISTORY: fall TECHNOLOGIST PROVIDED HISTORY: Reason for exam:->fall Reason for Exam: Fall [facility]. pt has hx of dementia no obvious deformity.) Tech held pt unwilling to lay flat Acuity: Unknown Type of Exam: Unknown FINDINGS: There is no evidence of acute fracture. There is normal alignment. No acute joint abnormality. No focal osseous lesion. No focal soft tissue abnormality. No acute osseous abnormality. Assessment and Plan:  Principal Problem:    Syncope and collapse -Established problem. Stable. Plan: ECHO pending. Await results  Active Problems:    Diabetes mellitus type 2, controlled ([address] -Established problem. Stable. Sugars ok  Plan: cont ccc diet, sliding scale, home meds    Pure hypercholesterolemia -Established problem. Stable. Plan: cont statin    Gait disorder  Plan: pt/ot rec SNF    Diabetic neuropathy ([address]    Low back pain    Meningioma ([address]    Poor memory    Risk for falls    Hypertension -Established problem. Stable.   112/70  Plan: cont same meds    Late onset Alzheimer's disease without behavioral disturbance (HCC)    Failure to thrive in adult    Moderate malnutrition (HCC)      Disp - likely needs SNF.  Will ask social work to assist          Kathi Gold  4/26/2021

## 2021-04-26 NOTE — PROGRESS NOTES
Speech Language Pathology  Dysphagia Treatment Note    Name:  Lester Gloria  :   1931  Medical Diagnosis:  Syncope and collapse [R55]  Treatment Diagnosis: Oropharyngeal Dysphagia  Pain level:  Pt denied    Current Diet Level: Regular texture diet with thin liquids, meds with puree   Tolerance of Current Diet Level: Per chart review no noted difficulty. Pt's daughter reports she noticed pt was taking more food out of her mouth during meals today (recently lost upper dentures/ misplaced in hospital). Pt's daughter reports pt was on a regular texture diet when she was living with her but she was moving towards more softer food items. Reports at home she was taking her meds whole in yogurt but today they gave pt pill they were unable to crush in applesauce and she had a little more difficulty getting it down. Assessment of Texture Tolerance:  -Impressions: Pt found reclined back in bed, alert, with daughter present providing supplemental hx for pt. Daughter denies any previous instances of pna. Pt only willing to accept few trials of thins via straw this date. Pt requested HOB be placed more upright for trials. Thins via straw revealed suspected premature loss to pharynx, delayed swallow initiation with occasional double swallows to clear and belching post swallows. Pt did produce x 1 delayed cough following trials, could be indicative of reduced pharyngeal clearance vs penetration/ aspiration. Will continue to monitor diet tolerance. Diet and Treatment Recommendations:  - Continue with recommended diet (if difficulty continues with regular solids recommend to downgrade to a dental soft diet vs a dysphagia diet)  - Continue with thins  - Meds crushed in puree     ST.) Pt will tolerate recommended diet without s/s of aspiration.  2021 ongoing     Plan: 1-2 more f/us to ensure diet tolerance/ dysphagia POC     Patient/Family Education:Education given to the Pt (no evidence of learning) and daughter, who verbalized understanding    Discharge Recommendations:  Pt will benefit from continued skilled Speech Therapy for Dysphagia services, prior to d/c. Timed Code Treatment: 0     Total Treatment Time: 8     If patient discharges prior to next session this note will serve as a discharge summary. Signature:   Daniel Lopez #[id number] 4/26/2021 1:41 PM  Speech-Language Pathologist

## 2021-04-27 VITALS
HEIGHT: 64 IN | HEART RATE: 100 BPM | RESPIRATION RATE: 16 BRPM | SYSTOLIC BLOOD PRESSURE: 93 MMHG | BODY MASS INDEX: 20.55 KG/M2 | WEIGHT: 120.37 LBS | DIASTOLIC BLOOD PRESSURE: 52 MMHG | OXYGEN SATURATION: 97 % | TEMPERATURE: 99.2 F

## 2021-04-27 LAB
ANION GAP SERPL CALCULATED.3IONS-SCNC: 6 MMOL/L (ref 3–16)
BASOPHILS ABSOLUTE: 0.1 K/UL (ref 0–0.2)
BASOPHILS RELATIVE PERCENT: 1.2 %
BUN BLDV-MCNC: 14 MG/DL (ref 7–20)
CALCIUM SERPL-MCNC: 9.4 MG/DL (ref 8.3–10.6)
CHLORIDE BLD-SCNC: 107 MMOL/L (ref 99–110)
CO2: 30 MMOL/L (ref 21–32)
CREAT SERPL-MCNC: 0.9 MG/DL (ref 0.6–1.2)
EOSINOPHILS ABSOLUTE: 0.1 K/UL (ref 0–0.6)
EOSINOPHILS RELATIVE PERCENT: 1.7 %
GFR AFRICAN AMERICAN: >60
GFR NON-AFRICAN AMERICAN: 59
GLUCOSE BLD-MCNC: 102 MG/DL (ref 70–99)
GLUCOSE BLD-MCNC: 68 MG/DL (ref 70–99)
GLUCOSE BLD-MCNC: 68 MG/DL (ref 70–99)
GLUCOSE BLD-MCNC: 82 MG/DL (ref 70–99)
GLUCOSE BLD-MCNC: 85 MG/DL (ref 70–99)
HCT VFR BLD CALC: 26.4 % (ref 36–48)
HEMOGLOBIN: 8.4 G/DL (ref 12–16)
LYMPHOCYTES ABSOLUTE: 1.8 K/UL (ref 1–5.1)
LYMPHOCYTES RELATIVE PERCENT: 28 %
MCH RBC QN AUTO: 30.3 PG (ref 26–34)
MCHC RBC AUTO-ENTMCNC: 32 G/DL (ref 31–36)
MCV RBC AUTO: 94.8 FL (ref 80–100)
MONOCYTES ABSOLUTE: 0.6 K/UL (ref 0–1.3)
MONOCYTES RELATIVE PERCENT: 10 %
NEUTROPHILS ABSOLUTE: 3.8 K/UL (ref 1.7–7.7)
NEUTROPHILS RELATIVE PERCENT: 59.1 %
PDW BLD-RTO: 17.6 % (ref 12.4–15.4)
PERFORMED ON: ABNORMAL
PERFORMED ON: NORMAL
PLATELET # BLD: 290 K/UL (ref 135–450)
PMV BLD AUTO: 7.8 FL (ref 5–10.5)
POTASSIUM SERPL-SCNC: 4.6 MMOL/L (ref 3.5–5.1)
RBC # BLD: 2.79 M/UL (ref 4–5.2)
SODIUM BLD-SCNC: 143 MMOL/L (ref 136–145)
WBC # BLD: 6.4 K/UL (ref 4–11)

## 2021-04-27 PROCEDURE — 6370000000 HC RX 637 (ALT 250 FOR IP): Performed by: INTERNAL MEDICINE

## 2021-04-27 PROCEDURE — 92526 ORAL FUNCTION THERAPY: CPT

## 2021-04-27 PROCEDURE — 85025 COMPLETE CBC W/AUTO DIFF WBC: CPT

## 2021-04-27 PROCEDURE — G0378 HOSPITAL OBSERVATION PER HR: HCPCS

## 2021-04-27 PROCEDURE — 97530 THERAPEUTIC ACTIVITIES: CPT

## 2021-04-27 PROCEDURE — 96372 THER/PROPH/DIAG INJ SC/IM: CPT

## 2021-04-27 PROCEDURE — 80048 BASIC METABOLIC PNL TOTAL CA: CPT

## 2021-04-27 PROCEDURE — 6360000002 HC RX W HCPCS: Performed by: INTERNAL MEDICINE

## 2021-04-27 RX ADMIN — HYDROCHLOROTHIAZIDE 12.5 MG: 25 TABLET ORAL at 08:48

## 2021-04-27 RX ADMIN — TRIAMCINOLONE ACETONIDE: 1 CREAM TOPICAL at 08:46

## 2021-04-27 RX ADMIN — ENOXAPARIN SODIUM 30 MG: 30 INJECTION SUBCUTANEOUS at 08:46

## 2021-04-27 RX ADMIN — METFORMIN HYDROCHLORIDE 500 MG: 500 TABLET ORAL at 08:48

## 2021-04-27 RX ADMIN — METOPROLOL SUCCINATE 25 MG: 25 TABLET, EXTENDED RELEASE ORAL at 08:48

## 2021-04-27 RX ADMIN — CLOPIDOGREL BISULFATE 75 MG: 75 TABLET ORAL at 08:47

## 2021-04-27 RX ADMIN — LISINOPRIL 10 MG: 10 TABLET ORAL at 08:47

## 2021-04-27 RX ADMIN — GABAPENTIN 100 MG: 100 CAPSULE ORAL at 08:47

## 2021-04-27 RX ADMIN — ASPIRIN 81 MG: 81 TABLET, CHEWABLE ORAL at 08:47

## 2021-04-27 ASSESSMENT — PAIN SCALES - GENERAL
PAINLEVEL_OUTOF10: 0

## 2021-04-27 NOTE — PROGRESS NOTES
benefit from further cognitive evaluation upon d/c as pt's cognition appears to be impacting her intake. Timed Code Treatment: 0     Total Treatment Time: 15    If patient discharges prior to next session this note will serve as a discharge summary. Signature:   Colin Parnell M.A.  CCC-SLP S.PLilia [id number]  Speech-Language Pathologist [phone]  4/27/2021 3:11 PM

## 2021-04-27 NOTE — PROGRESS NOTES
Report called to [facility]. All questions answered.

## 2021-04-27 NOTE — CARE COORDINATION
ANIL spoke with the admission person at [facility] regarding precert status  she stated it is still pending . Will call back with updates.

## 2021-04-27 NOTE — PROGRESS NOTES
protein-energy intake as evidenced by intake 0-25%, poor intake prior to admission, weight loss, mild loss of subcutaneous fat, moderate muscle loss    · Increased nutrient needs related to increase demand for energy/nutrients as evidenced by wounds      Nutrition Interventions:   Food and/or Nutrient Delivery:  Continue Current Diet, Start Oral Nutrition Supplement  Nutrition Education/Counseling:  Education not indicated   Coordination of Nutrition Care:  Continue to monitor while inpatient    Goals:  po intake at least 50% of meals & supplements       Nutrition Monitoring and Evaluation:   Behavioral-Environmental Outcomes:  None Identified   Food/Nutrient Intake Outcomes:  Food and Nutrient Intake, Supplement Intake  Physical Signs/Symptoms Outcomes:  Chewing or Swallowing, Weight     Discharge Planning:    Continue Oral Nutrition Supplement     Electronically signed by Reji Hector RD, LD on 4/27/21 at 10:34 AM EDT    Contact: [phone]

## 2021-04-27 NOTE — PROGRESS NOTES
Data- discharge order received, family (appointed legal authority) verbalized agreement to discharge, disposition to [phone], [address] [place] reviewed and signed by physician. Action- AVS prepared, ETELVINA completed/ reported faxed by case management/. Discharge instruction summary: Diet- cardiac, Activity- as tolerated, immunizations reviewed, medications prescriptions to be filled at receiving facility except for the controlled prescriptions to be sent: pt preferred pharmacy, Transfer code status: Full Code, LDAs to remain with discharge: none. DME used: none. Response- Bedside RN to call report to receiving facility. Pt belongings gathered, peripheral IV and cardiac monitoring removed. Disposition to Discharged via cart/stretcher to skilled nursing by EMS transportation, no complications reported.

## 2021-04-27 NOTE — DISCHARGE INSTR - COC
Continuity of Care Form    Patient Name: Dara Tucker   :  1931  MRN:  [id number]    Admit date:  2021  Discharge date:  2021    Code Status Order: Full Code   Advance Directives:   Advance Care Flowsheet Documentation       Date/Time Healthcare Directive Type of Healthcare Directive Copy in [address] Agent's Name Healthcare Agent's Phone Number    21 1522  Yes, patient has an advance directive for healthcare treatment  Durable power of  for health care;Living will  No, copy requested from family  Adult [address]  [phone]            Admitting Physician:  Alexandro Abbasi MD  PCP: Anayeli Verduzco MD    Discharging Nurse: [facility] Final Unit/Room#: 7JG-1740/4592-51  Discharging Unit Phone Number: [phone]    Emergency Contact:   Extended Emergency Contact Information  Primary Emergency Contact: Angel Connell, [address] Phone: [phone]  Relation: Child    Past Surgical History:  Past Surgical History:   Procedure Laterality Date    CHOLECYSTECTOMY      COLONOSCOPY      HYSTEROSCOPY      LIPOMA RESECTION         Immunization History:   Immunization History   Administered Date(s) Administered    Influenza, Quadv, adjuvanted, 65 yrs +, IM, PF (Fluad) 2020    Pneumococcal Conjugate 13-valent (Lenetta Primrose) 2015    Pneumococcal Polysaccharide ([id number]) 2013       Active Problems:  Patient Active Problem List   Diagnosis Code    Diabetes mellitus type 2, controlled ([facility] E11.9    Pure hypercholesterolemia E78.00    Gait disorder R26.9    Diabetic neuropathy ([facility] E11.40    Low back pain M54.5    Meningioma ([facility] D32.9    Poor memory R41.3    Risk for falls Z91.81    Hypertension I10    Late onset Alzheimer's disease without behavioral disturbance (HCC) G30.1, F02.80    Failure to thrive in adult R62.7    Moderate malnutrition Cleansed Wound cleanser 04/26/21 1200   Dressing/Treatment Foam 04/26/21 1200   Wound Assessment Pink/red 04/26/21 1200   Drainage Amount None 04/26/21 1200   Odor None 04/26/21 1200   Lauryn-wound Assessment Excoriated 04/25/21 1202   Number of days: 13       Wound 04/13/21 Groin Left to the left of left labia (Active)   Wound Image   04/15/21 1751   Wound Etiology Other 04/25/21 1202   Wound Cleansed Wound cleanser 04/26/21 1200   Dressing/Treatment Open to air 04/26/21 1200   Wound Length (cm) 0.25 cm 04/13/21 2130   Wound Width (cm) 0.25 cm 04/13/21 2130   Wound Depth (cm) 0.25 cm 04/13/21 2130   Wound Surface Area (cm^2) 0.06 cm^2 04/13/21 2130   Wound Volume (cm^3) 0.02 cm^3 04/13/21 2130   Wound Assessment Dry 04/26/21 1200   Drainage Amount None 04/26/21 1200   Odor None 04/26/21 1200   Lauryn-wound Assessment Intact 04/26/21 1200   Number of days: 13       Wound 04/13/21 Groin Anterior; Left (Active)   Wound Etiology Other 04/25/21 1202   Wound Cleansed Wound cleanser 04/26/21 1200   Dressing/Treatment Open to air 04/26/21 1200   Wound Length (cm) 1 cm 04/13/21 2130   Wound Width (cm) 0.25 cm 04/13/21 2130   Wound Surface Area (cm^2) 0.25 cm^2 04/13/21 2130   Wound Assessment Dry 04/26/21 1200   Drainage Amount None 04/26/21 1200   Drainage Description Purulent 04/25/21 1202   Odor None 04/26/21 1200   Lauryn-wound Assessment Intact 04/26/21 1200   Number of days: 13       Wound 04/13/21 Buttocks Left (Active)   Wound Etiology Pressure Stage  2 04/25/21 1202   Dressing Status New dressing applied 04/26/21 1200   Wound Cleansed Wound cleanser 04/26/21 1200   Dressing/Treatment Foam 04/26/21 1200   Wound Assessment Pink/red 04/26/21 1200   Drainage Amount None 04/26/21 1200   Odor None 04/26/21 1200   Lauryn-wound Assessment Excoriated 04/26/21 1200   Number of days: 13        Elimination:  Continence:   · Bowel: No  · Bladder: No  Urinary Catheter: None   Colostomy/Ileostomy/Ileal Conduit: ***       Date of Last BM: ***    Intake/Output Summary (Last 24 hours) at 4/27/2021 0820  Last data filed at 4/27/2021 0459  Gross per 24 hour   Intake 90 ml   Output --   Net 90 ml     I/O last 3 completed shifts: In: 80 [P.O.:90]  Out: -     Safety Concerns: At Risk for Falls    Impairments/Disabilities:      None    Nutrition Therapy:  Current Nutrition Therapy:   - Oral Diet:  Dental Soft    Routes of Feeding: Oral  Liquids: Thin Liquids  Daily Fluid Restriction: no  Last Modified Barium Swallow with Video (Video Swallowing Test): not done    Treatments at the Time of Hospital Discharge:   Respiratory Treatments: ***  Oxygen Therapy:  is not on home oxygen therapy.   Ventilator:    - No ventilator support    Rehab Therapies: Physical Therapy, Occupational Therapy and Speech/Language Therapy  Weight Bearing Status/Restrictions: No weight bearing restirctions  Other Medical Equipment (for information only, NOT a DME order):  wheelchair  Other Treatments: ***    Patient's personal belongings (please select all that are sent with patient):  None    RN SIGNATURE:  Electronically signed by Ayala Castañeda RN on 4/27/21 at 3:47 PM EDT    CASE MANAGEMENT/SOCIAL WORK SECTION    Inpatient Status Date:     Readmission Risk Assessment Score:  Readmission Risk              Risk of Unplanned Readmission:        0           Discharging to Facility/ Agency   Name:  Discharge Facility: [facility]  Phone: [phone]  Fax: [phone]  ·   · Address:  · Phone:  · Fax:    Dialysis Facility (if applicable)   · Name:  · Address:  · Dialysis Schedule:  · Phone:  · Fax:    / signature: Electronically signed by Desiree Kahn RN on 4/27/21 at 3:04 PM EDT    PHYSICIAN SECTION    Prognosis: Guarded    Condition at Discharge: Stable    Rehab Potential (if transferring to Rehab): Guarded    Recommended Labs or Other Treatments After Discharge: ***    Physician Certification: I certify the above information and transfer of Archer Scheuermann Destiny Leger  is necessary for the continuing treatment of the diagnosis listed and that she requires [place] for greater 30 days.      Update Admission H&P: No change in H&P    PHYSICIAN SIGNATURE:  Electronically signed by Anabela Anna MD on 4/27/21 at 8:20 AM EDT

## 2021-04-27 NOTE — PROGRESS NOTES
Physical Therapy  Facility/Department: [address] NURSING  Daily Treatment Note  NAME: Isidoro Myers  : 1931  MRN: [id number]    Date of Service: 2021    Discharge Recommendations: Isidoro Myers scored a 9/24 on the AM-PAC short mobility form. Current research shows that an AM-PAC score of 17 or less is typically not associated with a discharge to the patient's home setting. Based on the patient's AM-PAC score and their current functional mobility deficits, it is recommended that the patient have 3-5 sessions per week of Physical Therapy at d/c to increase the patient's independence. Please see assessment section for further patient specific details. If patient discharges prior to next session this note will serve as a discharge summary. Please see below for the latest assessment towards goals. 3-5 sessions per week   PT Equipment Recommendations  Equipment Needed: No    Assessment   Body structures, Functions, Activity limitations: Decreased functional mobility ; Decreased strength;Decreased endurance;Decreased safe awareness;Decreased balance  Assessment: Pt continues to present below baseline functioning. Pt continues to require max A x2 for bed mobility but is SBA for seated balance. Pt was able to complete bed>recliner transfers using stedy with max A x2. Pt would continue to benefit from skilled therapy in order to improve functional mobility and safely return to [facility]. Treatment Diagnosis: decreased functional mobility, impiared gait, decreased balance  Prognosis: Fair  Decision Making: Medium Complexity  Clinical Presentation: evolving  PT Education: Plan of Care;Goals; General Safety; Functional Mobility Training;Orientation;PT Role  Patient Education: Pt verbalized understanding  Barriers to Learning: cognitive  REQUIRES PT FOLLOW UP: Yes  Activity Tolerance  Activity Tolerance: Patient limited by cognitive status; Patient Tolerated treatment well;Patient limited by fatigue;Patient limited by endurance     Patient Diagnosis(es): The primary encounter diagnosis was Fall from bed, initial encounter. A diagnosis of Elevated troponin was also pertinent to this visit. has a past medical history of Anxiety, Diabetic neuropathy ([address] Dysphagia, Gait disorder, Hyperlipidemia, Hypertension, Meningioma ([address] Poor memory, TIA (transient ischemic attack), and Type II or unspecified type diabetes mellitus without mention of complication, not stated as uncontrolled. has a past surgical history that includes Cholecystectomy; hysteroscopy; lipoma resection; and Colonoscopy. Restrictions  Restrictions/Precautions  Restrictions/Precautions: Fall Risk(high fall risk)  Required Braces or Orthoses?: No  Position Activity Restriction  Other position/activity restrictions: The patient is a 80-year-old elderly, blackAmerican lady with an unwitnessed syncope and a fall, was brought in forfurther evaluation. She arrived from [facility] via EMS. She doeshave a history of dementia. No obvious deformity. She is a poorhistorian and overall denied any chest pain. She was found on the floorin the extended care facility. It is unclear what happened but we weretold this morning by the F that the patient was severely demented andwas found on the floor with an unwitnessed syncope. The patient deniedany chest pain, denied any shortness of breath. Subjective   General  Chart Reviewed: Yes  Response To Previous Treatment: Patient with no complaints from previous session. Family / Caregiver Present: No  Subjective  Subjective: Pt reports no pain at this time. General Comment  Comments: supine in bed upon arrival. pt agreeble to therapy  Pain Screening  Patient Currently in Pain: Denies  Vital Signs  Patient Currently in Pain: Denies       Orientation  Orientation  Overall Orientation Status: Impaired  Orientation Level: Oriented to situation;Oriented to person;Disoriented to place; Disoriented to time  Cognition   Cognition  Overall Cognitive Status: WFL  Arousal/Alertness: Delayed responses to stimuli  Following Commands: Inconsistently follows commands; Follows one step commands with increased time; Follows one step commands with repetition  Attention Span: Attends with cues to redirect; Difficulty attending to directions; Difficulty dividing attention  Memory: Decreased recall of biographical Information;Decreased short term memory;Decreased long term memory  Safety Judgement: Decreased awareness of need for assistance;Decreased awareness of need for safety  Problem Solving: Decreased awareness of errors  Insights: Decreased awareness of deficits  Initiation: Requires cues for all  Sequencing: Requires cues for all  Cognition Comment: Patient easily distracted and minimal verbal cueing needed for redirection. Patient with minimal verbal and physical cueing needed for sequencing and initiation of functional activties  Objective   Bed mobility  Supine to Sit: Maximum assistance;2 Person assistance(Max A x2)  Sit to Supine: Maximum assistance;2 Person assistance(Max A x2)  Scooting: Maximal assistance;2 Person assistance(Max A x2)  Comment: HOB elevated, grab bars, pt attempted to move her legs to side of bed and reach for grab bars  Transfers  Sit to Stand: 2 Person Assistance;Maximum Assistance(Max A x2 with stedy)  Stand to sit: 2 Person Assistance;Maximum Assistance(Max A x2 with Stedy)  Bed to Chair: Maximum assistance;2 Person Assistance(max A x2 with Stedy)  Comment: Max A x2 with additional therapist guarding the stedy and providing anterior pull on gait belt in order to perform sit>stand from EOB with Stedy.  Max A x2 in order to complete stand>sit from stedy  Ambulation  Ambulation?: No  Stairs/Curb  Stairs?: No     Balance  Posture: Fair  Sitting - Static: Good;-(SBA)  Sitting - Dynamic: Fair;+(SBA-CGA)  Standing - Static: Poor(with Stedy)  Standing - Dynamic: Poor(with stedy, unable to maintain standing for >~5 secs)                 AM-PAC Score  AM-PAC Inpatient Mobility Raw Score : 9 (04/26/21 1450)  AM-PAC Inpatient T-Scale Score : 30.55 (04/26/21 1450)  Mobility Inpatient CMS 0-100% Score: 81.38 (04/26/21 1450)  Mobility Inpatient CMS G-Code Modifier : CM (04/26/21 1450)          Goals  Short term goals  Time Frame for Short term goals: To be met prior to discharge  Short term goal 1: Bed mobility with min A  Short term goal 2: Sit to/from stand with max A  Short term goal 3: Bed to/from chair with max A  Patient Goals   Patient goals : unable to state  NO GOALS MET THIS DATE   Plan    Plan  Times per week: 3-5  Times per day: Daily  Current Treatment Recommendations: Strengthening, Balance Training, Functional Mobility Training, Transfer Training, Gait Training, Safety Education & Training, Home Exercise Program  Safety Devices  Type of devices: Left in chair, All fall risk precautions in place, Call light within reach, Chair alarm in place, Gait belt, Patient at risk for falls, Nurse notified  Restraints  Initially in place: No     Therapy Time   Individual Concurrent Group Co-treatment   Time In       0816   Time Out       0848   Minutes       32        Timed Code Treatment Minutes: 17 (observation, mins split with OT)   Total Treatment Minutes:  28    Brooke Branham, SPT   PT observed and directed the above evaluation/treatment.   [address] [place], DPT [id number]

## 2021-04-27 NOTE — DISCHARGE SUMMARY
[facility] -- Physician Discharge Summary     Soha Myers  1/16/1931  MRN: [id number]    Admit Date: 4/24/2021  Discharge Date: No discharge date for patient encounter. Attending MD: Rehan Sweeney MD  Discharging MD: Rehan Sweeney MD  PCP: Jessica Santiago MD . Dimitry More  / [place] [address] [phone]    Admission Diagnosis: Syncope and collapse [R55]  DISCHARGE DIAGNOSIS: same    Full Hospital Problem List:  C/[facility] [address] Problems    Diagnosis Date Noted    Syncope and collapse [R55] 04/24/2021    Moderate malnutrition ([address] [E44.0] 04/14/2021    Failure to thrive in adult [R62.7] 04/13/2021    Late onset Alzheimer's disease without behavioral disturbance ([address] [G30.1, F02.80] 07/23/2020    Hypertension [I10]     Risk for falls [Z91.81] 01/04/2016    Poor memory [R41.3] 02/17/2015    Meningioma ([address] [D32.9] 07/05/2012    Diabetic neuropathy ([address] [E11.40] 05/31/2012    Low back pain [M54.5] 05/31/2012    Gait disorder [R26.9] 08/04/2011    Pure hypercholesterolemia [E78.00] 08/04/2011    Diabetes mellitus type 2, controlled ([address] [E11.9] 08/03/2011           Hospital Course:  80-year-old elderly,  lady with an unwitnessed syncope and a fall, was brought in forfurther evaluation. She arrived from [facility] via EMS. She does have a history of dementia. No obvious deformity. She is a poor historian and overall denied any chest pain. She was found on the floor in the extended care facility. It is unclear what happened but we were told  by the E[facility] that the patient was severely demented and was found on the floor with an unwitnessed syncope.     Workup is essentially negative  CT Head shows no stroke  Pt has no recurrence while here    PT/OT has seen patient and they recommend return to SNF      Consults made during Hospitalization:  IP CONSULT TO INTERNAL MEDICINE  IP CONSULT TO PALLIATIVE CARE  IP CONSULT TO SOCIAL WORK  IP CONSULT TO SPIRITUAL SERVICES    Treatment team at time of Discharge: Treatment Team: Attending Provider: Rian Gomez MD; Consulting Physician: Rian Gomez MD; Consulting Physician: Precious Villarreal, RN; : Carter Lim, RN; Utilization Reviewer: Rhea Bee, RN; Registered Nurse: Fozia Joaquin, RN; Registered Nurse: Priyanka Miller, RN; Respiratory Therapist (Day): Jose Falcon RCP    Imaging Results:  Xr Hip Bilateral W Ap Pelvis (2 Views)    Result Date: 4/13/2021  EXAMINATION: ONE XRAY VIEW OF THE PELVIS AND TWO XRAY VIEWS OF EACH OF THE BILATERAL HIPS 4/13/2021 1:19 pm COMPARISON: None. HISTORY: ORDERING SYSTEM PROVIDED HISTORY: fall TECHNOLOGIST PROVIDED HISTORY: Reason for exam:->fall Reason for Exam: Fall (pt family member states pt with multiple recent falls- states lives by herself, but daughter lives close by and helps her- daughter states she is unable to take care of her full time would possibly like nursing home placement) Acuity: Acute Type of Exam: Initial FINDINGS: Evaluation of the pelvis limited due to limitations in patient positioning. Limited evaluation of the pubic symphysis and sacrum. Question a nondisplaced fracture of the right inferior pubic ramus. No widening of the SI joints bilaterally. Subchondral sclerosis with marginal osteophytes seen in the SI joints bilaterally No evidence of a proximal femoral fracture dislocation. Vascular calcifications are noted. Degenerative changes seen in the lower lumbar spine     Limited exam. Question a fracture involving the right inferior pubic rami. Consider CT of the pelvis as this exam is limited for detecting fractures.      Ct Head Wo Contrast    Result Date: 4/24/2021  EXAMINATION: CT OF THE HEAD WITHOUT CONTRAST; CT OF THE CERVICAL SPINE WITHOUT CONTRAST 4/24/2021 10:59 am; 4/24/2021 10:58 am TECHNIQUE: CT of the head was performed without the administration of intravenous contrast. Dose modulation, iterative reconstruction, and/or weight based adjustment of the mA/kV was utilized to reduce the radiation dose to as low as reasonably achievable.; CT of the cervical spine was performed without the administration of intravenous contrast. Multiplanar reformatted images are provided for review. Dose modulation, iterative reconstruction, and/or weight based adjustment of the mA/kV was utilized to reduce the radiation dose to as low as reasonably achievable. COMPARISON: 04/13/2021 HISTORY: ORDERING SYSTEM PROVIDED HISTORY: fall TECHNOLOGIST PROVIDED HISTORY: Reason for exam:->fall Has a \"code stroke\" or \"stroke alert\" been called? ->No Decision Support Exception->Emergency Medical Condition (MA) Reason for Exam: Fall (pt [facility] [place] [facility], [facility] [place] pt on floor. pt has hx of dementia no obvious deformity.) Type of Exam: Initial; ORDERING SYSTEM PROVIDED HISTORY: fall TECHNOLOGIST PROVIDED HISTORY: Reason for exam:->fall Decision Support Exception->Emergency Medical Condition (MA) Reason for Exam: Fall (pt coming [place] [facility], [facility] found pt on floor. pt has hx of dementia no obvious deformity.) Acuity: Acute Type of Exam: Initial FINDINGS: HEAD: BRAIN/VENTRICLES: There is no acute intracranial hemorrhage, mass effect or midline shift. No abnormal extra-axial fluid collection. Cortical atrophy and chronic white matter changes in the brain and associated ventricular enlargement are again demonstrated. ORBITS: The visualized portion of the orbits demonstrate no acute abnormality. SINUSES: The visualized paranasal sinuses and mastoid air cells demonstrate no acute abnormality. SOFT TISSUES/SKULL: No acute abnormality of the visualized skull or soft tissues. CERVICAL SPINE: BONES/ALIGNMENT: There is no evidence of an acute cervical spine fracture. Circumscribed areas of lucency in the C2 and C6 vertebral bodies are again demonstrated. There is normal alignment of the cervical spine. 4/24/2021  EXAMINATION: CT OF THE HEAD WITHOUT CONTRAST; CT OF THE CERVICAL SPINE WITHOUT CONTRAST 4/24/2021 10:59 am; 4/24/2021 10:58 am TECHNIQUE: CT of the head was performed without the administration of intravenous contrast. Dose modulation, iterative reconstruction, and/or weight based adjustment of the mA/kV was utilized to reduce the radiation dose to as low as reasonably achievable.; CT of the cervical spine was performed without the administration of intravenous contrast. Multiplanar reformatted images are provided for review. Dose modulation, iterative reconstruction, and/or weight based adjustment of the mA/kV was utilized to reduce the radiation dose to as low as reasonably achievable. COMPARISON: 04/13/2021 HISTORY: ORDERING SYSTEM PROVIDED HISTORY: fall TECHNOLOGIST PROVIDED HISTORY: Reason for exam:->fall Has a \"code stroke\" or \"stroke alert\" been called? ->No Decision Support Exception->Emergency Medical Condition (MA) Reason for Exam: Fall (pt coming [place] [facility], [facility] found pt on floor. pt has hx of dementia no obvious deformity.) Type of Exam: Initial; ORDERING SYSTEM PROVIDED HISTORY: fall TECHNOLOGIST PROVIDED HISTORY: Reason for exam:->fall Decision Support Exception->Emergency Medical Condition (MA) Reason for Exam: Fall (pt coming [place] [facility], [facility] found pt on floor. pt has hx of dementia no obvious deformity.) Acuity: Acute Type of Exam: Initial FINDINGS: HEAD: BRAIN/VENTRICLES: There is no acute intracranial hemorrhage, mass effect or midline shift. No abnormal extra-axial fluid collection. Cortical atrophy and chronic white matter changes in the brain and associated ventricular enlargement are again demonstrated. ORBITS: The visualized portion of the orbits demonstrate no acute abnormality. SINUSES: The visualized paranasal sinuses and mastoid air cells demonstrate no acute abnormality.  SOFT TISSUES/SKULL: No acute abnormality of the visualized skull or soft tissues. CERVICAL SPINE: BONES/ALIGNMENT: There is no evidence of an acute cervical spine fracture. Circumscribed areas of lucency in the C2 and C6 vertebral bodies are again demonstrated. There is normal alignment of the cervical spine. DEGENERATIVE CHANGES: No significant degenerative changes. SOFT TISSUES: There is no prevertebral soft tissue swelling. Patulous esophagus. Chronic findings in the brain without acute CT abnormality identified. No acute osseous abnormality identified in the cervical spine. Ct Cervical Spine Wo Contrast    Result Date: 4/13/2021  EXAMINATION: CT OF THE CERVICAL SPINE WITHOUT CONTRAST 4/13/2021 12:49 pm TECHNIQUE: CT of the cervical spine was performed without the administration of intravenous contrast. Multiplanar reformatted images are provided for review. Dose modulation, iterative reconstruction, and/or weight based adjustment of the mA/kV was utilized to reduce the radiation dose to as low as reasonably achievable. COMPARISON: None. HISTORY: ORDERING SYSTEM PROVIDED HISTORY: fall TECHNOLOGIST PROVIDED HISTORY: Reason for exam:->fall Decision Support Exception->Emergency Medical Condition (MA) Reason for Exam: fall Acuity: Acute Type of Exam: Initial FINDINGS: BONES/ALIGNMENT: There is no acute fracture or traumatic malalignment. DEGENERATIVE CHANGES: No significant degenerative changes considering patient's age. There is narrowing and mild spurring C6-7. SOFT TISSUES: There is no prevertebral soft tissue swelling. No acute abnormality of the cervical spine. Ct Pelvis Wo Contrast Additional Contrast? None    Result Date: 4/14/2021  EXAMINATION: CT OF THE PELVIS WITHOUT CONTRAST 4/13/2021 3:58 pm TECHNIQUE: CT of the pelvis was performed without the administration of intravenous contrast.  Multiplanar reformatted images are provided for review.  Dose modulation, iterative reconstruction, and/or weight based adjustment of the mA/kV was utilized to HISTORY: SOB TECHNOLOGIST PROVIDED HISTORY: Reason for exam:->SOB Reason for Exam: Fall (pt family member states pt with multiple recent falls- states lives by herself, but daughter lives close by and helps her- daughter states she is unable to take care of her full time would possibly like nursing home placement) Acuity: Acute Type of Exam: Initial FINDINGS: The cardiomediastinal silhouette is borderline in size. The lungs are clear. No infiltrate, pleural fluid or evidence of overt failure. Osseous structures are intact     No acute cardiopulmonary disease. Xr Pelvis (min 3 Views)    Result Date: 4/24/2021  EXAMINATION: ONE XRAY VIEW OF THE PELVIS 4/24/2021 12:50 pm COMPARISON: None. HISTORY: ORDERING SYSTEM PROVIDED HISTORY: fall TECHNOLOGIST PROVIDED HISTORY: Reason for exam:->fall Reason for Exam: Fall ([facility] pt on floor. pt has hx of dementia no obvious deformity.) Tech held pt unwilling to lay flat Acuity: Unknown Type of Exam: Unknown FINDINGS: There is no evidence of acute fracture. There is normal alignment. No acute joint abnormality. No focal osseous lesion. No focal soft tissue abnormality. No acute osseous abnormality.      Vl Dup Carotid Bilateral    Result Date: 4/26/2021  Vascular Carotid Procedure -- PRELIMINARY SONOGRAPHER REPORT --   Demographics   Patient Name       Alphonso Sullivan   Date of Study      04/26/2021        Gender              Female   Patient Number     [id number]        Date of Birth       01/16/1931   Visit Number       [id number]         Age                 80 year(s)   Accession Number   [id number]        Room Number         0214   Corporate ID       [id number]            Sonographer         Juarez Dean,                                                           [address]   Ordering Physician Domo Lozano MD                                       Physician  Procedure Type of Study: Cerebral:Carotid, VL CAROTID DUPLEX BILATERAL. Tech Comments Right The right internal carotid artery demonstrates no significant stenosis. The right vertebral artery demonstrates normal antegrade flow. The right subclavian artery is visualized and demonstrates multiphasic flow. Left The left internal carotid artery demonstrates no significant stenosis. The left vertebral artery demonstrates normal antegrade flow. The left subclavian artery is visualized and demonstrates multiphasic flow. The left brachial pressure was not obtained due to IV placement . Velocities are measured in cm/s ; Diameters are measured in mm Carotid Right Measurements +---------------+----+----+-----+----+ ! Location       ! PSV ! EDV ! Angle! RI  ! +---------------+----+----+-----+----+ ! Prox CCA       !64.5!12. 7!60   !0.8 ! +---------------+----+----+-----+----+ ! Mid CCA        !58. 8!15. 4!60   !0.74! +---------------+----+----+-----+----+ ! Dist CCA       !68.5!20. 2! 60   !0.71! +---------------+----+----+-----+----+ ! Prox ICA       !28. 4!7.26!58   !0.74! +---------------+----+----+-----+----+ ! Mid ICA        ! 56  !24. 5!60   !0.56! +---------------+----+----+-----+----+ ! Dist ICA       !64. 7!25. 1! 60   !0.61! +---------------+----+----+-----+----+ ! Prox ECA       !38.6!    !60   !    ! +---------------+----+----+-----+----+ ! Vertebral      !46.7!    !60   !    ! +---------------+----+----+-----+----+ ! Prox Subclavian!45.9! ! 52   !    ! +---------------+----+----+-----+----+   - There is antegrade vertebral flow noted on the right side. - Additional Measurements:ICAPSV/CCAPSV 1. 1. ICAEDV/CCAEDV 1.98. Carotid Left Measurements +---------------+----+----+-----+----+ ! Location       ! PSV ! EDV ! Angle! RI  ! +---------------+----+----+-----+----+ ! Prox CCA       !48. 4!3.02!93   !0.79! +---------------+----+----+-----+----+ ! Mid CCA        !57. 2!10. 2! 60   !0.82! +---------------+----+----+-----+----+ ! Dist CCA       ! 55  !13  !60   !0.76! PLUS [id number]) MISC  TEST ONCE DAILY AS DIRECTED             lisinopril (PRINIVIL;ZESTRIL) 10 MG tablet  TAKE 1 TABLET BY MOUTH TWICE DAILY             metFORMIN (GLUCOPHAGE) 500 MG tablet  TAKE 1 TABLET BY MOUTH TWICE DAILY WITH MEALS             metoprolol succinate (TOPROL XL) 25 MG extended release tablet  TAKE ONE TABLET BY MOUTH DAILY             pravastatin (PRAVACHOL) 40 MG tablet  TAKE ONE TABLET BY MOUTH DAILY             triamcinolone (KENALOG) 0.1 % cream  Apply topically 2 times daily                  Allergies:  No Known Allergies    Follow up Instructions: Follow-up with PCP: Leonor Portillo MD in 2 wk  .       Total time spent on day of discharge including face-to-face visit, examination, documentation, counseling, preparation of discharge plans and followup, and discharge medicine reconciliation and presciptions is 33 minutes    Signed:  Linnette Redd MD  4/27/2021

## 2021-04-27 NOTE — PROGRESS NOTES
Occupational Therapy  Facility/Department: [facility]A NURSING  Daily Treatment Note  NAME: Ed Mata  : 1931  MRN: [id number]    Date of Service: 2021    Discharge Recommendations:    Ed Mata scored a 10/24 on the AM-PAC ADL Inpatient form. Current research shows that an AM-PAC score of 17 or less is typically not associated with a discharge to the patient's home setting. Based on the patient's AM-PAC score and their current ADL deficits, it is recommended that the patient have 3-5 sessions per week of Occupational Therapy at d/c to increase the patient's independence. Please see assessment section for further patient specific details. If patient discharges prior to next session this note will serve as a discharge summary. Please see below for the latest assessment towards goals. OT Equipment Recommendations  Equipment Needed: No  Other: refer to next level of care    Assessment   Performance deficits / Impairments: Decreased functional mobility ; Decreased safe awareness;Decreased balance;Decreased coordination;Decreased ADL status; Decreased cognition;Decreased posture;Decreased endurance;Decreased strength  Assessment: Pt was dependent for bed mobility and transfers. Pt presented with a posterior lean and required verbal and tactile cueing to correct posture. Pt was unable to fully stand therefore MaxA of 2 w/ kassandra STEDY was utilized to transfer pt to chair. Pt presents with the above deficits and is functioning below baseline. pt would benefit from continued skilled OT services to promote functional independence in order to return to PLOF.   Treatment Diagnosis: Above deficits associated with fall  Prognosis: Fair  Decision Making: Medium Complexity  History: Parkinson's, CAD, HTN, HLD, a-fib, anemia, hypothyroidism, MI  Exam: As stated above  OT Education: OT Role;Plan of Care;Transfer Training;Energy Conservation;Equipment  Patient Education: pt would benefit from repetition for carryover  Barriers to Learning: Cognition  REQUIRES OT FOLLOW UP: Yes  Activity Tolerance  Activity Tolerance: Treatment limited secondary to decreased cognition;Patient limited by fatigue  Activity Tolerance: BP taken prior to treatment 128/71, post transfer was 110/64  Safety Devices  Safety Devices in place: Yes  Type of devices: All fall risk precautions in place;Gait belt;Patient at risk for falls;Call light within reach;Nurse notified; Left in chair;Chair alarm in place         Patient Diagnosis(es): The primary encounter diagnosis was Fall from bed, initial encounter. A diagnosis of Elevated troponin was also pertinent to this visit. has a past medical history of Anxiety, Diabetic neuropathy ([address] Dysphagia, Gait disorder, Hyperlipidemia, Hypertension, Meningioma ([address] Poor memory, TIA (transient ischemic attack), and Type II or unspecified type diabetes mellitus without mention of complication, not stated as uncontrolled. has a past surgical history that includes Cholecystectomy; hysteroscopy; lipoma resection; and Colonoscopy. Restrictions  Restrictions/Precautions  Restrictions/Precautions: Fall Risk(high fall risk)  Required Braces or Orthoses?: No  Position Activity Restriction  Other position/activity restrictions: The patient is a 30-year-old elderly, blackAmerican lady with an unwitnessed syncope and a fall, was brought in forfurther evaluation. She arrived from [facility] via EMS. She doeshave a history of dementia. No obvious deformity. She is a poorhistorian and overall denied any chest pain. She was found on the floorin the extended care facility. It is unclear what happened but we weretold this morning by the F that the patient was severely demented andwas found on the floor with an unwitnessed syncope. The patient deniedany chest pain, denied any shortness of breath.   Subjective   General  Chart Reviewed: Yes  Patient assessed for rehabilitation services?: Yes  Response to previous treatment: Patient with no complaints from previous session  Family / Caregiver Present: No  Subjective  Subjective: Patient in semi fowlers position in bed finishing breakfast upon arrival, agreeable to OT/PT co-treatment  Patient Currently in Pain: Denies   Orientation  Orientation  Overall Orientation Status: Impaired  Orientation Level: Oriented to person;Oriented to situation;Disoriented to time;Disoriented to place  Objective    ADL  Grooming: Stand by assistance(face washing seated in chair)  Additional Comments: no further ADL's were addressed        Balance  Sitting Balance: Stand by assistance  Standing Balance: Dependent/Total(MaxA of 2)  Standing Balance  Time: partial stance ~5seconds  Activity: functional transfer to [address] [place] [address], EOB > recliner chair  Bed mobility  Supine to Sit: 2 Person assistance;Dependent/Total(MaxA of 2)  Scootin Person assistance;Dependent/Total(MaxA of 2)  Comment: HOB elevated during supine>sit, bed flat for scooting  Transfers  Sit to stand: 2 Person assistance;Dependent/Total(MaxA of 2)  Stand to sit: Dependent/Total;2 Person assistance(MaxA of 2)        Cognition  Overall Cognitive Status: WFL  Arousal/Alertness: Delayed responses to stimuli  Following Commands: Inconsistently follows commands; Follows one step commands with increased time; Follows one step commands with repetition  Attention Span: Attends with cues to redirect; Difficulty attending to directions; Difficulty dividing attention  Safety Judgement: Decreased awareness of need for assistance;Decreased awareness of need for safety  Problem Solving: Decreased awareness of errors  Insights: Decreased awareness of deficits  Initiation: Requires cues for all  Sequencing: Requires cues for all  Cognition Comment: Patient easily distracted and required minimal verbal cueing needed for redirection.  Patient with minimal verbal and physical cueing needed for sequencing and initiation of functional activties

## 2021-05-03 ENCOUNTER — APPOINTMENT (OUTPATIENT)
Dept: GENERAL RADIOLOGY | Age: 86
DRG: 315 | End: 2021-05-03
Payer: MEDICARE

## 2021-05-03 ENCOUNTER — APPOINTMENT (OUTPATIENT)
Dept: CT IMAGING | Age: 86
DRG: 315 | End: 2021-05-03
Payer: MEDICARE

## 2021-05-03 ENCOUNTER — HOSPITAL ENCOUNTER (INPATIENT)
Age: 86
LOS: 6 days | Discharge: OTHER INSTITUTION | DRG: 315 | End: 2021-05-09
Attending: STUDENT IN AN ORGANIZED HEALTH CARE EDUCATION/TRAINING PROGRAM | Admitting: HOSPITALIST
Payer: MEDICARE

## 2021-05-03 DIAGNOSIS — K86.9 LESION OF PANCREAS: ICD-10-CM

## 2021-05-03 DIAGNOSIS — L03.115 CELLULITIS OF RIGHT HIP: ICD-10-CM

## 2021-05-03 DIAGNOSIS — R55 SYNCOPE AND COLLAPSE: ICD-10-CM

## 2021-05-03 DIAGNOSIS — R65.21 SEPTIC SHOCK (HCC): Primary | ICD-10-CM

## 2021-05-03 DIAGNOSIS — K57.92 DIVERTICULITIS: ICD-10-CM

## 2021-05-03 DIAGNOSIS — R77.8 ELEVATED TROPONIN: ICD-10-CM

## 2021-05-03 DIAGNOSIS — A41.9 SEPTIC SHOCK (HCC): Primary | ICD-10-CM

## 2021-05-03 LAB
A/G RATIO: 0.8 (ref 1.1–2.2)
ALBUMIN SERPL-MCNC: 2.7 G/DL (ref 3.4–5)
ALP BLD-CCNC: 64 U/L (ref 40–129)
ALT SERPL-CCNC: 11 U/L (ref 10–40)
ANION GAP SERPL CALCULATED.3IONS-SCNC: 9 MMOL/L (ref 3–16)
AST SERPL-CCNC: 20 U/L (ref 15–37)
BACTERIA: ABNORMAL /HPF
BANDED NEUTROPHILS RELATIVE PERCENT: 4 % (ref 0–7)
BASE EXCESS VENOUS: 2.8 MMOL/L (ref -3–3)
BASOPHILS ABSOLUTE: 0 K/UL (ref 0–0.2)
BASOPHILS RELATIVE PERCENT: 0 %
BILIRUB SERPL-MCNC: 0.3 MG/DL (ref 0–1)
BILIRUBIN URINE: NEGATIVE
BLOOD, URINE: NEGATIVE
BUN BLDV-MCNC: 34 MG/DL (ref 7–20)
CALCIUM SERPL-MCNC: 9.8 MG/DL (ref 8.3–10.6)
CARBOXYHEMOGLOBIN: 5.3 % (ref 0–1.5)
CHLORIDE BLD-SCNC: 101 MMOL/L (ref 99–110)
CLARITY: ABNORMAL
CO2: 28 MMOL/L (ref 21–32)
COLOR: YELLOW
CREAT SERPL-MCNC: 1.2 MG/DL (ref 0.6–1.2)
EKG ATRIAL RATE: 156 BPM
EKG DIAGNOSIS: NORMAL
EKG P AXIS: 30 DEGREES
EKG P-R INTERVAL: 124 MS
EKG Q-T INTERVAL: 342 MS
EKG QRS DURATION: 74 MS
EKG QTC CALCULATION (BAZETT): 503 MS
EKG R AXIS: 34 DEGREES
EKG T AXIS: 43 DEGREES
EKG VENTRICULAR RATE: 130 BPM
EOSINOPHILS ABSOLUTE: 0 K/UL (ref 0–0.6)
EOSINOPHILS RELATIVE PERCENT: 0 %
EPITHELIAL CELLS, UA: 1 /HPF (ref 0–5)
GFR AFRICAN AMERICAN: 51
GFR NON-AFRICAN AMERICAN: 42
GLOBULIN: 3.6 G/DL
GLUCOSE BLD-MCNC: 193 MG/DL (ref 70–99)
GLUCOSE URINE: NEGATIVE MG/DL
HCO3 VENOUS: 27.6 MMOL/L (ref 23–29)
HCT VFR BLD CALC: 24.6 % (ref 36–48)
HEMOGLOBIN: 7.7 G/DL (ref 12–16)
HYALINE CASTS: 2 /LPF (ref 0–8)
INR BLD: 1.18 (ref 0.86–1.14)
KETONES, URINE: ABNORMAL MG/DL
LACTIC ACID, SEPSIS: 3.8 MMOL/L (ref 0.4–1.9)
LACTIC ACID, SEPSIS: 3.9 MMOL/L (ref 0.4–1.9)
LEUKOCYTE ESTERASE, URINE: ABNORMAL
LIPASE: 28 U/L (ref 13–60)
LYMPHOCYTES ABSOLUTE: 1.8 K/UL (ref 1–5.1)
LYMPHOCYTES RELATIVE PERCENT: 13 %
MCH RBC QN AUTO: 30.6 PG (ref 26–34)
MCHC RBC AUTO-ENTMCNC: 31.1 G/DL (ref 31–36)
MCV RBC AUTO: 98.4 FL (ref 80–100)
METHEMOGLOBIN VENOUS: 0 %
MICROSCOPIC EXAMINATION: YES
MONOCYTES ABSOLUTE: 0.6 K/UL (ref 0–1.3)
MONOCYTES RELATIVE PERCENT: 4 %
NEUTROPHILS ABSOLUTE: 11.8 K/UL (ref 1.7–7.7)
NEUTROPHILS RELATIVE PERCENT: 79 %
NITRITE, URINE: NEGATIVE
O2 CONTENT, VEN: 12 VOL %
O2 SAT, VEN: 100 %
O2 THERAPY: ABNORMAL
PCO2, VEN: 42.9 MMHG (ref 40–50)
PDW BLD-RTO: 19.6 % (ref 12.4–15.4)
PH UA: 6 (ref 5–8)
PH VENOUS: 7.42 (ref 7.35–7.45)
PLATELET # BLD: 199 K/UL (ref 135–450)
PLATELET SLIDE REVIEW: ADEQUATE
PMV BLD AUTO: 8.2 FL (ref 5–10.5)
PO2, VEN: 164 MMHG (ref 25–40)
POTASSIUM REFLEX MAGNESIUM: 5.3 MMOL/L (ref 3.5–5.1)
PRO-BNP: 3008 PG/ML (ref 0–449)
PROTEIN UA: NEGATIVE MG/DL
PROTHROMBIN TIME: 13.7 SEC (ref 10–13.2)
RBC # BLD: 2.51 M/UL (ref 4–5.2)
RBC # BLD: NORMAL 10*6/UL
RBC UA: 4 /HPF (ref 0–4)
REASON FOR REJECTION: NORMAL
REJECTED TEST: NORMAL
SLIDE REVIEW: ABNORMAL
SODIUM BLD-SCNC: 138 MMOL/L (ref 136–145)
SPECIFIC GRAVITY UA: 1.02 (ref 1–1.03)
TCO2 CALC VENOUS: 65 MMOL/L
TOTAL PROTEIN: 6.3 G/DL (ref 6.4–8.2)
TROPONIN: 0.17 NG/ML
URINE REFLEX TO CULTURE: ABNORMAL
URINE TYPE: ABNORMAL
UROBILINOGEN, URINE: 1 E.U./DL
WBC # BLD: 14.2 K/UL (ref 4–11)
WBC UA: 6 /HPF (ref 0–5)

## 2021-05-03 PROCEDURE — 83880 ASSAY OF NATRIURETIC PEPTIDE: CPT

## 2021-05-03 PROCEDURE — 70450 CT HEAD/BRAIN W/O DYE: CPT

## 2021-05-03 PROCEDURE — U0005 INFEC AGEN DETEC AMPLI PROBE: HCPCS

## 2021-05-03 PROCEDURE — 36415 COLL VENOUS BLD VENIPUNCTURE: CPT

## 2021-05-03 PROCEDURE — 96365 THER/PROPH/DIAG IV INF INIT: CPT

## 2021-05-03 PROCEDURE — 2000000000 HC ICU R&B

## 2021-05-03 PROCEDURE — 6360000002 HC RX W HCPCS: Performed by: PHYSICIAN ASSISTANT

## 2021-05-03 PROCEDURE — 80053 COMPREHEN METABOLIC PANEL: CPT

## 2021-05-03 PROCEDURE — 93005 ELECTROCARDIOGRAM TRACING: CPT | Performed by: STUDENT IN AN ORGANIZED HEALTH CARE EDUCATION/TRAINING PROGRAM

## 2021-05-03 PROCEDURE — 1200000000 HC SEMI PRIVATE

## 2021-05-03 PROCEDURE — 71260 CT THORAX DX C+: CPT

## 2021-05-03 PROCEDURE — 87150 DNA/RNA AMPLIFIED PROBE: CPT

## 2021-05-03 PROCEDURE — 85610 PROTHROMBIN TIME: CPT

## 2021-05-03 PROCEDURE — 71045 X-RAY EXAM CHEST 1 VIEW: CPT

## 2021-05-03 PROCEDURE — 82803 BLOOD GASES ANY COMBINATION: CPT

## 2021-05-03 PROCEDURE — 83605 ASSAY OF LACTIC ACID: CPT

## 2021-05-03 PROCEDURE — 84484 ASSAY OF TROPONIN QUANT: CPT

## 2021-05-03 PROCEDURE — 6370000000 HC RX 637 (ALT 250 FOR IP): Performed by: PHYSICIAN ASSISTANT

## 2021-05-03 PROCEDURE — 81001 URINALYSIS AUTO W/SCOPE: CPT

## 2021-05-03 PROCEDURE — 2500000003 HC RX 250 WO HCPCS: Performed by: PHYSICIAN ASSISTANT

## 2021-05-03 PROCEDURE — 93010 ELECTROCARDIOGRAM REPORT: CPT | Performed by: INTERNAL MEDICINE

## 2021-05-03 PROCEDURE — 87040 BLOOD CULTURE FOR BACTERIA: CPT

## 2021-05-03 PROCEDURE — 72125 CT NECK SPINE W/O DYE: CPT

## 2021-05-03 PROCEDURE — 6360000004 HC RX CONTRAST MEDICATION: Performed by: PHYSICIAN ASSISTANT

## 2021-05-03 PROCEDURE — 2580000003 HC RX 258: Performed by: PHYSICIAN ASSISTANT

## 2021-05-03 PROCEDURE — 85025 COMPLETE CBC W/AUTO DIFF WBC: CPT

## 2021-05-03 PROCEDURE — 99283 EMERGENCY DEPT VISIT LOW MDM: CPT

## 2021-05-03 PROCEDURE — 36556 INSERT NON-TUNNEL CV CATH: CPT

## 2021-05-03 PROCEDURE — 73700 CT LOWER EXTREMITY W/O DYE: CPT

## 2021-05-03 PROCEDURE — U0003 INFECTIOUS AGENT DETECTION BY NUCLEIC ACID (DNA OR RNA); SEVERE ACUTE RESPIRATORY SYNDROME CORONAVIRUS 2 (SARS-COV-2) (CORONAVIRUS DISEASE [COVID-19]), AMPLIFIED PROBE TECHNIQUE, MAKING USE OF HIGH THROUGHPUT TECHNOLOGIES AS DESCRIBED BY CMS-2020-01-R: HCPCS

## 2021-05-03 PROCEDURE — 96361 HYDRATE IV INFUSION ADD-ON: CPT

## 2021-05-03 PROCEDURE — 36591 DRAW BLOOD OFF VENOUS DEVICE: CPT

## 2021-05-03 PROCEDURE — 83690 ASSAY OF LIPASE: CPT

## 2021-05-03 PROCEDURE — 36592 COLLECT BLOOD FROM PICC: CPT

## 2021-05-03 RX ORDER — PROMETHAZINE HYDROCHLORIDE 25 MG/1
12.5 TABLET ORAL EVERY 6 HOURS PRN
Status: DISCONTINUED | OUTPATIENT
Start: 2021-05-03 | End: 2021-05-09 | Stop reason: HOSPADM

## 2021-05-03 RX ORDER — SODIUM CHLORIDE 9 MG/ML
25 INJECTION, SOLUTION INTRAVENOUS PRN
Status: DISCONTINUED | OUTPATIENT
Start: 2021-05-03 | End: 2021-05-03 | Stop reason: SDUPTHER

## 2021-05-03 RX ORDER — SODIUM CHLORIDE 0.9 % (FLUSH) 0.9 %
5-40 SYRINGE (ML) INJECTION PRN
Status: DISCONTINUED | OUTPATIENT
Start: 2021-05-03 | End: 2021-05-03 | Stop reason: SDUPTHER

## 2021-05-03 RX ORDER — ACETAMINOPHEN 325 MG/1
650 TABLET ORAL EVERY 6 HOURS PRN
Status: DISCONTINUED | OUTPATIENT
Start: 2021-05-03 | End: 2021-05-09 | Stop reason: HOSPADM

## 2021-05-03 RX ORDER — SODIUM CHLORIDE 0.9 % (FLUSH) 0.9 %
5-40 SYRINGE (ML) INJECTION EVERY 12 HOURS SCHEDULED
Status: DISCONTINUED | OUTPATIENT
Start: 2021-05-03 | End: 2021-05-03 | Stop reason: SDUPTHER

## 2021-05-03 RX ORDER — SODIUM CHLORIDE 0.9 % (FLUSH) 0.9 %
5-40 SYRINGE (ML) INJECTION EVERY 12 HOURS SCHEDULED
Status: DISCONTINUED | OUTPATIENT
Start: 2021-05-04 | End: 2021-05-09 | Stop reason: HOSPADM

## 2021-05-03 RX ORDER — SODIUM CHLORIDE 0.9 % (FLUSH) 0.9 %
5-40 SYRINGE (ML) INJECTION PRN
Status: DISCONTINUED | OUTPATIENT
Start: 2021-05-03 | End: 2021-05-09 | Stop reason: HOSPADM

## 2021-05-03 RX ORDER — CLOPIDOGREL BISULFATE 75 MG/1
75 TABLET ORAL DAILY
Status: DISCONTINUED | OUTPATIENT
Start: 2021-05-04 | End: 2021-05-09 | Stop reason: HOSPADM

## 2021-05-03 RX ORDER — DONEPEZIL HYDROCHLORIDE 5 MG/1
10 TABLET, FILM COATED ORAL NIGHTLY
Status: DISCONTINUED | OUTPATIENT
Start: 2021-05-04 | End: 2021-05-09 | Stop reason: HOSPADM

## 2021-05-03 RX ORDER — NICOTINE POLACRILEX 4 MG
15 LOZENGE BUCCAL PRN
Status: DISCONTINUED | OUTPATIENT
Start: 2021-05-03 | End: 2021-05-09 | Stop reason: HOSPADM

## 2021-05-03 RX ORDER — SODIUM CHLORIDE 9 MG/ML
25 INJECTION, SOLUTION INTRAVENOUS PRN
Status: DISCONTINUED | OUTPATIENT
Start: 2021-05-03 | End: 2021-05-09 | Stop reason: HOSPADM

## 2021-05-03 RX ORDER — ACETAMINOPHEN 650 MG/1
650 SUPPOSITORY RECTAL EVERY 6 HOURS PRN
Status: DISCONTINUED | OUTPATIENT
Start: 2021-05-03 | End: 2021-05-09 | Stop reason: HOSPADM

## 2021-05-03 RX ORDER — ASPIRIN 300 MG/1
300 SUPPOSITORY RECTAL ONCE
Status: COMPLETED | OUTPATIENT
Start: 2021-05-03 | End: 2021-05-03

## 2021-05-03 RX ORDER — DEXTROSE MONOHYDRATE 50 MG/ML
100 INJECTION, SOLUTION INTRAVENOUS PRN
Status: DISCONTINUED | OUTPATIENT
Start: 2021-05-03 | End: 2021-05-09 | Stop reason: HOSPADM

## 2021-05-03 RX ORDER — INSULIN LISPRO 100 [IU]/ML
0-3 INJECTION, SOLUTION INTRAVENOUS; SUBCUTANEOUS NIGHTLY
Status: DISCONTINUED | OUTPATIENT
Start: 2021-05-04 | End: 2021-05-09 | Stop reason: HOSPADM

## 2021-05-03 RX ORDER — SODIUM CHLORIDE 9 MG/ML
INJECTION, SOLUTION INTRAVENOUS CONTINUOUS
Status: DISCONTINUED | OUTPATIENT
Start: 2021-05-04 | End: 2021-05-06

## 2021-05-03 RX ORDER — INSULIN LISPRO 100 [IU]/ML
0-6 INJECTION, SOLUTION INTRAVENOUS; SUBCUTANEOUS
Status: DISCONTINUED | OUTPATIENT
Start: 2021-05-04 | End: 2021-05-09 | Stop reason: HOSPADM

## 2021-05-03 RX ORDER — GABAPENTIN 100 MG/1
100 CAPSULE ORAL 2 TIMES DAILY
Status: DISCONTINUED | OUTPATIENT
Start: 2021-05-04 | End: 2021-05-09 | Stop reason: HOSPADM

## 2021-05-03 RX ORDER — POLYETHYLENE GLYCOL 3350 17 G/17G
17 POWDER, FOR SOLUTION ORAL DAILY PRN
Status: DISCONTINUED | OUTPATIENT
Start: 2021-05-03 | End: 2021-05-09 | Stop reason: HOSPADM

## 2021-05-03 RX ORDER — METOPROLOL SUCCINATE 25 MG/1
25 TABLET, EXTENDED RELEASE ORAL DAILY
Status: DISCONTINUED | OUTPATIENT
Start: 2021-05-04 | End: 2021-05-09 | Stop reason: HOSPADM

## 2021-05-03 RX ORDER — DEXTROSE MONOHYDRATE 25 G/50ML
12.5 INJECTION, SOLUTION INTRAVENOUS PRN
Status: DISCONTINUED | OUTPATIENT
Start: 2021-05-03 | End: 2021-05-09 | Stop reason: HOSPADM

## 2021-05-03 RX ORDER — 0.9 % SODIUM CHLORIDE 0.9 %
30 INTRAVENOUS SOLUTION INTRAVENOUS ONCE
Status: COMPLETED | OUTPATIENT
Start: 2021-05-03 | End: 2021-05-03

## 2021-05-03 RX ORDER — ONDANSETRON 2 MG/ML
4 INJECTION INTRAMUSCULAR; INTRAVENOUS EVERY 6 HOURS PRN
Status: DISCONTINUED | OUTPATIENT
Start: 2021-05-03 | End: 2021-05-09 | Stop reason: HOSPADM

## 2021-05-03 RX ORDER — MIRTAZAPINE 7.5 MG/1
7.5 TABLET, FILM COATED ORAL NIGHTLY
COMMUNITY

## 2021-05-03 RX ORDER — ASPIRIN 81 MG/1
324 TABLET, CHEWABLE ORAL ONCE
Status: DISCONTINUED | OUTPATIENT
Start: 2021-05-03 | End: 2021-05-03 | Stop reason: ALTCHOICE

## 2021-05-03 RX ORDER — ASPIRIN 81 MG/1
81 TABLET ORAL DAILY
Status: DISCONTINUED | OUTPATIENT
Start: 2021-05-04 | End: 2021-05-07 | Stop reason: ALTCHOICE

## 2021-05-03 RX ADMIN — ASPIRIN 300 MG: 300 SUPPOSITORY RECTAL at 18:05

## 2021-05-03 RX ADMIN — METRONIDAZOLE 500 MG: 500 INJECTION, SOLUTION INTRAVENOUS at 18:10

## 2021-05-03 RX ADMIN — SODIUM CHLORIDE 632 ML: 9 INJECTION, SOLUTION INTRAVENOUS at 13:51

## 2021-05-03 RX ADMIN — IOPAMIDOL 75 ML: 755 INJECTION, SOLUTION INTRAVENOUS at 14:59

## 2021-05-03 RX ADMIN — Medication 0.04 MCG/KG/MIN: at 17:59

## 2021-05-03 RX ADMIN — CEFEPIME HYDROCHLORIDE 2000 MG: 2 INJECTION, POWDER, FOR SOLUTION INTRAVENOUS at 15:58

## 2021-05-03 RX ADMIN — VANCOMYCIN HYDROCHLORIDE 750 MG: 750 INJECTION, POWDER, LYOPHILIZED, FOR SOLUTION INTRAVENOUS at 16:36

## 2021-05-03 ASSESSMENT — ENCOUNTER SYMPTOMS
DIARRHEA: 0
CHEST TIGHTNESS: 0
VOMITING: 0
ABDOMINAL PAIN: 0
SHORTNESS OF BREATH: 0
NAUSEA: 0

## 2021-05-03 ASSESSMENT — PAIN SCALES - GENERAL: PAINLEVEL_OUTOF10: 0

## 2021-05-03 NOTE — ED NOTES
Central line placement verified by xray, levophed infusing as charted.  PT remains asleep in bed     Boy Antoine RN  05/03/21 [id number]
Staff from [facility] called to check on patient     Bryan Lacey RN  05/03/21 [id number]
Update given to daughter.      Sherren Currier, RN  05/03/21 1932
TEST ONCE DAILY AS DIRECTED 100 each 10

## 2021-05-03 NOTE — ED PROVIDER NOTES
[facility]        Pt Name: Constance Faulkner  MRN: [id number]  [place] 1/16/1931  Date of evaluation: 5/3/2021  Provider: Ele Christian PA-C  PCP: Mark Neal MD    MYLA. I have evaluated this patient. My supervising physician was available for consultation. CHIEF COMPLAINT       Chief Complaint   Patient presents with    Hip Pain     from [facility] by first care. pt c/o of hip pain squad says pressure is low and ekg looks like a fib.  no nurse spoke with juwan from [address] [place] [address]   (Location, Timing/Onset, Context/Setting, Quality, Duration, Modifying Factors, Severity, Associated Signs and Symptoms)  Note limiting factors. Constance Faulkner is a 80 y.o. female presents the emergency department via [employer] her private ambulance from [facility]. It is reported that the patient was being sent to the emergency department on the transfer form because of difficulties as a pertains to syncope and collapse, hypotension, and febrile illness that have been treated with Tylenol prior to arrival.  Unfortunately it does not appear that nursing report was called. Attempts to reach them are currently in process at this time of patient presentation. Patient is alert to person but not to time or place. She tells me that she is having pain and discomfort over her right hip. She tells me that she is not exactly sure why she is here. When asked if she is having pain and discomfort in any other area the patient denies specifically that she is having difficulties as a pertains to chest pain palpitations or shortness of breath. No GI or  complaints. Of note I have been able to review with the patient recent history documented in our computer it appears she is had difficulties with recurrent falls and did have a recent hospitalization and has known multiple wounds.       Nursing Notes were all reviewed and agreed with or any disagreements were addressed in the HPI. REVIEW OF SYSTEMS    (2-9 systems for level 4, 10 or more for level 5)     Review of Systems   Constitutional: Negative for activity change, chills and fever. Respiratory: Negative for chest tightness and shortness of breath. Cardiovascular: Negative for chest pain. Gastrointestinal: Negative for abdominal pain, diarrhea, nausea and vomiting. Genitourinary: Negative for dysuria and flank pain. Positives and Pertinent negatives as per HPI. Except as noted above in the ROS, all other systems were reviewed and negative. PAST MEDICAL HISTORY     Past Medical History:   Diagnosis Date    Anxiety     Diabetic neuropathy ([facility] [address]     Dysphagia     Gait disorder     Hyperlipidemia     Hypertension     Meningioma ([facility] [address] [facility]     Poor memory     TIA (transient ischemic attack)     Type II or unspecified type diabetes mellitus without mention of complication, not stated as uncontrolled          SURGICAL HISTORY     Past Surgical History:   Procedure Laterality Date    CHOLECYSTECTOMY      COLONOSCOPY      HYSTEROSCOPY      LIPOMA RESECTION           CURRENTMEDICATIONS       Previous Medications    ASPIRIN 81 MG EC TABLET    Take 81 mg by mouth daily.       CLOPIDOGREL (PLAVIX) 75 MG TABLET    TAKE ONE TABLET BY MOUTH DAILY    DONEPEZIL (ARICEPT) 10 MG TABLET    TAKE 1 TABLET BY MOUTH EVERY NIGHT AT BEDTIME    GABAPENTIN (NEURONTIN) 100 MG CAPSULE    TAKE ONE CAPSULE BY MOUTH THREE TIMES A DAY    HYDROCHLOROTHIAZIDE (MICROZIDE) 12.5 MG CAPSULE    TAKE ONE CAPSULE BY MOUTH EVERY MORNING    LISINOPRIL (PRINIVIL;ZESTRIL) 10 MG TABLET    TAKE 1 TABLET BY MOUTH TWICE DAILY    METFORMIN (GLUCOPHAGE) 500 MG TABLET    TAKE 1 TABLET BY MOUTH TWICE DAILY WITH MEALS    METOPROLOL SUCCINATE (TOPROL XL) 25 MG EXTENDED RELEASE TABLET    TAKE ONE TABLET BY MOUTH DAILY    MIRTAZAPINE (REMERON) 7.5 MG TABLET    Take 7.5 mg by mouth nightly Abdominal:      General: There is no distension. Palpations: Abdomen is soft. Abdomen is not rigid. There is no mass. Tenderness: There is no abdominal tenderness. There is no guarding or rebound. Skin:     General: Skin is warm and dry. Comments: Areas of pressure ulceration minor in nature noted over the sacral region. Skin excoriation noted in and around the perineum without significant abnormality. Neurological:      Mental Status: She is alert and oriented to person, place, and time. GCS: GCS eye subscore is 4. GCS verbal subscore is 5. GCS motor subscore is 6. Cranial Nerves: No cranial nerve deficit. Sensory: No sensory deficit.       Coordination: Coordination normal.   Psychiatric:         Behavior: Behavior normal.         DIAGNOSTIC RESULTS   LABS:    Labs Reviewed   COMPREHENSIVE METABOLIC PANEL W/ REFLEX TO MG FOR LOW K - Abnormal; Notable for the following components:       Result Value    Potassium reflex Magnesium 5.3 (*)     Glucose 193 (*)     BUN 34 (*)     GFR Non- 42 (*)     GFR  51 (*)     Total Protein 6.3 (*)     Albumin 2.7 (*)     Albumin/Globulin Ratio 0.8 (*)     All other components within normal limits    Narrative:     CALL  [facility] tel. [phone],  Rejected Test Name/Called to: Jace Hernandez, 05/03/2021 14:05, by [address] Josué Guillen  Performed at:  [facility] [address], [place], [address] [place]   Phone [phone]   TROPONIN - Abnormal; Notable for the following components:    Troponin 0.17 (*)     All other components within normal limits    Narrative:     CALL  [facility] telTalxiao Arredondo [phone],  Rejected Test Name/Called to: Jace Hernandez, 05/03/2021 14:05, by 451 Josué Guillen  Performed at:  [facility] [address], [place], [facility] [place]   Phone [phone]   BRAIN NATRIURETIC PEPTIDE - Abnormal; Notable for the following components:    Pro-BNP 3,008 (*) All other components within normal limits    Narrative:     CALL  [facility] tel. [phone],  Rejected Test Name/Called to: Luis Armando Hyman, 05/03/2021 14:05, by Elayne Guillen  Performed at:  [facility]  [address], [address] [email or web address]   Phone [phone]   URINE RT REFLEX TO CULTURE - Abnormal; Notable for the following components:    Clarity, UA CLOUDY (*)     Ketones, Urine TRACE (*)     Leukocyte Esterase, Urine SMALL (*)     All other components within normal limits    Narrative:     Performed at:  [facility]  [address],  [place], [address]   Phone [phone]   BLOOD GAS, VENOUS - Abnormal; Notable for the following components:    pO2, Gadiel 164.0 (*)     Carboxyhemoglobin 5.3 (*)     All other components within normal limits    Narrative:     Performed at:  [facility] [address], [address]   Phone [phone]   LACTATE, SEPSIS - Abnormal; Notable for the following components:    Lactic Acid, Sepsis 3.9 (*)     All other components within normal limits    Narrative:     [facility] tel. [phone],  Rejected Test Name/Called to: Luis Armando Hyman, 05/03/2021 14:05, by Elayne Guillen  Performed at:  [facility]  [address], [address]   Phone [phone]   LACTATE, SEPSIS - Abnormal; Notable for the following components:    Lactic Acid, Sepsis 3.8 (*)     All other components within normal limits    Narrative:     Performed at:  [facility] [address],  [place], [address]   Phone [phone]   CBC WITH AUTO DIFFERENTIAL - Abnormal; Notable for the following components:    WBC 14.2 (*)     RBC 2.51 (*)     Hemoglobin 7.7 (*)     Hematocrit 24.6 (*)     RDW 19.6 (*)     Neutrophils Absolute 11.8 (*)     All other components within normal limits    Narrative:     Performed at:  [facility]  [address] [facility], [address] [facility]   Phone [phone]   MICROSCOPIC URINALYSIS - Abnormal; Notable for the following components:    Bacteria, UA 4+ (*)     WBC, UA 6 (*)     All other components within normal limits    Narrative:     Performed at:  [facility]  [address] [facility], [address]   Phone [phone]   PROTIME-INR - Abnormal; Notable for the following components:    Protime 13.7 (*)     INR 1.18 (*)     All other components within normal limits    Narrative:     Performed at:  [facility] [address], [facility], [address] [facility]   Phone [phone]   CULTURE, BLOOD 1   CULTURE, BLOOD 2   LIPASE    Narrative:     Milagro Ackerman  [facility] [phone],  Rejected Test Name/Called to: Fabiola Collins, 05/03/2021 14:05, by Elayne Guillen  Performed at:  [facility] [address], [facility], [address]   Phone [phone]    Narrative:     [facility] [phone],  Rejected Test Name/Called to: Fabiola Collins, 05/03/2021 14:05, by Elayne Guillen  Performed at:  [facility] [address], [facility], [address]   Phone [phone]       All other labs were within normal range or not returned as of this dictation. EKG: All EKG's are interpreted by the Emergency Department Physician in the absence of a cardiologist.  Please see their note for interpretation of EKG. RADIOLOGY:   Non-plain film images such as CT, Ultrasound and MRI are read by the radiologist. Plain radiographic images are visualized and preliminarily interpreted by the ED Provider with the below findings:        Interpretation per the Radiologist below, if available at the time of this note:    CT CHEST [address]   Final Result   1. No acute posttraumatic abnormality in the chest, abdomen, or pelvis. Accession Number   [id number]        Room Number         0217   Corporate ID       [id number]            Sonographer         Keira Ruelas RVT, BS   Ordering Physician Kelsie Nelson MD,                                       Physician           [facility]  Procedure Type of Study   TTE procedure:ECHOCARDIOGRAM COMPLETE 2D W DOPPLER W COLOR. Procedure Date Date: 04/26/2021 Start: 02:48 PM Study Location: [facility] - Echo Lab Technical Quality: Good visualization Indications:Syncope. Patient Status: Routine Height: 64 inches Weight: 120 pounds BSA: 1.57 m2 BMI: 20.6 kg/m2 HR: 120 bpm BP: 112/70 mmHg  Conclusions   Summary  -Left ventricular cavity size is normal with normal left ventricular wall  thickness.  -Overall left ventricular systolic function appears mildly reduced.  -Ejection fraction is visually estimated to be 45-50%. -No regional wall motion abnormalities are noted. -Cannot determine diastology due to atrial fibrillation.  -Mitral valve leaflets appear moderately thickened. -Mild mitral annular calcification.  -Mild mitral regurgitation.  -The aortic valve is mildly thickened/calcified but opens well with normal  gradients.  -Trivial aortic regurgitation.  -Moderate-severe tricuspid regurgitation with a PASP of 62 mmHg. -Mild pulmonic regurgitation present.  -The right ventricle is enlarged with normal function. Signature   ------------------------------------------------------------------  Electronically signed by Adan Recio MD, [facility] (Interpreting  physician) on 04/27/2021 at 12:16 PM  ------------------------------------------------------------------   Findings   Left Ventricle  Left ventricular cavity size is normal with normal left ventricular wall  thickness. Overall left ventricular systolic function appears mildly reduced. Ejection fraction is visually estimated to be 45-50%. no administration in time range)   sodium chloride flush 0.9 % injection 5-40 mL (has no administration in time range)   0.9 % sodium chloride infusion (has no administration in time range)   vancomycin (VANCOCIN) 750 mg in dextrose 5 % 250 mL IVPB (750 mg Intravenous New Bag 5/3/21 1636)   metronidazole (FLAGYL) 500 mg in NaCl 100 mL IVPB premix (has no administration in time range)   norepinephrine (LEVOPHED) 16 mg in dextrose 5% 250 mL infusion (has no administration in time range)   aspirin suppository 300 mg (has no administration in time range)   0.9 % sodium chloride IV bolus 1,632 mL ( Intravenous Restarted 5/3/21 1524)   cefepime (MAXIPIME) 2000 mg IVPB minibag (0 mg Intravenous Stopped 5/3/21 1632)   iopamidol (ISOVUE-370) 76 % injection 75 mL (75 mLs Intravenous Given 5/3/21 1459)           The patient's detailed history of present illness is documented as above. Upon arrival to the emergency department the patient's vital signs are as documented. The patient is noted to be hemodynamically stable and afebrile. Physical examination findings are as above. IV access was obtained laboratory testing work-up was initiated. EKG performed upon arrival demonstrates a sinus tachycardia with a rate of 130. There is evidence of multiple premature atrial complexes noted. No evidence of acute ST elevation. Please see attending physician details for further EKG interpretation as well as comparison. Time of presentation patient did have mildly soft blood pressures. She had been documented to be febrile itself noted to be tachycardic. Concerns for sepsis from skin source of right hip wound. Antibiotics initiated as above in the balance of sepsis level fluid also initiated as above. Unfortunately she continued to have difficulties with mild hypotension. Initial CBC returns with leukocytosis of 14.2. She is mildly anemic and this is chronic in nature today at 7.7 and a hematocrit of 24.6.   No evidence of thrombocytopenia or thrombocytosis. Comprehensive metabolic panel demonstrates a BUN of 34 creatinine 1.2 nonfasting glucose is 193. Electrolytes as documented potassium with slight hemolysis at 5.3. Initial lactic acid did return elevated at 3.9. Troponin demonstrates evidence of endorgan damage is normally mildly elevated in the range of 0.05 but today is 0.17. Venous blood gas demonstrates normal pH without hypercapnia or hypoxia. UA has a small amount of leukocytes and white blood cells but this has already been treated with antibiotics as above. Because of the reports of syncope and collapse I did proceed with imaging. CT of the head demonstrates no evidence of acute intercranial normality. CT of the cervical spine demonstrates no evidence of acute fracture or dislocation. CT imaging of the right hip demonstrates no evidence of acute bony abnormality. CT abdomen and pelvis demonstrates no evidence of acute cardiopulmonary process. Incidental finding of wall thickening of the sigmoid colon consistent with acute diverticulitis. There is a 1.5 cm cystic lesion in the head of the pancreas which is new in finding. In light of the above mention patient continued to be mildly hypotensive. Pressure support was initiated as above. Patient will require ongoing care management on an inpatient basis in intensive care or level therefore the hospitalist was contacted instead of Dr. Tri Kitchen. Patient will be admitted to medical surgical services for ongoing care management the diagnoses below. FINAL IMPRESSION      1. Septic shock (HCC)    2. Elevated troponin    3. Cellulitis of right hip    4. Diverticulitis    5. Lesion of pancreas    6.  Syncope and collapse          DISPOSITION/PLAN   DISPOSITION: Admit medical stable condition         (Please note that portions of this note were completed with a voice recognition program.  Efforts were made to edit the dictations but occasionally words are mis-transcribed.)    Alvin De Anda PA-C (electronically signed)           Wendie Cortes PA-C  05/03/21 5600

## 2021-05-03 NOTE — H&P
HOSPITALISTS HISTORY AND PHYSICAL    5/3/2021 5:12 PM    Patient Information:  Elis Duron is a 80 y.o. female [id number]  PCP:  Shavon Cosme MD (Tel: [phone] )    Chief complaint:    Chief Complaint   Patient presents with    Hip Pain     from [facility] by first care. pt c/o of hip pain squad says pressure is low and ekg looks like a fib.  no nurse spoke with juwan from [facility]       History of Present Illness:  Shabbir Lawrence is a 80 y.o. female who presented from nursing home with complaints of hip pain. Patient is from [facility]. Patient apparently had syncopal event with hypotension also been having fever. Patient was getting Tylenol at the nursing home. Patient is a poor historian does not provide good history. Most of the history obtained from chart. Only alert to person. This been complaining of hip pain and right hip discomfort. Not sure exactly why she is here. No GI/ complaints. Recurrent falls. Has multiple wound. REVIEW OF SYSTEMS:   Constitutional: Negative for fever,chills or night sweats  ENT: Negative for rhinorrhea, epistaxis, hoarseness, sore throat. Respiratory: Negative for shortness of breath,wheezing  Cardiovascular: Negative for chest pain, palpitations   Gastrointestinal: Negative for nausea, vomiting, diarrhea  Genitourinary: Negative for polyuria, dysuria   Hematologic/Lymphatic: Negative for bleeding tendency, easy bruising  Musculoskeletal: Negative for myalgias and arthralgias  Neurologic: Negative for confusion,dysarthria. Skin: Negative for itching,rash, good capillary refill. Psychiatric: Negative for depression,anxiety, agitation. Endocrine: Negative for polydipsia,polyuria,heat /cold intolerance.     Past Medical History:   has a past medical history of Anxiety, Diabetic neuropathy ([address] Dysphagia, Gait disorder, Hyperlipidemia, Hypertension, Meningioma ([address] Poor memory, TIA (transient

## 2021-05-03 NOTE — ED PROVIDER NOTES
Seldinger technique. All ports showed good, free flowing blood return and were flushed with saline solution. The catheter was then securely fastened to the skin with sutures and covered with a sterile dressing. A post procedure image was ordered and showing appropriate placement of right IJ line     The patient tolerated the procedure well. Complications: None     Ultrasound guided central venous access    Indication: need for central access as documented above  Views:  Long access view of target vein: Adequate  Short access view of target vein: Adequate  Adjacent artery: Adequate     Images obtained with findings:   Vein compressible: yes  Needle tip within vein: yes     Impression:   Successful cannulation of central vein: yes       SEP-1 CORE MEASURE DATA    Classification: septic shock    Amount of fluids ordered: at least 30mL/kg based on entered actual weight at time of triage    Time at which sepsis was identified: 1400    Broad-spectrum antibiotics chosen: based on sepsis order-set for a suspected source of: Skin and Soft Tissue    Repeat lactate level: improving  On reassessment after fluid resuscitation:   pending, to be completed by inpatient team    Patient is a 71-year-old female presenting from the nursing facility for hip pain. On arrival to the emergency room she is significantly hypotensive and tachycardic with a low-grade fever. There is concern for sepsis from her right hip wound versus UTI. IV fluids and antibiotics were started emergently. She did require central line placement for pressor therapy. EKG today appears to be multifocal atrial tachycardia not significant change from prior without ischemic change. She continued to protect her airway. Will require ICU for pressor therapy in the setting of septic shock. The total Critical Care time is 35 minutes which excludes separately billable procedures. Patient Referrals:  No follow-up provider specified.     Discharge

## 2021-05-03 NOTE — CARE COORDINATION
Discharge Planning Assessment    RN/SW discharge planner met with patient/ (and family member) to discuss reason for admission, current living situation, and potential needs at the time of discharge    Pt in hospital d/t septic shock     Demographics/Insurance verified:  Yes    Current type of dwelling:  Pt came from [facility]    Patient from ECF/SW confirmed with:  Squad and doctor notes. Living arrangements:  Currently residing at [facility]    Level of function/Support:  Pt is currently confused and only oriented to herself. Tentative discharge plan:  Back to [facility] most likely. Left message with Chiquita to see if pt will need a new precert to return. Transportation at the time of discharge:  Pt will need transport back.     Electronically signed by OCTAVIA Collins, EDISW on 5/3/2021 at 6:46 PM

## 2021-05-04 LAB
ANION GAP SERPL CALCULATED.3IONS-SCNC: 7 MMOL/L (ref 3–16)
BUN BLDV-MCNC: 28 MG/DL (ref 7–20)
CALCIUM SERPL-MCNC: 9.2 MG/DL (ref 8.3–10.6)
CHLORIDE BLD-SCNC: 109 MMOL/L (ref 99–110)
CO2: 27 MMOL/L (ref 21–32)
CREAT SERPL-MCNC: 0.8 MG/DL (ref 0.6–1.2)
ESTIMATED AVERAGE GLUCOSE: 116.9 MG/DL
GFR AFRICAN AMERICAN: >60
GFR NON-AFRICAN AMERICAN: >60
GLUCOSE BLD-MCNC: 112 MG/DL (ref 70–99)
GLUCOSE BLD-MCNC: 113 MG/DL (ref 70–99)
GLUCOSE BLD-MCNC: 129 MG/DL (ref 70–99)
GLUCOSE BLD-MCNC: 85 MG/DL (ref 70–99)
GLUCOSE BLD-MCNC: 91 MG/DL (ref 70–99)
GLUCOSE BLD-MCNC: 96 MG/DL (ref 70–99)
HBA1C MFR BLD: 5.7 %
HCT VFR BLD CALC: 25.1 % (ref 36–48)
HEMOGLOBIN: 8 G/DL (ref 12–16)
MCH RBC QN AUTO: 30.9 PG (ref 26–34)
MCHC RBC AUTO-ENTMCNC: 31.8 G/DL (ref 31–36)
MCV RBC AUTO: 97 FL (ref 80–100)
PDW BLD-RTO: 19.2 % (ref 12.4–15.4)
PERFORMED ON: ABNORMAL
PERFORMED ON: ABNORMAL
PERFORMED ON: NORMAL
PLATELET # BLD: 242 K/UL (ref 135–450)
PMV BLD AUTO: 8.2 FL (ref 5–10.5)
POTASSIUM REFLEX MAGNESIUM: 4 MMOL/L (ref 3.5–5.1)
PROCALCITONIN: 0.26 NG/ML (ref 0–0.15)
RBC # BLD: 2.59 M/UL (ref 4–5.2)
SARS-COV-2, PCR: NOT DETECTED
SODIUM BLD-SCNC: 143 MMOL/L (ref 136–145)
WBC # BLD: 13.9 K/UL (ref 4–11)

## 2021-05-04 PROCEDURE — 2580000003 HC RX 258: Performed by: PHYSICIAN ASSISTANT

## 2021-05-04 PROCEDURE — 6360000002 HC RX W HCPCS: Performed by: HOSPITALIST

## 2021-05-04 PROCEDURE — 80048 BASIC METABOLIC PNL TOTAL CA: CPT

## 2021-05-04 PROCEDURE — 2580000003 HC RX 258: Performed by: HOSPITALIST

## 2021-05-04 PROCEDURE — 84145 PROCALCITONIN (PCT): CPT

## 2021-05-04 PROCEDURE — 85027 COMPLETE CBC AUTOMATED: CPT

## 2021-05-04 PROCEDURE — 83036 HEMOGLOBIN GLYCOSYLATED A1C: CPT

## 2021-05-04 PROCEDURE — 92610 EVALUATE SWALLOWING FUNCTION: CPT

## 2021-05-04 PROCEDURE — 2000000000 HC ICU R&B

## 2021-05-04 PROCEDURE — 92526 ORAL FUNCTION THERAPY: CPT

## 2021-05-04 PROCEDURE — 6370000000 HC RX 637 (ALT 250 FOR IP): Performed by: HOSPITALIST

## 2021-05-04 RX ADMIN — DONEPEZIL HYDROCHLORIDE 10 MG: 5 TABLET, FILM COATED ORAL at 21:30

## 2021-05-04 RX ADMIN — CEFEPIME HYDROCHLORIDE 1000 MG: 1 INJECTION, POWDER, FOR SOLUTION INTRAMUSCULAR; INTRAVENOUS at 03:53

## 2021-05-04 RX ADMIN — GABAPENTIN 100 MG: 100 CAPSULE ORAL at 09:24

## 2021-05-04 RX ADMIN — CEFEPIME HYDROCHLORIDE 1000 MG: 1 INJECTION, POWDER, FOR SOLUTION INTRAMUSCULAR; INTRAVENOUS at 15:53

## 2021-05-04 RX ADMIN — SODIUM CHLORIDE: 9 INJECTION, SOLUTION INTRAVENOUS at 00:23

## 2021-05-04 RX ADMIN — ENOXAPARIN SODIUM 30 MG: 30 INJECTION SUBCUTANEOUS at 09:23

## 2021-05-04 RX ADMIN — CLOPIDOGREL BISULFATE 75 MG: 75 TABLET ORAL at 09:24

## 2021-05-04 RX ADMIN — GABAPENTIN 100 MG: 100 CAPSULE ORAL at 00:35

## 2021-05-04 RX ADMIN — Medication 10 ML: at 00:30

## 2021-05-04 RX ADMIN — SODIUM CHLORIDE: 9 INJECTION, SOLUTION INTRAVENOUS at 23:56

## 2021-05-04 RX ADMIN — DONEPEZIL HYDROCHLORIDE 10 MG: 5 TABLET, FILM COATED ORAL at 00:35

## 2021-05-04 RX ADMIN — SODIUM CHLORIDE: 9 INJECTION, SOLUTION INTRAVENOUS at 09:46

## 2021-05-04 RX ADMIN — METOPROLOL SUCCINATE 25 MG: 25 TABLET, EXTENDED RELEASE ORAL at 09:24

## 2021-05-04 RX ADMIN — GABAPENTIN 100 MG: 100 CAPSULE ORAL at 21:58

## 2021-05-04 RX ADMIN — SODIUM CHLORIDE: 9 INJECTION, SOLUTION INTRAVENOUS at 17:30

## 2021-05-04 RX ADMIN — ASPIRIN 81 MG: 81 TABLET, COATED ORAL at 09:24

## 2021-05-04 RX ADMIN — Medication 10 ML: at 09:26

## 2021-05-04 RX ADMIN — Medication 10 ML: at 21:58

## 2021-05-04 ASSESSMENT — PAIN SCALES - GENERAL
PAINLEVEL_OUTOF10: 0

## 2021-05-04 ASSESSMENT — PAIN SCALES - WONG BAKER
WONGBAKER_NUMERICALRESPONSE: 0

## 2021-05-04 NOTE — FLOWSHEET NOTE
4 Eyes Skin Assessment     NAME:  Lester Gloria  YOB: 1931  MEDICAL RECORD NUMBER:  [id number]    The patient is being assess for  Admission    I agree that 2 RN's have performed a thorough Head to Toe Skin Assessment on the patient. ALL assessment sites listed below have been assessed. Areas assessed by both nurses:    Head, Face, Ears, Shoulders, Back, Chest, Arms, Elbows, Hands, Sacrum. Buttock, Coccyx, Ischium and Legs. Feet and Heels        Does the Patient have a Wound? Yes wound(s) were present on assessment.  LDA wound assessment was Initiated and completed        Lokesh Prevention initiated:  Yes   Wound Care Orders initiated:  Yes    Pressure Injury (Stage 3,4, Unstageable, DTI, NWPT, and Complex wounds) if present place consult order under [de-identified] Yes    New and Established Ostomies if present place consult order under : NA      Nurse 1 eSignature: Electronically signed by Niranjan Hernandez RN on 5/4/21 at 3:44 AM EDT    **SHARE this note so that the co-signing nurse is able to place an eSignature**    Nurse 2 eSignature: Electronically signed by Kirit Go RN on 5/4/21 at 5:19 AM EDT

## 2021-05-04 NOTE — PROGRESS NOTES
Initial shift assessment completed, see complex assessment in doc flowsheet. VSS, patient Alert and oriented only to person. Lungs diminished, breathing easy, room air. Pure wick in place. Speech eval order placed, due to patient having difficulties swollen pills and food. Patient repositioned. Call light within reach, encouraged to call for nurse as needed throughout the shift.

## 2021-05-04 NOTE — PROGRESS NOTES
[facility] PROGRESS NOTE    5/4/2021 1:46 PM        Name: Renee Rodrigues Admitted: 5/3/2021  Primary Care Provider: Tahmina Ramsey MD (Tel: [phone])                        Subjective:  . No acute events overnight. Resting well. Pain control. Diet ok. Labs reviewed  Denies any chest pain sob.      Reviewed interval ancillary notes    Current Medications  [START ON 5/5/2021] vancomycin (VANCOCIN) 750 mg in dextrose 5 % 250 mL IVPB, Q36H  aspirin EC tablet 81 mg, Daily  clopidogrel (PLAVIX) tablet 75 mg, Daily  donepezil (ARICEPT) tablet 10 mg, Nightly  gabapentin (NEURONTIN) capsule 100 mg, BID  metoprolol succinate (TOPROL XL) extended release tablet 25 mg, Daily  sodium chloride flush 0.9 % injection 5-40 mL, 2 times per day  sodium chloride flush 0.9 % injection 5-40 mL, PRN  0.9 % sodium chloride infusion, PRN  enoxaparin (LOVENOX) injection 30 mg, Daily  promethazine (PHENERGAN) tablet 12.5 mg, Q6H PRN    Or  ondansetron (ZOFRAN) injection 4 mg, Q6H PRN  polyethylene glycol (GLYCOLAX) packet 17 g, Daily PRN  acetaminophen (TYLENOL) tablet 650 mg, Q6H PRN    Or  acetaminophen (TYLENOL) suppository 650 mg, Q6H PRN  cefepime (MAXIPIME) 1000 mg IVPB minibag, Q12H  glucose (GLUTOSE) 40 % oral gel 15 g, PRN  dextrose 50 % IV solution, PRN  glucagon (rDNA) injection 1 mg, PRN  dextrose 5 % solution, PRN  insulin lispro (1 Unit Dial) 0-6 Units, TID WC  insulin lispro (1 Unit Dial) 0-3 Units, Nightly  0.9 % sodium chloride infusion, Continuous  norepinephrine (LEVOPHED) 16 mg in dextrose 5% 250 mL infusion, Continuous        Objective:  BP 90/62   Pulse 109   Temp 97.3 °F (36.3 °C) (Temporal)   Resp 22   Ht 5' 2\" (1.575 m)   Wt 118 lb (53.5 kg)   SpO2 94%   BMI 21.58 kg/m²     Intake/Output Summary (Last 24 hours) at 5/4/2021 1346  Last data filed at 5/3/2021 1808  Gross per 24 hour   Intake 1250 ml   Output --   Net 1250 ml      Wt Readings from Last 3 Encounters:   05/04/21 118 lb (53.5 kg)   04/25/21 120 lb 5.9 oz (54.6 kg)   04/14/21 126 lb 1.6 oz (57.2 kg)       General appearance:  Appears comfortable  Eyes: Sclera clear. Pupils equal.  ENT: Moist oral mucosa. Trachea midline, no adenopathy. Cardiovascular: Regular rhythm, normal S1, S2. No murmur. No edema in lower extremities  Respiratory: Not using accessory muscles. Good inspiratory effort. Clear to auscultation bilaterally, no wheeze or crackles. GI: Abdomen soft, no tenderness, not distended, normal bowel sounds  Musculoskeletal: No cyanosis in digits, neck supple  Neurology: CN 2-12 grossly intact. No speech or motor deficits  Psych: Normal affect. Alert and oriented in time, place and person  Skin: Warm, dry, normal turgor    Labs and Tests:  CBC:   Recent Labs     05/03/21  1600 05/04/21  0400   WBC 14.2* 13.9*   HGB 7.7* 8.0*    242     BMP:    Recent Labs     05/03/21  1347 05/04/21  0400    143   K 5.3* 4.0    109   CO2 28 27   BUN 34* 28*   CREATININE 1.2 0.8   GLUCOSE 193* 113*     Hepatic:   Recent Labs     05/03/21  1347   AST 20   ALT 11   BILITOT 0.3   ALKPHOS 64       Discussed care with family and patient             Spent 30  minutes with patient and family at bedside and on unit reviewing medical records and labs, spent greater than 50% time counseling patient and family on diagnosis and plan   Problem List  Active Problems:    Sepsis ([address]  Resolved Problems:    * No resolved hospital problems. *       Assessment & Plan:   1. Sepsis  -With hypotensive WBC elevated  - still unclear source. Continue IV antibiotics  -Patient is still on low-dose pressors we will try to wean. Continue IV fluids  -We will de-escalate biotics based on culture    Syncope and collapse this is likely secondary to hypotension          Diet: DIET CARB CONTROL;  Dysphagia Pureed  Code:Full Code  DVT PPX lovemarie Shay MD   5/4/2021 1:46 PM

## 2021-05-04 NOTE — PROGRESS NOTES
CLINICAL BEDSIDE SWALLOWING EVALUATION  Speech Therapy Department    Patient Name:  Lester Gloria  :  1931  Pain level: Pt did not report pain  Medical Diagnosis:   Sepsis ([address] [A41.9]    HPI: Per chart: Lester Gloria is a 80 y.o. female who presented from nursing home with complaints of hip pain. Patient is from [facility]. Patient apparently had syncopal event with hypotension also been having fever. Patient was getting Tylenol at the nursing home. Patient is a poor historian does not provide good history. Most of the history obtained from chart. Only alert to person. This been complaining of hip pain and right hip discomfort. Not sure exactly why she is here. No GI/ complaints. Recurrent falls. Has multiple wound. Chest x-ray:  Right-sided central venous catheter with the distal tip in the right atrium   approximately 3 cm beyond the atrial junction with the superior vena cava. No pneumothorax     Pt has been seen by speech therapy in the past. Most recently she was seen 21 with recommendations for a dental soft diet with thin liquids. Per RN report this date, Pt took a bite of solid food and then expelled due to inability to chew. RN also reported difficulty with med pass. Treatment Diagnosis: Moderate Oropharyngeal Dysphagia    Impressions: Pt was seen sitting upright in bed, she reported no difficulty with swallowing and stated she consumes regular texture foods with thin liquids at facility. Pt reported she has upper dentures however they were not in place and unable to locate in room. Various textures were provided to assess swallow function. Pt presents with moderate oropharyngeal dysphagia characterized by prolonged/inefficient mastication, reduced bolus control, suspected premature bolus loss to pharynx, delayed swallow initiation, and reduced laryngeal elevation upon manual palpation.  Thin liquids via cup revealed suspected premature bolus loss to pharynx with delayed swallow initiation, no overt clinical s/s of aspiration/penetration were assessed. Given nectar thick liquids, change in vocal quality was noted x1 with delayed throat clear. Reduced A-P propulsion was noted with puree textures with adequate oral clearing achieved. Pt unable to effectively masticate soft solids (diced peach) and expelled bolus. Pt reported inability to masticate due to missing dentition. Recommend downgrade to Dysphagia I Pureed with thin liquids with use of aspiration precautions. Dietary Recommendations: Dysphagia I Pureed with thin liquids, meds crushed in puree     Strategies: 90 degree positioning with all p.o. intake; small bites/sips; alternate textures through meal; reduce rate of intake    Treatment/Goals: Speech therapy for dysphagia tx 3-5 times per week. ST.) Pt will tolerate recommended diet without s/s of aspiration   2.) If clinical symptoms of penetration/aspiration continue to be noted,Pt will tolerate MBS to r/o aspiration and determine appropriate diet/liquid level. 3.) Pt will tolerate diet advance to least restricted diet, as clinically indicated, with no signs of aspiration     Oral motor Exam:  Dentition: Pt missing upper dentures  Labial/Facial: within functional limits   Lingual:within functional limits   Voice: within functional limits     Oral Phase:   Reduced/prolonged mastication  Reduced A-P propulsion  Suspected premature bolus loss to pharynx    Pharyngeal Phase:  Suspected pharyngeal pooling  Delayed swallow initiation  Decreased laryngeal elevation via palpation  Change in vocal quality and throat clearing (delayed) with nectar thick liquids    Patient/Family Education:Education, results and recommendations given to the Pt and nurse, who verbalized understanding    Timed Code Treatment: 0 minutes    Total Treatment Time: 24 minutes     Discharge Recommendations: Speech Therapy for Speech/Dysphagia treatment at discharge.     If patient discharges prior to next session this note will serve as a discharge summary.      Agustina Rodriguez M.A., [facility]  Speech-Language Pathologist

## 2021-05-04 NOTE — PROGRESS NOTES
Clinical Pharmacy Note:    Pharmacy consulted to dose Vancomycin for sepsis per Dr. Emelia Love. Age: 80   Height: 5'2\"  Weight: 53.6 kg  Scr:1.2 mg/dL  Goal Trough: 12-20 mcg/ml      Started Vancomycin 750 IV q36. Trough ordered before 4th dose (5/08 @0400) with an order to hold if trough greater than 20mcg/ml. Thanks for the consult!   Gayle Martin, PharmD, [facility]

## 2021-05-04 NOTE — PROGRESS NOTES
Pt seen in  ED, admission completed. Pt is alert to self only. Pt lives at [facility], and is being admitted for septic shock. Plan of care updated, all questions answered.

## 2021-05-04 NOTE — PLAN OF CARE
Problem: Skin Integrity:  Goal: Will show no infection signs and symptoms  Description: Will show no infection signs and symptoms  5/4/2021 1130 by Tamiko Ochoa RN  Outcome: Ongoing  5/4/2021 0124 by Mellisa Franco RN  Outcome: Ongoing  Goal: Absence of new skin breakdown  Description: Absence of new skin breakdown  5/4/2021 1130 by Tamiko Ochoa RN  Outcome: Ongoing  5/4/2021 0124 by Mellisa Franco RN  Outcome: Ongoing     Problem: Falls - Risk of:  Goal: Will remain free from falls  Description: Will remain free from falls  5/4/2021 1130 by Tamiko Ochoa RN  Outcome: Ongoing  5/4/2021 0124 by Mellisa Franco RN  Outcome: Ongoing  Goal: Absence of physical injury  Description: Absence of physical injury  5/4/2021 1130 by Tamiko Ochoa RN  Outcome: Ongoing  5/4/2021 0124 by Mellisa Franco RN  Outcome: Ongoing

## 2021-05-05 LAB
ANION GAP SERPL CALCULATED.3IONS-SCNC: 6 MMOL/L (ref 3–16)
BUN BLDV-MCNC: 22 MG/DL (ref 7–20)
CALCIUM SERPL-MCNC: 8.5 MG/DL (ref 8.3–10.6)
CHLORIDE BLD-SCNC: 113 MMOL/L (ref 99–110)
CO2: 25 MMOL/L (ref 21–32)
CREAT SERPL-MCNC: 0.8 MG/DL (ref 0.6–1.2)
GFR AFRICAN AMERICAN: >60
GFR NON-AFRICAN AMERICAN: >60
GLUCOSE BLD-MCNC: 100 MG/DL (ref 70–99)
GLUCOSE BLD-MCNC: 113 MG/DL (ref 70–99)
GLUCOSE BLD-MCNC: 160 MG/DL (ref 70–99)
GLUCOSE BLD-MCNC: 79 MG/DL (ref 70–99)
GLUCOSE BLD-MCNC: 83 MG/DL (ref 70–99)
HCT VFR BLD CALC: 22.5 % (ref 36–48)
HEMOGLOBIN: 7.3 G/DL (ref 12–16)
MCH RBC QN AUTO: 31.3 PG (ref 26–34)
MCHC RBC AUTO-ENTMCNC: 32.6 G/DL (ref 31–36)
MCV RBC AUTO: 95.8 FL (ref 80–100)
PDW BLD-RTO: 18.9 % (ref 12.4–15.4)
PERFORMED ON: ABNORMAL
PERFORMED ON: NORMAL
PLATELET # BLD: 200 K/UL (ref 135–450)
PMV BLD AUTO: 7.9 FL (ref 5–10.5)
POTASSIUM SERPL-SCNC: 4.1 MMOL/L (ref 3.5–5.1)
RBC # BLD: 2.35 M/UL (ref 4–5.2)
REPORT: NORMAL
SODIUM BLD-SCNC: 144 MMOL/L (ref 136–145)
WBC # BLD: 8.4 K/UL (ref 4–11)

## 2021-05-05 PROCEDURE — 97530 THERAPEUTIC ACTIVITIES: CPT

## 2021-05-05 PROCEDURE — 80048 BASIC METABOLIC PNL TOTAL CA: CPT

## 2021-05-05 PROCEDURE — 6360000002 HC RX W HCPCS: Performed by: HOSPITALIST

## 2021-05-05 PROCEDURE — 2580000003 HC RX 258: Performed by: HOSPITALIST

## 2021-05-05 PROCEDURE — 85027 COMPLETE CBC AUTOMATED: CPT

## 2021-05-05 PROCEDURE — 6370000000 HC RX 637 (ALT 250 FOR IP): Performed by: HOSPITALIST

## 2021-05-05 PROCEDURE — 97166 OT EVAL MOD COMPLEX 45 MIN: CPT

## 2021-05-05 PROCEDURE — 97162 PT EVAL MOD COMPLEX 30 MIN: CPT

## 2021-05-05 PROCEDURE — 2000000000 HC ICU R&B

## 2021-05-05 RX ADMIN — Medication 10 ML: at 20:40

## 2021-05-05 RX ADMIN — CLOPIDOGREL BISULFATE 75 MG: 75 TABLET ORAL at 09:20

## 2021-05-05 RX ADMIN — SODIUM CHLORIDE: 9 INJECTION, SOLUTION INTRAVENOUS at 11:16

## 2021-05-05 RX ADMIN — GABAPENTIN 100 MG: 100 CAPSULE ORAL at 20:40

## 2021-05-05 RX ADMIN — SODIUM CHLORIDE 100 ML: 9 INJECTION, SOLUTION INTRAVENOUS at 03:49

## 2021-05-05 RX ADMIN — CEFEPIME HYDROCHLORIDE 1000 MG: 1 INJECTION, POWDER, FOR SOLUTION INTRAMUSCULAR; INTRAVENOUS at 03:50

## 2021-05-05 RX ADMIN — ENOXAPARIN SODIUM 30 MG: 30 INJECTION SUBCUTANEOUS at 09:22

## 2021-05-05 RX ADMIN — DONEPEZIL HYDROCHLORIDE 10 MG: 5 TABLET, FILM COATED ORAL at 20:40

## 2021-05-05 RX ADMIN — GABAPENTIN 100 MG: 100 CAPSULE ORAL at 09:20

## 2021-05-05 RX ADMIN — CEFEPIME HYDROCHLORIDE 1000 MG: 1 INJECTION, POWDER, FOR SOLUTION INTRAMUSCULAR; INTRAVENOUS at 16:20

## 2021-05-05 RX ADMIN — SODIUM CHLORIDE: 9 INJECTION, SOLUTION INTRAVENOUS at 18:33

## 2021-05-05 RX ADMIN — VANCOMYCIN HYDROCHLORIDE 750 MG: 750 INJECTION, POWDER, LYOPHILIZED, FOR SOLUTION INTRAVENOUS at 07:51

## 2021-05-05 RX ADMIN — Medication 10 ML: at 09:20

## 2021-05-05 RX ADMIN — ASPIRIN 81 MG: 81 TABLET, COATED ORAL at 09:20

## 2021-05-05 RX ADMIN — SODIUM CHLORIDE: 9 INJECTION, SOLUTION INTRAVENOUS at 04:59

## 2021-05-05 RX ADMIN — METOPROLOL SUCCINATE 25 MG: 25 TABLET, EXTENDED RELEASE ORAL at 09:20

## 2021-05-05 ASSESSMENT — PAIN SCALES - WONG BAKER
WONGBAKER_NUMERICALRESPONSE: 0

## 2021-05-05 ASSESSMENT — PAIN SCALES - GENERAL
PAINLEVEL_OUTOF10: 0

## 2021-05-05 NOTE — PROGRESS NOTES
Physical Therapy    Facility/Department: [facility] ICU  Initial Assessment    NAME: Jana Lucero  : 1931  MRN: [id number]    Date of Service: 2021    Discharge Recommendations: Jana Lucero scored a 8/24 on the AM-PAC short mobility form. Current research shows that an AM-PAC score of 17 or less is typically not associated with a discharge to the patient's home setting. Based on the patient's AM-PAC score and their current functional mobility deficits, it is recommended that the patient have 3-5 sessions per week of Physical Therapy at d/c to increase the patient's independence. Please see assessment section for further patient specific details. If patient discharges prior to next session this note will serve as a discharge summary. Please see below for the latest assessment towards goals. Assessment   Body structures, Functions, Activity limitations: Decreased functional mobility ; Increased pain;Decreased ADL status; Decreased balance;Decreased posture;Decreased ROM; Decreased strength;Decreased cognition;Decreased endurance  Assessment: Pt is a 80 y.o. female who arrived from [facility] after syncope and collapse. Pt presents with decreased ROM and strength, affecting her ability to participate in functional mobility tasks. Pt reports increased discomfort with motion. Pt has difficulty following commands for proper transfer technique, and demonstrates lack of abdominal strength, decreased glute, quad and hamstring strength, evidenced by inability to keep feet in proper position and inability to fully extend hips and knee in an erect posture. Pt would benefit from additional skilled PT services to increase endurance and strength to participate in functional tasks. Prognosis: Good  Decision Making: Medium Complexity  PT Education: Goals;Transfer Training;Energy Conservation;Orientation; Functional Mobility Training  Barriers to Learning: Difficulty following commands.   REQUIRES PT FOLLOW UP: Yes  Activity Tolerance  Activity Tolerance: Patient limited by fatigue;Patient limited by pain; Patient limited by endurance       Patient Diagnosis(es): The primary encounter diagnosis was Septic shock ([address] Diagnoses of Elevated troponin, Cellulitis of right hip, Diverticulitis, Lesion of pancreas, and Syncope and collapse were also pertinent to this visit. has a past medical history of AD (Alzheimer's disease) ([facility] Anxiety, Diabetic neuropathy ([address] Diverticulitis of large intestine without perforation or abscess, Dysphagia, Gait disorder, GERD (gastroesophageal reflux disease), Hyperlipidemia, Hypertension, Meningioma ([address] Mixed incontinence, Poor memory, Thyroid nodule, TIA (transient ischemic attack), and Type II or unspecified type diabetes mellitus without mention of complication, not stated as uncontrolled. has a past surgical history that includes Cholecystectomy; hysteroscopy; lipoma resection; and Colonoscopy. Restrictions  Restrictions/Precautions  Restrictions/Precautions: Fall Risk(high fall risk)  Required Braces or Orthoses?: No  Position Activity Restriction  Other position/activity restrictions: y.o. female who presented from nursing home with complaints of hip pain. Patient is from [facility]. Patient apparently had syncopal event with hypotension also been having fever. Patient was getting Tylenol at the nursing home. Patient is a poor historian does not provide good history.   Vision/Hearing        Subjective  General  Chart Reviewed: Yes  Patient assessed for rehabilitation services?: Yes  Response To Previous Treatment: Not applicable  Family / Caregiver Present: Yes(Son)  Follows Commands: Impaired  Pain Screening  Patient Currently in Pain: Denies(denies pain at rest, reports pain with movement)  Vital Signs  Patient Currently in Pain: Denies(But reports discomfort when moving)       Orientation  Orientation  Overall Orientation Status: Impaired  Orientation Level: knees and hips; 3+/5 hip flexion, 4-/5 ankle DF/PF, 3+/5 hip abduction  Strength LLE  Comment: Lacks ability to fully extend knees and hips; 3+/5 hip flexion, 4-/5 ankle DF/PF, 3+/5 hip abduction  Tone RLE  RLE Tone: Normotonic  Tone LLE  LLE Tone: Normotonic  Motor Control  Gross Motor?: WFL  Sensation  Overall Sensation Status: WFL     Transfers  Sit to Stand: 2 Person Assistance;Maximum Assistance  Stand to sit: Maximum Assistance(x1)  Squat Pivot Transfers: Maximum Assistance;2 Person Assistance  Comment: Pt attempts to perform sit to stand to RW with 2 person assist and transfer using the stedy. Pt is unable to fully extend hips for the stedy, so pt is transfered to chair with squat pivot transfer with max A x2. Ambulation  Ambulation?: No  Stairs/Curb  Stairs?: No     Balance  Posture: Fair  Sitting - Static: Good  Sitting - Dynamic: Fair  Standing - Static: Poor;-  Standing - Dynamic: Poor;-  Comments: Pt SBA for static sitting and CGA for dynamic sitting balance at the EOB. Pt demonstrates poor standing. Plan   Plan  Times per week: 3-5x/week  Times per day: Daily  Current Treatment Recommendations: Strengthening, ROM, Endurance Training, Balance Training, Functional Mobility Training, Transfer Training, Gait Training  Safety Devices  Type of devices: All fall risk precautions in place, Call light within reach, Chair alarm in place, Gait belt, Patient at risk for falls, Left in chair, Nurse notified  Restraints  Initially in place: No    G-Code  OutComes Score  AM-PAC Score  AM-PAC Inpatient Mobility Raw Score : 8 (05/05/21 1624)  AM-PAC Inpatient T-Scale Score : 28.52 (05/05/21 1624)  Mobility Inpatient CMS 0-100% Score: 86.62 (05/05/21 1624)  Mobility Inpatient CMS G-Code Modifier : CM (05/05/21 1624)    Goals  Short term goals  Time Frame for Short term goals: To be met at discharge.   Short term goal 1: Pt will complete bed mobility with SBA  Short term goal 2: Pt will perform supine to sit/sit to

## 2021-05-05 NOTE — PROGRESS NOTES
Speech Language Pathology  Lorenalex Serenity   1/16/1931     Attempted to see Pt for Speech/Dysphagia follow up treatment. Pt currently working with OT/PT and unable to participate in SLP treatment at this time. Will re-attempt at a later time as schedule allows.     David Daughters [id number]

## 2021-05-05 NOTE — PROGRESS NOTES
[facility]    5/5/2021 2:03 PM        Name: Reyna Shepard . Admitted: 5/3/2021  Primary Care Provider: Debbra Merlin, MD (Tel: [phone])                        Subjective:  . No acute events overnight. Resting well. Pain control. Diet ok. Labs reviewed  Denies any chest pain sob.      Reviewed interval ancillary notes    Current Medications  aspirin EC tablet 81 mg, Daily  clopidogrel (PLAVIX) tablet 75 mg, Daily  donepezil (ARICEPT) tablet 10 mg, Nightly  gabapentin (NEURONTIN) capsule 100 mg, BID  metoprolol succinate (TOPROL XL) extended release tablet 25 mg, Daily  sodium chloride flush 0.9 % injection 5-40 mL, 2 times per day  sodium chloride flush 0.9 % injection 5-40 mL, PRN  0.9 % sodium chloride infusion, PRN  enoxaparin (LOVENOX) injection 30 mg, Daily  promethazine (PHENERGAN) tablet 12.5 mg, Q6H PRN    Or  ondansetron (ZOFRAN) injection 4 mg, Q6H PRN  polyethylene glycol (GLYCOLAX) packet 17 g, Daily PRN  acetaminophen (TYLENOL) tablet 650 mg, Q6H PRN    Or  acetaminophen (TYLENOL) suppository 650 mg, Q6H PRN  cefepime (MAXIPIME) 1000 mg IVPB minibag, Q12H  glucose (GLUTOSE) 40 % oral gel 15 g, PRN  dextrose 50 % IV solution, PRN  glucagon (rDNA) injection 1 mg, PRN  dextrose 5 % solution, PRN  insulin lispro (1 Unit Dial) 0-6 Units, TID WC  insulin lispro (1 Unit Dial) 0-3 Units, Nightly  0.9 % sodium chloride infusion, Continuous  norepinephrine (LEVOPHED) 16 mg in dextrose 5% 250 mL infusion, Continuous        Objective:  BP (!) 115/54   Pulse 104   Temp 97.5 °F (36.4 °C) (Temporal)   Resp 15   Ht 5' 2\" (1.575 m)   Wt 124 lb (56.2 kg)   SpO2 97%   BMI 22.68 kg/m²     Intake/Output Summary (Last 24 hours) at 5/5/2021 1403  Last data filed at 5/5/2021 0921  Gross per 24 hour   Intake 6836.76 ml   Output 1250 ml   Net 5586.76 ml Wt Readings from Last 3 Encounters:   05/05/21 124 lb (56.2 kg)   04/25/21 120 lb 5.9 oz (54.6 kg)   04/14/21 126 lb 1.6 oz (57.2 kg)       General appearance:  Appears comfortable  Eyes: Sclera clear. Pupils equal.  ENT: Moist oral mucosa. Trachea midline, no adenopathy. Cardiovascular: Regular rhythm, normal S1, S2. No murmur. No edema in lower extremities  Respiratory: Not using accessory muscles. Good inspiratory effort. Clear to auscultation bilaterally, no wheeze or crackles. GI: Abdomen soft, no tenderness, not distended, normal bowel sounds  Musculoskeletal: No cyanosis in digits, neck supple  Neurology: CN 2-12 grossly intact. No speech or motor deficits  Psych: Normal affect. Alert and oriented in time, place and person  Skin: Warm, dry, normal turgor    Labs and Tests:  CBC:   Recent Labs     05/03/21  1600 05/04/21  0400 05/05/21  0345   WBC 14.2* 13.9* 8.4   HGB 7.7* 8.0* 7.3*    242 200     BMP:    Recent Labs     05/03/21  1347 05/04/21  0400 05/05/21  0345    143 144   K 5.3* 4.0 4.1    109 113*   CO2 28 27 25   BUN 34* 28* 22*   CREATININE 1.2 0.8 0.8   GLUCOSE 193* 113* 83     Hepatic:   Recent Labs     05/03/21  1347   AST 20   ALT 11   BILITOT 0.3   ALKPHOS 64       Discussed care with family and patient             Spent 30  minutes with patient and family at bedside and on unit reviewing medical records and labs, spent greater than 50% time counseling patient and family on diagnosis and plan   Problem List  Active Problems:    Sepsis ([address]  Resolved Problems:    * No resolved hospital problems. *       Assessment & Plan:   1.  Sepsis  -With hypotensive WBC elevated  -Still no clear source of infection  -Patient has been weaned off of pressors  -We will de-escalate antibiotics continue cefepime stop Zosyn  -She continues to improve.  -Anticipate discharge tomorrow on p.o. antibiotics      Syncope and collapse this is likely secondary to hypotension          Diet: DIET CARB CONTROL;  Dysphagia Pureed  Dietary Nutrition Supplements: Other Oral Supplement (see comment)  Dietary Nutrition Supplements: Frozen Oral Supplement  Code:Full Code  DVT PPX mumtaz Le MD   5/5/2021 2:03 PM

## 2021-05-05 NOTE — PLAN OF CARE
Problem: Skin Integrity:  Goal: Will show no infection signs and symptoms  Description: Will show no infection signs and symptoms  Outcome: Ongoing  Goal: Absence of new skin breakdown  Description: Absence of new skin breakdown  Outcome: Ongoing     Problem: Falls - Risk of:  Goal: Will remain free from falls  Description: Will remain free from falls  Outcome: Ongoing  Goal: Absence of physical injury  Description: Absence of physical injury  Outcome: Ongoing     Problem: Discharge Planning:  Goal: Discharged to appropriate level of care  Description: Discharged to appropriate level of care  Outcome: Ongoing     Problem: Serum Glucose Level - Abnormal:  Goal: Ability to maintain appropriate glucose levels will improve  Description: Ability to maintain appropriate glucose levels will improve  Outcome: Ongoing

## 2021-05-05 NOTE — PROGRESS NOTES
Occupational Therapy   Occupational Therapy Initial Assessment  Date: 2021   Patient Name: Benjamin Huertas  MRN: [id number]     : 1931    Date of Service: 2021    Discharge Recommendations: Benjamin Huertas scored a 12/24 on the AM-PAC ADL Inpatient form. Current research shows that an AM-PAC score of 17 or less is typically not associated with a discharge to the patient's home setting. Based on the patient's AM-PAC score and their current ADL deficits, it is recommended that the patient have 3-5 sessions per week of Occupational Therapy at d/c to increase the patient's independence. Please see assessment section for further patient specific details. If patient discharges prior to next session this note will serve as a discharge summary. Please see below for the latest assessment towards goals. OT Equipment Recommendations  Equipment Needed: No  Other: continue to assess    Assessment   Performance deficits / Impairments: Decreased functional mobility ; Decreased balance;Decreased cognition;Decreased ADL status; Decreased endurance;Decreased high-level IADLs;Decreased ROM  Assessment: Patient presents below baseline d/t above deficits; OT indicated to maximize safety/independence with ADL and IADL  Treatment Diagnosis: Above deficits associated with sepsis  Prognosis: Good  Decision Making: Medium Complexity  Exam: as above  OT Education: OT Role;Plan of Care  Patient Education: eval, discharge - patient not an independent learner, requires reinforcement for carryover  REQUIRES OT FOLLOW UP: Yes  Activity Tolerance  Activity Tolerance: Patient Tolerated treatment well;Patient limited by fatigue  Safety Devices  Safety Devices in place: Yes  Type of devices: All fall risk precautions in place;Nurse notified;Call light within reach; Left in chair;Chair alarm in place;Gait belt           Patient Diagnosis(es): The primary encounter diagnosis was Septic shock ([address]  Diagnoses of [address])  Type of Home: House  Home Layout: One level  Home Access: Stairs to enter with rails  Entrance Stairs - Number of Steps: 5-6 MERLY  Entrance Stairs - Rails: Both  Bathroom Shower/Tub: Tub/Shower unit, Shower chair with back  Bathroom Toilet: Standard  Bathroom Equipment: Toilet raiser  Home Equipment: 4 wheeled walker, Cane, Lift chair  ADL Assistance: Needs assistance(SBA/supervision of dtr for bathing)  Homemaking Responsibilities: No  Ambulation Assistance: Needs assistance(states she uses RW walker to ambulate but \"waits until daughter is there)  Transfer Assistance: Needs assistance(assist for bed mobility)  Active : No  Additional Comments: Pt admitted from [place] [address] where she was getting therapy. Two hospitalizations in month of April 2021. Reports 2 person assist to w/c for mobility. Pt questionable historian, information obtained from prior therapy evaluation 4/25/2021       Objective   Vision: Impaired  Vision Exceptions: Wears glasses for distance;Wears glasses for reading  Hearing: Exceptions to [facility]  Hearing Exceptions: Hard of hearing/hearing concerns; No hearing aid    Orientation  Overall Orientation Status: Impaired  Orientation Level: Disoriented to situation;Disoriented to time;Oriented to person;Oriented to place  Observation/Palpation  Posture: Fair  Edema: Increased swelling in L hand  Balance  Sitting Balance: Stand by assistance(static sitting balance EOB)  Standing Balance: Dependent/Total  ADL  LE Dressing: Dependent/Total(depends change)  Toileting: Dependent/Total(pericare and depends change)  Tone RUE  RUE Tone: Normotonic  Tone LUE  LUE Tone: Normotonic  Coordination  Movements Are Fluid And Coordinated: Yes     Bed mobility  Rolling to Left: Minimal assistance  Rolling to Right: Minimal assistance  Supine to Sit: Maximum assistance  Sit to Supine: Maximum assistance  Scooting: Minimal assistance  Comment: Rolls side to side in bed with above assist for pericare and depends change  Transfers  Stand Pivot Transfers: 2 Person assistance(EOB > recliner)  Sit to stand: 2 Person assistance(x3, from EOB)  Stand to sit: 2 Person assistance(x3; to EOB x2, recliner)  Transfer Comments: STS EOB x2 with max x2 and RW, max x2 and stedy -- unable to achieve full hip extension to utilize stedy seat. SPT from EOB > recliner with max x2     Cognition  Overall Cognitive Status: Exceptions  Arousal/Alertness: Appropriate responses to stimuli  Following Commands:  Follows one step commands with repetition  Attention Span: Attends with cues to redirect  Memory: Decreased recall of recent events  Safety Judgement: Decreased awareness of need for assistance  Problem Solving: Assistance required to generate solutions;Assistance required to implement solutions  Initiation: Requires cues for some  Sequencing: Requires cues for some  Perception  Overall Perceptual Status: WFL     Sensation  Overall Sensation Status: WFL        LUE AROM (degrees)  LUE AROM : Exceptions  LUE General AROM: Limited end range shoulder flexion, abduction, IR - WFL distally  L Shoulder Flexion 0-180: 0-100  RUE AROM (degrees)  RUE AROM : Exceptions  RUE General AROM: Limited end range shoulder flexion, abduction, IR - WFL distally  R Shoulder Flexion 0-180: 0-100                      Plan   Plan  Times per week: 3-5  Times per day: Daily  Current Treatment Recommendations: Strengthening, Endurance Training, ROM, Balance Training, Functional Mobility Training, Safety Education & Training, Equipment Evaluation, Education, & procurement, Self-Care / ADL, Patient/Caregiver Education & Training    G-Code     OutComes Score                                                  AM-PAC Score        AM-PAC Inpatient Daily Activity Raw Score: 12 (05/05/21 1609)  AM-PAC Inpatient ADL T-Scale Score : 30.6 (05/05/21 1609)  ADL Inpatient CMS 0-100% Score: 66.57 (05/05/21 1609)  ADL Inpatient CMS G-Code Modifier : CL (05/05/21 1609)    Goals  Short term goals  Time Frame for Short term goals: discharge  Short term goal 1: UB ADL min A  Short term goal 2: LB ADL mod A  Short term goal 3: Fxl transfers max A  Short term goal 4: Grooming set up  Short term goal 5: Bed mobility min A  Long term goals  Time Frame for Long term goals : LTG=STG       Therapy Time   Individual Concurrent Group Co-treatment   Time In 1403         Time Out 1503         Minutes 60            Timed Code Treatment Minutes:   45 minutes    Total Treatment Minutes:  60 minutes    Flynn Kaplan OTR/L [id number]    Riley Stewart OT

## 2021-05-05 NOTE — PROGRESS NOTES
Comprehensive Nutrition Assessment    Type and Reason for Visit:  Positive Nutrition Screen(Pressure ulcer/wound)    Nutrition Recommendations/Plan:   Offer Boost pudding and magic cup BID    Nutrition Assessment:  Pt is nutritionally compromised upon admission as evidenced by increased nutrient needs d/t presence of R hip wound. Pt with poor PO intake, consuming less than 50% of meals on CCC/dysphagia pureed diet. Weight appears stable per hx in EMR. Pt has tolerated Boost pudding and magic cup at previous admissions; will order BID. Malnutrition Assessment:  Malnutrition Status:  Insufficient data      Estimated Daily Nutrient Needs:  Energy (kcal):  2925-8825; Weight Used for Energy Requirements:  Current(56 kg)     Protein (g):  78-95 grams; Weight Used for Protein Requirements:  Current(56 kg; 1.4-1.7 grams per kg)        Fluid (ml/day):   ; Method Used for Fluid Requirements:  1 ml/kcal      Nutrition Related Findings:  Trace BLE edema. Oriented to person. +7 liters. LBM 5/5. Wounds:  Pressure Injury(R hip; not staged)       Current Nutrition Therapies:    DIET CARB CONTROL; Dysphagia Pureed    Anthropometric Measures:  · Height: 5' 2\" (157.5 cm)  · Current Body Weight: 123 lb 14.4 oz (56.2 kg)   · Ideal Body Weight: 110 lbs; % Ideal Body Weight 112.6 %   · BMI: 22.7  · BMI Categories: Normal Weight (BMI 18.5-24. 9)       Nutrition Diagnosis:   · Increased nutrient needs related to increase demand for energy/nutrients as evidenced by wounds      Nutrition Interventions:   Food and/or Nutrient Delivery:  Continue Current Diet, Start Oral Nutrition Supplement  Nutrition Education/Counseling:  Education not indicated   Coordination of Nutrition Care:  Continue to monitor while inpatient    Goals:  Pt will consume at least 50% of meals and supplements       Nutrition Monitoring and Evaluation:   Behavioral-Environmental Outcomes:  None Identified   Food/Nutrient Intake Outcomes:  Food and Nutrient Intake, Supplement Intake  Physical Signs/Symptoms Outcomes:  Skin     Discharge Planning:     Too soon to determine     Electronically signed by Madeleine Zarate RD, LD on 5/5/21 at 1:41 PM EDT    Contact: [phone]

## 2021-05-05 NOTE — PROGRESS NOTES
ADVANCED CARE PLANNING    Name:Tere Akers       :  1931              MRN:  [id number]      Purpose of Encounter: Advanced care planning in light of problem listed above   Parties in attendance: :Belinda Rodrigez MD, Family members:  Decisional Capacity:Yes    Diagnosis: Active Problems:    Sepsis ([address]  Resolved Problems:    * No resolved hospital problems. *    Patients Medical Story:Presented with worsening symptom of dx above. With at risk for life threatening event. Procedure and testing as noted in progress noted. We discussed patient long term goal and also wishes and aggressive care. Discussed in detail about code status and what it means with detailed explanation. Goals of Care Determinations: Patient wishes to focus on full code with aggressive care, CPR, intubation long term vent and facility as well. Plan: Will notify Lai Sommer MD of change in care plan. Will look at further interventions as needed. Code Status: At this time patient wishes to be Full Code  Time Spent with Patient: 21 minutes      Electronically signed by Bartolo Humphreys MD on 2021 at 5:13 PM  Thank you Lai Sommer MD for the opportunity to be involved in this patient's care.

## 2021-05-05 NOTE — PROGRESS NOTES
Lab called, with positive blood culture results:  Gram + cocci:  Staphylococcus epidermidis DNA Detected

## 2021-05-05 NOTE — PROGRESS NOTES
Initial shift assessment completed, see complex assessment in doc flowsheet. VSS. patient alert and oriented to person only, denies pain. Lungs diminished, breathing easy. Heart monitor shows ST. Purewick in place. Call light within reach, encouraged to call for nurse as needed throughout the shift.

## 2021-05-06 LAB
ANION GAP SERPL CALCULATED.3IONS-SCNC: 6 MMOL/L (ref 3–16)
BASOPHILS ABSOLUTE: 0 K/UL (ref 0–0.2)
BASOPHILS RELATIVE PERCENT: 0.7 %
BUN BLDV-MCNC: 17 MG/DL (ref 7–20)
CALCIUM SERPL-MCNC: 8.9 MG/DL (ref 8.3–10.6)
CHLORIDE BLD-SCNC: 112 MMOL/L (ref 99–110)
CO2: 26 MMOL/L (ref 21–32)
CREAT SERPL-MCNC: 0.6 MG/DL (ref 0.6–1.2)
EOSINOPHILS ABSOLUTE: 0.2 K/UL (ref 0–0.6)
EOSINOPHILS RELATIVE PERCENT: 2.5 %
GFR AFRICAN AMERICAN: >60
GFR NON-AFRICAN AMERICAN: >60
GLUCOSE BLD-MCNC: 121 MG/DL (ref 70–99)
GLUCOSE BLD-MCNC: 146 MG/DL (ref 70–99)
GLUCOSE BLD-MCNC: 152 MG/DL (ref 70–99)
GLUCOSE BLD-MCNC: 57 MG/DL (ref 70–99)
GLUCOSE BLD-MCNC: 81 MG/DL (ref 70–99)
GLUCOSE BLD-MCNC: 94 MG/DL (ref 70–99)
HCT VFR BLD CALC: 24.7 % (ref 36–48)
HEMOGLOBIN: 7.7 G/DL (ref 12–16)
LYMPHOCYTES ABSOLUTE: 1.4 K/UL (ref 1–5.1)
LYMPHOCYTES RELATIVE PERCENT: 21.4 %
MCH RBC QN AUTO: 30.1 PG (ref 26–34)
MCHC RBC AUTO-ENTMCNC: 31.3 G/DL (ref 31–36)
MCV RBC AUTO: 96.2 FL (ref 80–100)
MONOCYTES ABSOLUTE: 0.7 K/UL (ref 0–1.3)
MONOCYTES RELATIVE PERCENT: 10.4 %
NEUTROPHILS ABSOLUTE: 4.3 K/UL (ref 1.7–7.7)
NEUTROPHILS RELATIVE PERCENT: 65 %
PDW BLD-RTO: 18.7 % (ref 12.4–15.4)
PERFORMED ON: ABNORMAL
PERFORMED ON: NORMAL
PLATELET # BLD: 213 K/UL (ref 135–450)
PMV BLD AUTO: 7.6 FL (ref 5–10.5)
POTASSIUM SERPL-SCNC: 3.8 MMOL/L (ref 3.5–5.1)
RBC # BLD: 2.56 M/UL (ref 4–5.2)
SODIUM BLD-SCNC: 144 MMOL/L (ref 136–145)
WBC # BLD: 6.6 K/UL (ref 4–11)

## 2021-05-06 PROCEDURE — 6360000002 HC RX W HCPCS: Performed by: HOSPITALIST

## 2021-05-06 PROCEDURE — 6370000000 HC RX 637 (ALT 250 FOR IP): Performed by: HOSPITALIST

## 2021-05-06 PROCEDURE — 85025 COMPLETE CBC W/AUTO DIFF WBC: CPT

## 2021-05-06 PROCEDURE — 2580000003 HC RX 258: Performed by: HOSPITALIST

## 2021-05-06 PROCEDURE — 92526 ORAL FUNCTION THERAPY: CPT

## 2021-05-06 PROCEDURE — 1200000000 HC SEMI PRIVATE

## 2021-05-06 PROCEDURE — 80048 BASIC METABOLIC PNL TOTAL CA: CPT

## 2021-05-06 PROCEDURE — 2580000003 HC RX 258

## 2021-05-06 RX ORDER — SODIUM CHLORIDE 9 MG/ML
INJECTION, SOLUTION INTRAVENOUS
Status: COMPLETED
Start: 2021-05-06 | End: 2021-05-06

## 2021-05-06 RX ADMIN — ASPIRIN 81 MG: 81 TABLET, COATED ORAL at 10:34

## 2021-05-06 RX ADMIN — DEXTROSE MONOHYDRATE 12.5 G: 25 INJECTION, SOLUTION INTRAVENOUS at 07:06

## 2021-05-06 RX ADMIN — CEFEPIME HYDROCHLORIDE 1000 MG: 1 INJECTION, POWDER, FOR SOLUTION INTRAMUSCULAR; INTRAVENOUS at 18:26

## 2021-05-06 RX ADMIN — CEFEPIME HYDROCHLORIDE 1000 MG: 1 INJECTION, POWDER, FOR SOLUTION INTRAMUSCULAR; INTRAVENOUS at 04:27

## 2021-05-06 RX ADMIN — Medication 10 ML: at 10:35

## 2021-05-06 RX ADMIN — SODIUM CHLORIDE 100 ML: 9 INJECTION, SOLUTION INTRAVENOUS at 18:23

## 2021-05-06 RX ADMIN — DONEPEZIL HYDROCHLORIDE 10 MG: 5 TABLET, FILM COATED ORAL at 22:42

## 2021-05-06 RX ADMIN — ENOXAPARIN SODIUM 30 MG: 30 INJECTION SUBCUTANEOUS at 10:07

## 2021-05-06 RX ADMIN — GABAPENTIN 100 MG: 100 CAPSULE ORAL at 10:08

## 2021-05-06 RX ADMIN — METOPROLOL SUCCINATE 25 MG: 25 TABLET, EXTENDED RELEASE ORAL at 10:08

## 2021-05-06 RX ADMIN — GABAPENTIN 100 MG: 100 CAPSULE ORAL at 22:42

## 2021-05-06 RX ADMIN — Medication 10 ML: at 18:24

## 2021-05-06 RX ADMIN — Medication 10 ML: at 22:41

## 2021-05-06 RX ADMIN — CLOPIDOGREL BISULFATE 75 MG: 75 TABLET ORAL at 10:08

## 2021-05-06 ASSESSMENT — PAIN SCALES - GENERAL
PAINLEVEL_OUTOF10: 0

## 2021-05-06 ASSESSMENT — PAIN SCALES - WONG BAKER
WONGBAKER_NUMERICALRESPONSE: 0

## 2021-05-06 NOTE — PROGRESS NOTES
04/25/21 120 lb 5.9 oz (54.6 kg)   04/14/21 126 lb 1.6 oz (57.2 kg)       General appearance:  Appears comfortable  Eyes: Sclera clear. Pupils equal.  ENT: Moist oral mucosa. Trachea midline, no adenopathy. Cardiovascular: Regular rhythm, normal S1, S2. No murmur. No edema in lower extremities  Respiratory: Not using accessory muscles. Good inspiratory effort. Clear to auscultation bilaterally, no wheeze or crackles. GI: Abdomen soft, no tenderness, not distended, normal bowel sounds  Musculoskeletal: No cyanosis in digits, neck supple  Neurology: CN 2-12 grossly intact. No speech or motor deficits  Psych: Normal affect. Alert and oriented in time, place and person  Skin: Warm, dry, normal turgor    Labs and Tests:  CBC:   Recent Labs     05/04/21  0400 05/05/21  0345 05/06/21  0704   WBC 13.9* 8.4 6.6   HGB 8.0* 7.3* 7.7*    200 213     BMP:    Recent Labs     05/04/21  0400 05/05/21  0345 05/06/21  0704    144 144   K 4.0 4.1 3.8    113* 112*   CO2 27 25 26   BUN 28* 22* 17   CREATININE 0.8 0.8 0.6   GLUCOSE 113* 83 81     Hepatic:   Recent Labs     05/03/21  1347   AST 20   ALT 11   BILITOT 0.3   ALKPHOS 64       Discussed care with family and patient             Spent 30  minutes with patient and family at bedside and on unit reviewing medical records and labs, spent greater than 50% time counseling patient and family on diagnosis and plan   Problem List  Active Problems:    Sepsis ([address]  Resolved Problems:    * No resolved hospital problems. *       Assessment & Plan:   1. Sepsis  -With hypotensive WBC elevated  -Still no clear source of infection  -Patient has been weaned off of pressors  -dc all abx  - meds adjusted  - stable for discharge to SNF     Syncope and collapse this is likely secondary to hypotension          Diet: Dietary Nutrition Supplements: Standard High Calorie Oral Supplement  DIET CARB CONTROL;  Dysphagia Soft and Bite-Sized  Code:Full Code  DVT PPX lovenox

## 2021-05-06 NOTE — CARE COORDINATION
Discharge planning note:    Continues to receive IV Cefepime. AM-PAC Scores:  OT - 12,  PT - 8    Precert pending for transfer back to [facility] through [employer].     Maria L Gomez RN BSN  Case Management  [phone]

## 2021-05-06 NOTE — DISCHARGE INSTR - COC
Continuity of Care Form    Patient Name: Renee Zuniga   :  1931  MRN:  [id number]    Admit date:  5/3/2021  Discharge date:  2021    Code Status Order: Full Code   Advance Directives:   [facility] Documentation       Date/Time Healthcare Directive Type of Healthcare Directive Copy in [address] Agent's Name Healthcare Agent's Phone Number    21  Unknown, patient unable to respond due to medical condition -- -- -- -- --            Admitting Physician:  Courtney Phan MD  PCP: Tahmina Ramsey MD    Discharging Nurse: Mitchell Chapa [address] Unit/Room#: XZP-9057/0068-22  Discharging Unit Phone Number: [phone]    Emergency Contact:   Extended Emergency Contact Information  Primary Emergency Contact: Angel Connell, [address] Phone: [phone]  Relation: Child    Past Surgical History:  Past Surgical History:   Procedure Laterality Date    CHOLECYSTECTOMY      COLONOSCOPY      HYSTEROSCOPY      LIPOMA RESECTION         Immunization History:   Immunization History   Administered Date(s) Administered    Influenza, Quadv, adjuvanted, 65 yrs +, IM, PF (Fluad) 2020    Pneumococcal Conjugate 13-valent ([id number]) 2015    Pneumococcal Polysaccharide (Vaabtftvh27) 2013       Active Problems:  Patient Active Problem List   Diagnosis Code    Diabetes mellitus type 2, controlled ([address] E11.9    Pure hypercholesterolemia E78.00    Gait disorder R26.9    Diabetic neuropathy ([address] E11.40    Low back pain M54.5    Meningioma ([address] D32.9    Poor memory R41.3    Risk for falls Z91.81    Hypertension I10    Late onset Alzheimer's disease without behavioral disturbance (HCC) G30.1, F02.80    Failure to thrive in adult R62.7    Moderate malnutrition ([address] E44.0    Syncope and collapse R55    Sepsis ([address] A41.9       Isolation/Infection:   Isolation            No Isolation          Patient Infection Status       Infection Onset Added Last Indicated Last Indicated By Review Planned Expiration Resolved Resolved By    None active    Resolved    COVID-19 Rule Out 05/03/21 05/03/21 05/03/21 COVID-19 (Ordered)   05/04/21 Rule-Out Test Resulted    COVID-19 Rule Out 04/16/21 04/16/21 04/16/21 COVID-19 (Ordered)   04/17/21 Rule-Out Test Resulted            Nurse Assessment:  Last Vital Signs: BP (!) 105/58   Pulse 102   Temp 98.2 °F (36.8 °C) (Temporal)   Resp 13   Ht 5' 2\" (1.575 m)   Wt 124 lb 3.2 oz (56.3 kg)   SpO2 100%   BMI 22.72 kg/m²     Last documented pain score (0-10 scale): Pain Level: 0  Last Weight:   Wt Readings from Last 1 Encounters:   05/06/21 124 lb 3.2 oz (56.3 kg)     Mental Status:  disoriented and alert    IV Access:  - None    Nursing Mobility/ADLs:  Walking   Dependent  Transfer  Dependent  Bathing  Dependent  Dressing  Dependent  Toileting  Dependent  Feeding  Assisted  Med Admin  Assisted  Med Delivery   crushed in applesauce    Wound Care Documentation and Therapy:  Wound 04/13/21 Hip Right unstageable (Active)   Wound Image   04/15/21 1751   Wound Etiology Pressure Unstageable 04/27/21 0854   Dressing Status Dry; Intact; Clean 05/06/21 0400   Dressing/Treatment Foam 05/06/21 0400   Wound Length (cm) 1.75 cm 04/13/21 2130   Wound Width (cm) 2.5 cm 04/13/21 2130   Wound Surface Area (cm^2) 4.38 cm^2 04/13/21 2130   Wound Assessment Other (Comment) 05/06/21 0400   Drainage Amount None 05/06/21 0400   Odor None 05/06/21 0400   Lauryn-wound Assessment Fragile 05/06/21 0400   Margins Attached edges 05/06/21 0400   Number of days: 22       Wound 04/13/21 Groin Anterior;Right (Active)   Wound Etiology Other 04/25/21 1202   Wound Cleansed Wound cleanser 04/26/21 1200   Dressing/Treatment Open to air 04/26/21 1200   Wound Assessment Dry 04/26/21 1200   Drainage Amount None 04/26/21 1200   Odor None 04/26/21 1200   Lauryn-wound Assessment Intact 04/26/21 1200 Number of days: 22       Wound 04/13/21 Groin Right;Upper uppermost wound next to labia (Active)   Wound Etiology Other 04/25/21 1202   Wound Cleansed Wound cleanser 04/26/21 1200   Dressing/Treatment Open to air 04/26/21 1200   Wound Length (cm) 3.5 cm 04/13/21 2130   Wound Width (cm) 1 cm 04/13/21 2130   Wound Depth (cm) 0.5 cm 04/13/21 2130   Wound Surface Area (cm^2) 3.5 cm^2 04/13/21 2130   Wound Volume (cm^3) 1.75 cm^3 04/13/21 2130   Wound Assessment Dry 04/26/21 1200   Drainage Amount None 04/26/21 1200   Odor None 04/26/21 1200   Lauryn-wound Assessment Intact 04/26/21 1200   Number of days: 22       Wound 04/13/21 Groin Right; Lower lower wound next to labia (Active)   Wound Image   04/15/21 1751   Wound Etiology Other 04/25/21 1202   Wound Cleansed Wound cleanser 04/26/21 1200   Dressing/Treatment Open to air 04/26/21 1200   Wound Length (cm) 3 cm 04/13/21 2130   Wound Width (cm) 0.5 cm 04/13/21 2130   Wound Depth (cm) 0.25 cm 04/13/21 2130   Wound Surface Area (cm^2) 1.5 cm^2 04/13/21 2130   Wound Volume (cm^3) 0.38 cm^3 04/13/21 2130   Wound Assessment Dry 04/26/21 1200   Drainage Amount None 04/26/21 1200   Odor None 04/26/21 1200   Lauryn-wound Assessment Intact 04/26/21 1200   Number of days: 22       Wound Vagina Right labia (Active)   Wound Etiology Other 04/25/21 1202   Wound Cleansed Wound cleanser 04/26/21 1200   Dressing/Treatment Open to air 04/26/21 1200   Wound Length (cm) 1.5 cm 04/13/21 2130   Wound Width (cm) 0.25 cm 04/13/21 2130   Wound Depth (cm) 0.25 cm 04/13/21 2130   Wound Surface Area (cm^2) 0.38 cm^2 04/13/21 2130   Wound Volume (cm^3) 0.09 cm^3 04/13/21 2130   Wound Assessment Dry 04/26/21 1200   Drainage Amount None 04/26/21 1200   Drainage Description Serosanguinous 04/25/21 1202   Odor None 04/26/21 1200   Lauryn-wound Assessment Intact 04/26/21 1200   Number of days:        Wound 04/13/21 Buttocks Right; Lower stage 2 (Active)   Wound Etiology Pressure Stage  2 04/25/21 1202 Dressing Status New dressing applied 04/26/21 1200   Wound Cleansed Wound cleanser 04/26/21 1200   Dressing/Treatment Foam 04/26/21 1200   Wound Assessment Pink/red 04/26/21 1200   Drainage Amount None 04/26/21 1200   Odor None 04/26/21 1200   Lauryn-wound Assessment Excoriated 04/25/21 1202   Number of days: 22       Wound 04/13/21 Groin Left to the left of left labia (Active)   Wound Image   04/15/21 1751   Wound Etiology Other 04/25/21 1202   Wound Cleansed Wound cleanser 04/26/21 1200   Dressing/Treatment Open to air 04/26/21 1200   Wound Length (cm) 0.25 cm 04/13/21 2130   Wound Width (cm) 0.25 cm 04/13/21 2130   Wound Depth (cm) 0.25 cm 04/13/21 2130   Wound Surface Area (cm^2) 0.06 cm^2 04/13/21 2130   Wound Volume (cm^3) 0.02 cm^3 04/13/21 2130   Wound Assessment Dry 04/26/21 1200   Drainage Amount None 04/26/21 1200   Odor None 04/26/21 1200   Lauryn-wound Assessment Intact 04/26/21 1200   Number of days: 22       Wound 04/13/21 Groin Anterior; Left (Active)   Wound Etiology Other 04/25/21 1202   Wound Cleansed Wound cleanser 04/26/21 1200   Dressing/Treatment Open to air 04/26/21 1200   Wound Length (cm) 1 cm 04/13/21 2130   Wound Width (cm) 0.25 cm 04/13/21 2130   Wound Surface Area (cm^2) 0.25 cm^2 04/13/21 2130   Wound Assessment Dry 04/26/21 1200   Drainage Amount None 04/26/21 1200   Drainage Description Purulent 04/25/21 1202   Odor None 04/26/21 1200   Lauryn-wound Assessment Intact 04/26/21 1200   Number of days: 22       Wound 04/13/21 Buttocks Left (Active)   Wound Etiology Pressure Stage  2 04/25/21 1202   Dressing Status New dressing applied 04/26/21 1200   Wound Cleansed Wound cleanser 04/26/21 1200   Dressing/Treatment Foam 04/26/21 1200   Wound Assessment Pink/red 04/26/21 1200   Drainage Amount None 04/26/21 1200   Odor None 04/26/21 1200   Lauryn-wound Assessment Excoriated 04/26/21 1200   Number of days: 22        Elimination:  Continence:   · Bowel: No  · Bladder: No  Urinary Catheter: None Manager/ signature: Erica Diaz RN, BSN  [phone]       PHYSICIAN SECTION    Prognosis: Good    Condition at Discharge: Stable    Rehab Potential (if transferring to Rehab): Good    Recommended Labs or Other Treatments After Discharge:     Physician Certification: I certify the above information and transfer of Mimi Martinez  is necessary for the continuing treatment of the diagnosis listed and that she requires [place] for greater 30 days.      Update Admission H&P: No change in H&P    PHYSICIAN SIGNATURE:  Electronically signed by Kehinde Desir MD on 5/6/21 at 7:17 AM EDT

## 2021-05-06 NOTE — PROGRESS NOTES
Initial shift assessment completed, see complex assessment in doc flowsheet. Patient alert and oriented to person only, very pleasant, denies pain. VSS, breathing easy, unlabored room air. Call light within reach, encouraged to call for nurse as needed throughout the shift. Monitor shows ST. Purewick in place, draining well. Pills given crush with apple sauce.  Will continue to monitor

## 2021-05-06 NOTE — PLAN OF CARE
Problem: Skin Integrity:  Goal: Will show no infection signs and symptoms  Description: Will show no infection signs and symptoms  5/6/2021 0233 by Jordan Benitez RN  Outcome: Ongoing  5/5/2021 1320 by Pedro Hurt RN  Outcome: Ongoing  Goal: Absence of new skin breakdown  Description: Absence of new skin breakdown  5/6/2021 0233 by Jordan Benitez RN  Outcome: Ongoing  5/5/2021 1320 by Pedro Hurt RN  Outcome: Ongoing     Problem: Falls - Risk of:  Goal: Will remain free from falls  Description: Will remain free from falls  5/6/2021 0233 by Jordan Benitez RN  Outcome: Ongoing  5/5/2021 1320 by Pedro Hurt RN  Outcome: Ongoing  Goal: Absence of physical injury  Description: Absence of physical injury  5/6/2021 0233 by Jordan Benitez RN  Outcome: Ongoing  5/5/2021 1320 by Pedro Hurt RN  Outcome: Ongoing     Problem: Discharge Planning:  Goal: Discharged to appropriate level of care  Description: Discharged to appropriate level of care  5/6/2021 0233 by Jordan Benitez RN  Outcome: Ongoing  5/5/2021 1320 by Pedro Hurt RN  Outcome: Ongoing     Problem: Serum Glucose Level - Abnormal:  Goal: Ability to maintain appropriate glucose levels will improve  Description: Ability to maintain appropriate glucose levels will improve  5/6/2021 0233 by Jordan Benitez RN  Outcome: Ongoing  5/5/2021 1320 by Pedro Hurt RN  Outcome: Ongoing     Problem: Gas Exchange - Impaired:  Goal: Levels of oxygenation will improve  Description: Levels of oxygenation will improve  Outcome: Ongoing     Problem: Infection, Septic Shock:  Goal: Will show no infection signs and symptoms  Description: Will show no infection signs and symptoms  Outcome: Ongoing     Problem: Infection, Septic Shock:  Goal: Will show no infection signs and symptoms  Description: Will show no infection signs and symptoms  Outcome: Ongoing     Problem: Tissue Perfusion, Altered:  Goal: Circulatory function within specified parameters  Description: Circulatory function within specified parameters  Outcome: Ongoing     Problem: Venous Thromboembolism:  Goal: Will show no signs or symptoms of venous thromboembolism  Description: Will show no signs or symptoms of venous thromboembolism  Outcome: Ongoing  Goal: Absence of signs or symptoms of impaired coagulation  Description: Absence of signs or symptoms of impaired coagulation  Outcome: Ongoing     Problem: Infection:  Goal: Will remain free from infection  Description: Will remain free from infection  Outcome: Ongoing     Problem: Safety:  Goal: Free from accidental physical injury  Description: Free from accidental physical injury  Outcome: Ongoing  Goal: Free from intentional harm  Description: Free from intentional harm  Outcome: Ongoing     Problem: Daily Care:  Goal: Daily care needs are met  Description: Daily care needs are met  Outcome: Ongoing     Problem: Pain:  Goal: Patient's pain/discomfort is manageable  Description: Patient's pain/discomfort is manageable  Outcome: Ongoing     Problem: Skin Integrity:  Goal: Skin integrity will stabilize  Description: Skin integrity will stabilize  Outcome: Ongoing     Problem: Discharge Planning:  Goal: Patients continuum of care needs are met  Description: Patients continuum of care needs are met  Outcome: Ongoing

## 2021-05-06 NOTE — PROGRESS NOTES
4 Eyes Skin Assessment     NAME:  Pippa Hough OF BIRTH:  1/16/1931  MEDICAL RECORD NUMBER:  [id number]    The patient is being assess for  Transfer to New Unit    I agree that 2 RN's have performed a thorough Head to Toe Skin Assessment on the patient. ALL assessment sites listed below have been assessed. Areas assessed by both nurses:    Head, Face, Ears, Shoulders, Back, Chest, Arms, Elbows, Hands, Sacrum. Buttock, Coccyx, Ischium and Legs. Feet and Heels        Does the Patient have a Wound? Yes wound(s) were present on assessment.  LDA wound assessment was Initiated and completed        Lokesh Prevention initiated:  Yes   Wound Care Orders initiated:  Yes    Pressure Injury (Stage 3,4, Unstageable, DTI, NWPT, and Complex wounds) if present place consult order under [de-identified] Yes    New and Established Ostomies if present place consult order under : No      Nurse 1 eSignature: Electronically signed by Bertie Denver, RN on 5/6/21 at 6:40 PM EDT    **SHARE this note so that the co-signing nurse is able to place an eSignature**    Nurse 2 eSignature: Electronically signed by Domo Sanford RN on 5/7/21 at 4:26 AM EDT

## 2021-05-06 NOTE — PLAN OF CARE
Problem: Skin Integrity:  Goal: Will show no infection signs and symptoms  Description: Will show no infection signs and symptoms  5/6/2021 1604 by Matt Hughes RN  Outcome: Ongoing  5/6/2021 0233 by Molly Minor RN  Outcome: Ongoing  Goal: Absence of new skin breakdown  Description: Absence of new skin breakdown  5/6/2021 1604 by Matt Hughes RN  Outcome: Ongoing  5/6/2021 0233 by Molly Minor RN  Outcome: Ongoing     Problem: Falls - Risk of:  Goal: Will remain free from falls  Description: Will remain free from falls  5/6/2021 1604 by Matt Hughes RN  Outcome: Ongoing  5/6/2021 0233 by Molly Minor RN  Outcome: Ongoing  Goal: Absence of physical injury  Description: Absence of physical injury  5/6/2021 1604 by Matt Hughes RN  Outcome: Ongoing  5/6/2021 0233 by Molly Minor RN  Outcome: Ongoing     Problem: Discharge Planning:  Goal: Discharged to appropriate level of care  Description: Discharged to appropriate level of care  5/6/2021 1604 by Matt Hughes RN  Outcome: Ongoing  5/6/2021 0233 by Molly Minor RN  Outcome: Ongoing     Problem: Serum Glucose Level - Abnormal:  Goal: Ability to maintain appropriate glucose levels will improve  Description: Ability to maintain appropriate glucose levels will improve  5/6/2021 1604 by Matt Hughes RN  Outcome: Ongoing  5/6/2021 0233 by Molly Minor RN  Outcome: Ongoing     Problem: Gas Exchange - Impaired:  Goal: Levels of oxygenation will improve  Description: Levels of oxygenation will improve  5/6/2021 1604 by Matt Hughes RN  Outcome: Ongoing  5/6/2021 0233 by Molly Minor RN  Outcome: Ongoing     Problem: Infection, Septic Shock:  Goal: Will show no infection signs and symptoms  Description: Will show no infection signs and symptoms  5/6/2021 1604 by Matt Hughes RN  Outcome: Ongoing  5/6/2021 0233 by Molly Minor RN  Outcome: Ongoing     Problem: Infection - Ventilator-Associated Pneumonia:  Goal: Absence of pulmonary infection  Description: Absence of pulmonary infection  5/6/2021 1604 by Aldo Benjamin RN  Outcome: Ongoing  5/6/2021 0233 by Worth Dubin, RN  Outcome: Ongoing     Problem: Tissue Perfusion, Altered:  Goal: Circulatory function within specified parameters  Description: Circulatory function within specified parameters  5/6/2021 1604 by Aldo Benjamin RN  Outcome: Ongoing  5/6/2021 0233 by Worth Dubin, RN  Outcome: Ongoing     Problem: Venous Thromboembolism:  Goal: Will show no signs or symptoms of venous thromboembolism  Description: Will show no signs or symptoms of venous thromboembolism  5/6/2021 1604 by Aldo Benjamin RN  Outcome: Ongoing  5/6/2021 0233 by Worth Dubin, RN  Outcome: Ongoing  Goal: Absence of signs or symptoms of impaired coagulation  Description: Absence of signs or symptoms of impaired coagulation  5/6/2021 1604 by Aldo Benjamin RN  Outcome: Ongoing  5/6/2021 0233 by Worth Dubin, RN  Outcome: Ongoing     Problem: Infection:  Goal: Will remain free from infection  Description: Will remain free from infection  5/6/2021 1604 by Aldo Benjamin RN  Outcome: Ongoing  5/6/2021 0233 by Worth Dubin, RN  Outcome: Ongoing     Problem: Safety:  Goal: Free from accidental physical injury  Description: Free from accidental physical injury  5/6/2021 1604 by Aldo Benjamin RN  Outcome: Ongoing  5/6/2021 0233 by Worth Dubin, RN  Outcome: Ongoing  Goal: Free from intentional harm  Description: Free from intentional harm  5/6/2021 1604 by Aldo Benjamin RN  Outcome: Ongoing  5/6/2021 0233 by Worth Dubin, RN  Outcome: Ongoing     Problem: Daily Care:  Goal: Daily care needs are met  Description: Daily care needs are met  5/6/2021 1604 by Aldo Benjamin RN  Outcome: Ongoing  5/6/2021 0233 by Worth Dubin, RN  Outcome: Ongoing     Problem:

## 2021-05-06 NOTE — PROGRESS NOTES
Speech Language Pathology  Dysphagia Treatment Note    Name:  Soha Myers  :   1931  Medical Diagnosis:  Sepsis ([address] [A41.9]  Treatment Diagnosis: Oropharyngeal Dysphagia  Pain level: denies     Current Diet Level: Dysphagia I Pureed with thin liquids, meds crushed in puree   Tolerance of Current Diet Level: Per chart review, no documented difficulties with current diet level noted. RN reported that pt dislikes current diet level. Assessment of Texture Tolerance:  -Impressions: Pt was positioned upright in bed by RN and SLP. Daughter present in room and brought in pt's dentures. Pt placed upper plate for assessment but declined to place lower partial. Trials of regular solid and thin liquids were provided to assess swallow function. Pt demonstrated prolonged/disorganized mastication with decreased bolus cohesion. Delayed oral clearing was noted. Pt appeared to benefit from use of liquid to assist with oral clearing. Multiple swallows were required to clear. No overt clinical s/s of aspiration/penetration assessed this date. Recommend advance to Dysphagia III Soft and Bite-Sized with thin liquids with meds with puree. Pt and daughter were provided education. Diet and Treatment Recommendations:  Advance to Dysphagia III Soft and Bite-Sized with thin liquids, meds with puree     (Dysphagia Goals addressed, if appropriate)  1.) Pt will tolerate recommended diet without s/s of aspiration (ongoing 2021)   2.) If clinical symptoms of penetration/aspiration continue to be noted,Pt will tolerate MBS to r/o aspiration and determine appropriate diet/liquid level. (ongoing 2021)   3.) Pt will tolerate diet advance to least restricted diet, as clinically indicated, with no signs of aspiration (ongoing 2021)     Plan:  Continued daily Dysphagia treatment with goals per plan of care.     Patient/Family Education:Education given to the Pt and nurse, who verbalized understanding    Discharge Recommendations:  Pt will benefit from continued skilled Speech Therapy for Dysphagia services, prior to returning home. Timed Code Treatment:  0 minutes     Total Treatment Time:  15 minutes     If patient discharges prior to next session this note will serve as a discharge summary.      Cirilo Marino, [facility]  Speech Language Pathologist

## 2021-05-06 NOTE — PROGRESS NOTES
Arrival to . Assessment complete. VSS. Pt incontinent of stool. Pericare complete. New brief applied. Pt repositioned in bed. Pt set up with supper. Pt able to feed self. Son at bedside. Pt denies needs at this time. Call light in lap. Bed alarm set. Will continue to monitor.  Electronically signed by Steve Carranza RN on 5/6/2021 at 6:40 PM

## 2021-05-07 LAB
BLOOD CULTURE, ROUTINE: NORMAL
CULTURE, BLOOD 2: ABNORMAL
CULTURE, BLOOD 2: ABNORMAL
GLUCOSE BLD-MCNC: 107 MG/DL (ref 70–99)
GLUCOSE BLD-MCNC: 70 MG/DL (ref 70–99)
GLUCOSE BLD-MCNC: 79 MG/DL (ref 70–99)
GLUCOSE BLD-MCNC: 80 MG/DL (ref 70–99)
GLUCOSE BLD-MCNC: 81 MG/DL (ref 70–99)
GLUCOSE BLD-MCNC: 97 MG/DL (ref 70–99)
ORGANISM: ABNORMAL
ORGANISM: ABNORMAL
PERFORMED ON: ABNORMAL
PERFORMED ON: NORMAL

## 2021-05-07 PROCEDURE — 2580000003 HC RX 258: Performed by: HOSPITALIST

## 2021-05-07 PROCEDURE — 1200000000 HC SEMI PRIVATE

## 2021-05-07 PROCEDURE — 6370000000 HC RX 637 (ALT 250 FOR IP): Performed by: HOSPITALIST

## 2021-05-07 PROCEDURE — 94760 N-INVAS EAR/PLS OXIMETRY 1: CPT

## 2021-05-07 PROCEDURE — 92526 ORAL FUNCTION THERAPY: CPT

## 2021-05-07 PROCEDURE — 6360000002 HC RX W HCPCS: Performed by: HOSPITALIST

## 2021-05-07 RX ORDER — ASPIRIN 81 MG/1
81 TABLET, CHEWABLE ORAL DAILY
Status: DISCONTINUED | OUTPATIENT
Start: 2021-05-07 | End: 2021-05-09 | Stop reason: HOSPADM

## 2021-05-07 RX ADMIN — ASPIRIN 81 MG: 81 TABLET, CHEWABLE ORAL at 09:32

## 2021-05-07 RX ADMIN — CEFEPIME HYDROCHLORIDE 1000 MG: 1 INJECTION, POWDER, FOR SOLUTION INTRAMUSCULAR; INTRAVENOUS at 06:06

## 2021-05-07 RX ADMIN — METOPROLOL SUCCINATE 25 MG: 25 TABLET, EXTENDED RELEASE ORAL at 08:38

## 2021-05-07 RX ADMIN — Medication 10 ML: at 21:07

## 2021-05-07 RX ADMIN — GABAPENTIN 100 MG: 100 CAPSULE ORAL at 08:38

## 2021-05-07 RX ADMIN — ENOXAPARIN SODIUM 30 MG: 30 INJECTION SUBCUTANEOUS at 08:38

## 2021-05-07 RX ADMIN — CEFEPIME HYDROCHLORIDE 1000 MG: 1 INJECTION, POWDER, FOR SOLUTION INTRAMUSCULAR; INTRAVENOUS at 18:13

## 2021-05-07 RX ADMIN — DONEPEZIL HYDROCHLORIDE 10 MG: 5 TABLET, FILM COATED ORAL at 21:07

## 2021-05-07 RX ADMIN — CLOPIDOGREL BISULFATE 75 MG: 75 TABLET ORAL at 08:38

## 2021-05-07 RX ADMIN — GABAPENTIN 100 MG: 100 CAPSULE ORAL at 21:07

## 2021-05-07 RX ADMIN — SODIUM CHLORIDE 25 ML: 9 INJECTION, SOLUTION INTRAVENOUS at 18:11

## 2021-05-07 RX ADMIN — Medication 10 ML: at 08:39

## 2021-05-07 ASSESSMENT — PAIN SCALES - GENERAL: PAINLEVEL_OUTOF10: 0

## 2021-05-07 NOTE — CARE COORDINATION
[facility] Ostomy Continence Nurse  Consult Note       NAME:  [address] [place] RECORD NUMBER:  [id number]  AGE: 80 y.o.    GENDER: female  : 1931  TODAY'S DATE:  2021    Subjective   Reason for WOCN Evaluation and Assessment: unstageable rigth hip and wound to right labia      Reyna Shepard is a 80 y.o. female referred by:   [x] Physician  [x] Nursing  [] Other:     Wound Identification:  Wound Type: pressure  Contributing Factors: chronic pressure, decreased mobility and incontinence of urine    Wound History: present on admission  Current Wound Care Treatment:  Foam dressing to right hip, wound to right labia open to air    Patient Goal of Care:  [x] Wound Healing  [] Odor Control  [] Palliative Care  [] Pain Control   [] Other:         PAST MEDICAL HISTORY        Diagnosis Date    AD [facility]'s [facility]) [facility]     Anxiety     Diabetic neuropathy ([facility]     Diverticulitis of large intestine without perforation or abscess     Dysphagia     Gait disorder     GERD (gastroesophageal reflux disease)     Hyperlipidemia     Hypertension     Meningioma (HCC)     Mixed incontinence     Poor memory     Thyroid nodule     TIA (transient ischemic attack)     Type II or unspecified type diabetes mellitus without mention of complication, not stated as uncontrolled        PAST SURGICAL HISTORY    Past Surgical History:   Procedure Laterality Date    CHOLECYSTECTOMY      COLONOSCOPY      HYSTEROSCOPY      LIPOMA RESECTION         FAMILY HISTORY    Family History   Problem Relation Age of Onset    Cancer Mother     Cancer Father     Diabetes Maternal Aunt     Diabetes Other        SOCIAL HISTORY    Social History     Tobacco Use    Smoking status: Never Smoker    Smokeless tobacco: Never Used    Tobacco comment: has been around lots of second hand smoke (mother)   Substance Use Topics    Alcohol use: No    Drug use: No       ALLERGIES    No Known Allergies    MEDICATIONS    No current facility-administered medications on file prior to encounter. Current Outpatient Medications on File Prior to Encounter   Medication Sig Dispense Refill    mirtazapine (REMERON) 7.5 MG tablet Take 7.5 mg by mouth nightly      gabapentin (NEURONTIN) 100 MG capsule TAKE ONE CAPSULE BY MOUTH THREE TIMES A DAY 45 capsule 0    metoprolol succinate (TOPROL XL) 25 MG extended release tablet TAKE ONE TABLET BY MOUTH DAILY 90 tablet 2    pravastatin (PRAVACHOL) 40 MG tablet TAKE ONE TABLET BY MOUTH DAILY 87 tablet 1    metFORMIN (GLUCOPHAGE) 500 MG tablet TAKE 1 TABLET BY MOUTH TWICE DAILY WITH MEALS 180 tablet 3    hydroCHLOROthiazide (MICROZIDE) 12.5 MG capsule TAKE ONE CAPSULE BY MOUTH EVERY MORNING 90 capsule 3    donepezil (ARICEPT) 10 MG tablet TAKE 1 TABLET BY MOUTH EVERY NIGHT AT BEDTIME 90 tablet 3    clopidogrel (PLAVIX) 75 MG tablet TAKE ONE TABLET BY MOUTH DAILY 90 tablet 3    aspirin 81 MG EC tablet Take 81 mg by mouth daily.            Objective    /69   Pulse 98   Temp 98 °F (36.7 °C) (Oral)   Resp 18   Ht 5' 2\" (1.575 m)   Wt 124 lb 3.2 oz (56.3 kg)   SpO2 98%   BMI 22.72 kg/m²     LABS:  WBC:    Lab Results   Component Value Date    WBC 6.6 05/06/2021     H/H:    Lab Results   Component Value Date    HGB 7.7 05/06/2021    HCT 24.7 05/06/2021     PTT:    Lab Results   Component Value Date    APTT 30.5 04/13/2021   [APTT}  PT/INR:    Lab Results   Component Value Date    PROTIME 13.7 05/03/2021    INR 1.18 05/03/2021     HgBA1c:    Lab Results   Component Value Date    LABA1C 5.7 05/04/2021       Assessment   Lokesh Risk Score: Lokesh Scale Score: 12    Patient Active Problem List   Diagnosis Code    Diabetes mellitus type 2, controlled ([facility] E11.9    Pure hypercholesterolemia E78.00    Gait disorder R26.9    Diabetic neuropathy ([facility] E11.40    Low back pain M54.5    Meningioma (HCC) D32.9    Poor memory R41.3    Risk for falls Z91.81    Hypertension I10    Late Patient has wounds to right hip and right labia. These are old wounds that had been documented last month when patient was here. Patient agreeable to visit. Wound to right hip has slough and eschar, old drainage noted to dressing. Wound to right labia appears with devitalized tissue; patient is incontinent and  She does scratch. Also a healing wound noted on buttocks, continue to use foam dressing. Currently has pure wick in place. Nurse Marilee Samuels at bedside. Will place orders       Right labia wound       Right hip wound         Left buttocks healing wound    Response to treatment:  Well tolerated by patient. Pain Assessment:  Severity:  0 / 10  Quality of pain: N/A  Wound Pain Timing/Severity: none  Premedicated: No    Plan   Plan of Care: provide yady-care  After each episode of incontinence  Apply foam dressing to right hip wound and buttocks. Apply zinc paste to yady and groin areas  Maintain purewick to contain urine  Help patient to turn every two hours to reduce pressure on buttocks and hips. Specialty Bed Required : No   [] Low Air Loss   [] Pressure Redistribution  [] Fluid Immersion  [] Bariatric  [] Total Pressure Relief  [] Other:     Current Diet: Dietary Nutrition Supplements: Standard High Calorie Oral Supplement  DIET CARB CONTROL;  Dysphagia Soft and Bite-Sized  Dietician consult:  No    Discharge Plan:  Placement for patient upon discharge: skilled nursing    Patient appropriate for Outpatient [address]: No    Referrals:  []   [] [facility]  [] Supplies  [] Other    Patient/Caregiver Teaching:  Level of patient/caregiver understanding able to:   [] Indicates understanding       [x] Needs reinforcement  [] Unsuccessful      [] Verbal Understanding  [] Demonstrated understanding       [] No evidence of learning  [] Refused teaching         [] N/A       Electronically signed by SAMREEN Braxton, RN  Wound/Ostomy Care on 5/7/2021 at 12:07 PM

## 2021-05-07 NOTE — PLAN OF CARE
Nutrition Problem #1: Increased nutrient needs  Intervention: Food and/or Nutrient Delivery: Continue Current Diet, Continue Oral Nutrition Supplement  Nutritional Goals: Pt will consume at least 50% of meals and supplements

## 2021-05-07 NOTE — PROGRESS NOTES
Physical Therapy  Patient being discharged tomorrow by 12pm.  Spoke with son regarding patient's condition. Plan to sign off.   William Oquendo, [place], ATC-R [id number]

## 2021-05-07 NOTE — PROGRESS NOTES
Speech Language Pathology  Dysphagia Treatment Note    Name:  Kwame Mora  :   1931  Medical Diagnosis:  Sepsis ([facility] [A41.9]  Treatment Diagnosis: Oropharyngeal Dysphagia  Pain level: denies     Current Diet Level: Dysphagia I Pureed with thin liquids, meds crushed in puree   Tolerance of Current Diet Level: No reported difficulty with current diet; however, RN indicated pt not very hungry this morning. Assessment of Texture Tolerance:  -Impressions: Pt seen sitting upright in bed, alert and cooperative. Pt wearing both upper and lower dentures during session. Pt reports no difficulty with current diet and that she is liking eating more solid food now. Agreeable to snack of regular texture floresita crackers and thin liquid water via cup. Pt demonstrated adequate breakdown of bolus with minimally increased time. She did utilize liquid rinse at times to clear from oral cavity, but no oral residue was noted. Thin liquids revealed mild oral holding while chewing on ice; suspect premature spillage to pharynx. Delayed swallow initiation noted. No overt s/s aspiration noted. Recommend continue current diet. Diet and Treatment Recommendations:  Continue Dysphagia III Soft and Bite-Sized with thin liquids, meds with puree     (Dysphagia Goals addressed, if appropriate)  1.) Pt will tolerate recommended diet without s/s of aspiration (ongoing 2021)   2.) If clinical symptoms of penetration/aspiration continue to be noted,Pt will tolerate MBS to r/o aspiration and determine appropriate diet/liquid level. (ongoing 2021)   3.) Pt will tolerate diet advance to least restricted diet, as clinically indicated, with no signs of aspiration (ongoing 2021)     Plan:  Continued daily Dysphagia treatment with goals per plan of care. Patient/Family Education:Education given to the Pt and nurse, who verbalized understanding. Suspect pt requires ongoing education.     Discharge Recommendations:  Pt will benefit from continued skilled Speech Therapy for Dysphagia services, prior to returning home. Timed Code Treatment:  0 minutes     Total Treatment Time:  12 minutes     If patient discharges prior to next session this note will serve as a discharge summary.      Clint Riley M.S. [id number] [facility]  Speech-Language Pathologist

## 2021-05-07 NOTE — PROGRESS NOTES
Comprehensive Nutrition Assessment    Type and Reason for Visit:  Reassess    Nutrition Recommendations/Plan:   Continue to offer Ensure Enlive TID     Nutrition Assessment:  Pt's nutrition status is declining as evidenced by PO intake 0-25% of meals on CCC/dysphagia soft and bite-sized diet. Pt does like chocolate Ensure Enlive and is drinking this TID. Will continue to offer supplement to promote healing of multiples stage II pressure ulcers. Malnutrition Assessment:  Malnutrition Status:  Insufficient data      Estimated Daily Nutrient Needs:  Energy (kcal):  7895-1091; Weight Used for Energy Requirements:  Current(56 kg)     Protein (g):  78-95 grams; Weight Used for Protein Requirements:  Current(56 kg; 1.4-1.7 grams per kg)        Fluid (ml/day):   ; Method Used for Fluid Requirements:  1 ml/kcal      Nutrition Related Findings:  Generalized trace edema. Trace BLE edema. +7.4 liters. LBM 5/6. Oriented to person. Wounds:  Stage II, Pressure Injury, Multiple       Current Nutrition Therapies:    Dietary Nutrition Supplements: Standard High Calorie Oral Supplement  DIET CARB CONTROL; Dysphagia Soft and Bite-Sized    Anthropometric Measures:  · Height: 5' 2\" (157.5 cm)  · Current Body Weight: 123 lb 14.4 oz (56.2 kg)   · Ideal Body Weight: 110 lbs; % Ideal Body Weight 112.6 %   · BMI: 22.7  · BMI Categories: Normal Weight (BMI 18.5-24. 9)       Nutrition Diagnosis:   · Increased nutrient needs related to increase demand for energy/nutrients as evidenced by wounds      Nutrition Interventions:   Food and/or Nutrient Delivery:  Continue Current Diet, Continue Oral Nutrition Supplement  Nutrition Education/Counseling:  Education not indicated   Coordination of Nutrition Care:  Continue to monitor while inpatient    Goals:  Pt will consume at least 50% of meals and supplements       Nutrition Monitoring and Evaluation:   Behavioral-Environmental Outcomes:  None Identified   Food/Nutrient Intake Outcomes: Food and Nutrient Intake, Supplement Intake  Physical Signs/Symptoms Outcomes:  Skin     Discharge Planning:    Continue Oral Nutrition Supplement     Electronically signed by Mary Day RD, LD on 5/7/21 at 1:34 PM EDT    Contact: [phone]

## 2021-05-07 NOTE — CARE COORDINATION
CM received call from [facility] at [facility] [phone] and pre cert pending and updated pt is off of all abx's. He is aware CM will set up transport for 4 or after. CM set up transport through [facility] for 5pm this evening pending pre cert. Cm called pt's daughter and updated and RN Early Lawman is aware.        Soren Pruett RN, BSN  [phone]

## 2021-05-07 NOTE — CARE COORDINATION
CM called and left  for Tiki at [place] [address] to check on status of pre cert. CM updated patient.     Yoko Encarnacion RN, BSN  [phone]

## 2021-05-07 NOTE — CARE COORDINATION
CM called Marie at [facility] [phone] to check if authorization was received and received call back from Marie that it has not been received yet. CM called [facility] and scheduled transport for tomorrow for noon pending authorization. CM called daughter Lluu Eisenberg and updated. Therapy states family member room and will update them for CM. Hens in transport packet. Will need faxed with [facility] when pt discharges.       Bandar Arana RN, BSN  [phone]

## 2021-05-08 LAB
GLUCOSE BLD-MCNC: 117 MG/DL (ref 70–99)
GLUCOSE BLD-MCNC: 202 MG/DL (ref 70–99)
GLUCOSE BLD-MCNC: 63 MG/DL (ref 70–99)
GLUCOSE BLD-MCNC: 79 MG/DL (ref 70–99)
GLUCOSE BLD-MCNC: 82 MG/DL (ref 70–99)
GLUCOSE BLD-MCNC: 82 MG/DL (ref 70–99)
GLUCOSE BLD-MCNC: 99 MG/DL (ref 70–99)
PERFORMED ON: ABNORMAL
PERFORMED ON: NORMAL

## 2021-05-08 PROCEDURE — 2580000003 HC RX 258: Performed by: HOSPITALIST

## 2021-05-08 PROCEDURE — 1200000000 HC SEMI PRIVATE

## 2021-05-08 PROCEDURE — 6370000000 HC RX 637 (ALT 250 FOR IP): Performed by: HOSPITALIST

## 2021-05-08 PROCEDURE — 6360000002 HC RX W HCPCS: Performed by: HOSPITALIST

## 2021-05-08 RX ADMIN — Medication 10 ML: at 21:04

## 2021-05-08 RX ADMIN — ENOXAPARIN SODIUM 30 MG: 30 INJECTION SUBCUTANEOUS at 10:42

## 2021-05-08 RX ADMIN — INSULIN LISPRO 1 UNITS: 100 INJECTION, SOLUTION INTRAVENOUS; SUBCUTANEOUS at 21:25

## 2021-05-08 RX ADMIN — METOPROLOL SUCCINATE 25 MG: 25 TABLET, EXTENDED RELEASE ORAL at 10:42

## 2021-05-08 RX ADMIN — ASPIRIN 81 MG: 81 TABLET, CHEWABLE ORAL at 10:42

## 2021-05-08 RX ADMIN — CLOPIDOGREL BISULFATE 75 MG: 75 TABLET ORAL at 10:42

## 2021-05-08 RX ADMIN — DONEPEZIL HYDROCHLORIDE 10 MG: 5 TABLET, FILM COATED ORAL at 21:04

## 2021-05-08 RX ADMIN — GABAPENTIN 100 MG: 100 CAPSULE ORAL at 21:04

## 2021-05-08 RX ADMIN — Medication 10 ML: at 10:44

## 2021-05-08 RX ADMIN — GABAPENTIN 100 MG: 100 CAPSULE ORAL at 10:42

## 2021-05-08 RX ADMIN — Medication 10 ML: at 10:43

## 2021-05-08 RX ADMIN — Medication 10 ML: at 10:45

## 2021-05-08 ASSESSMENT — PAIN SCALES - GENERAL
PAINLEVEL_OUTOF10: 0

## 2021-05-08 NOTE — CARE COORDINATION
CM left voice message for [facility]/ [facility] admissions to determine if pre-cert has been obtained.      JACY KasperN, CCM, RN  [facility]  [phone]

## 2021-05-08 NOTE — PROGRESS NOTES
Shift assessment completed. Routine vitals stable. Scheduled medications given. Patient is awake, alert and oriented. Respirations are easy and unlabored. Patient does not appear to be in distress. Call light within reach. Bed alarm on.

## 2021-05-08 NOTE — PROGRESS NOTES
Appears comfortable  Eyes: Sclera clear. Pupils equal.  ENT: Moist oral mucosa. Trachea midline, no adenopathy. Cardiovascular: Regular rhythm, normal S1, S2. No murmur. No edema in lower extremities  Respiratory: Not using accessory muscles. Good inspiratory effort. Clear to auscultation bilaterally, no wheeze or crackles. GI: Abdomen soft, no tenderness, not distended, normal bowel sounds  Musculoskeletal: No cyanosis in digits, neck supple  Neurology: CN 2-12 grossly intact. No speech or motor deficits  Psych: Normal affect. Alert and oriented in time, place and person  Skin: Warm, dry, normal turgor    Labs and Tests:  CBC:   Recent Labs     05/06/21  0704   WBC 6.6   HGB 7.7*        BMP:    Recent Labs     05/06/21  0704      K 3.8   *   CO2 26   BUN 17   CREATININE 0.6   GLUCOSE 81     Hepatic:   No results for input(s): AST, ALT, ALB, BILITOT, ALKPHOS in the last 72 hours. Discussed care with family and patient             Spent 30  minutes with patient and family at bedside and on unit reviewing medical records and labs, spent greater than 50% time counseling patient and family on diagnosis and plan   Problem List  Active Problems:    Sepsis ([address]  Resolved Problems:    * No resolved hospital problems. *       Assessment & Plan:   1. Sepsis  -With hypotensive WBC elevated  -Still no clear source of infection  -Patient has been weaned off of pressors  -dc all abx  - meds adjusted  - stable for discharge to SNF     Syncope and collapse this is likely secondary to hypotension          Diet: Dietary Nutrition Supplements: Standard High Calorie Oral Supplement  DIET CARB CONTROL;  Dysphagia Soft and Bite-Sized  Code:Full Code  DVT PPX lovenox       Sandy Guzman MD   5/8/2021 10:41 AM

## 2021-05-08 NOTE — PROGRESS NOTES
Patient ripped IJ dressing exposing insertion site. Received order to remove IJ per Dr. Jorge Chavez. IJ site cleansed with chlorhexidine swab, sutures removed, pt instructed to hold breath and IJ removed, pressure held at site for 10 minutes, vaseline gauze and 2x2 gauze applied, covered with tegaderm. IJ catheter intact, no complications.     Received order okay to leave IV out and insert IV as needed per Dr. Jorge Chavez

## 2021-05-08 NOTE — PROGRESS NOTES
[facility]    5/8/2021 10:41 AM        Name: Yazmin Bianchi . Admitted: 5/3/2021  Primary Care Provider: Renée Segal MD (Tel: [phone])                        Subjective:  . No acute events overnight. Resting well. Pain control. Diet ok. Labs reviewed  Denies any chest pain sob.      Reviewed interval ancillary notes    Current Medications  aspirin chewable tablet 81 mg, Daily  clopidogrel (PLAVIX) tablet 75 mg, Daily  donepezil (ARICEPT) tablet 10 mg, Nightly  gabapentin (NEURONTIN) capsule 100 mg, BID  metoprolol succinate (TOPROL XL) extended release tablet 25 mg, Daily  sodium chloride flush 0.9 % injection 5-40 mL, 2 times per day  sodium chloride flush 0.9 % injection 5-40 mL, PRN  0.9 % sodium chloride infusion, PRN  enoxaparin (LOVENOX) injection 30 mg, Daily  promethazine (PHENERGAN) tablet 12.5 mg, Q6H PRN    Or  ondansetron (ZOFRAN) injection 4 mg, Q6H PRN  polyethylene glycol (GLYCOLAX) packet 17 g, Daily PRN  acetaminophen (TYLENOL) tablet 650 mg, Q6H PRN    Or  acetaminophen (TYLENOL) suppository 650 mg, Q6H PRN  glucose (GLUTOSE) 40 % oral gel 15 g, PRN  dextrose 50 % IV solution, PRN  glucagon (rDNA) injection 1 mg, PRN  dextrose 5 % solution, PRN  insulin lispro (1 Unit Dial) 0-6 Units, TID WC  insulin lispro (1 Unit Dial) 0-3 Units, Nightly        Objective:  /76   Pulse 103   Temp 97.9 °F (36.6 °C) (Oral)   Resp 16   Ht 5' 2\" (1.575 m)   Wt 124 lb 3.2 oz (56.3 kg)   SpO2 97%   BMI 22.72 kg/m²     Intake/Output Summary (Last 24 hours) at 5/8/2021 1041  Last data filed at 5/8/2021 0708  Gross per 24 hour   Intake --   Output 1050 ml   Net -1050 ml      Wt Readings from Last 3 Encounters:   05/06/21 124 lb 3.2 oz (56.3 kg)   04/25/21 120 lb 5.9 oz (54.6 kg)   04/14/21 126 lb 1.6 oz (57.2 kg)       General appearance: Appears comfortable  Eyes: Sclera clear. Pupils equal.  ENT: Moist oral mucosa. Trachea midline, no adenopathy. Cardiovascular: Regular rhythm, normal S1, S2. No murmur. No edema in lower extremities  Respiratory: Not using accessory muscles. Good inspiratory effort. Clear to auscultation bilaterally, no wheeze or crackles. GI: Abdomen soft, no tenderness, not distended, normal bowel sounds  Musculoskeletal: No cyanosis in digits, neck supple  Neurology: CN 2-12 grossly intact. No speech or motor deficits  Psych: Normal affect. Alert and oriented in time, place and person  Skin: Warm, dry, normal turgor    Labs and Tests:  CBC:   Recent Labs     05/06/21  0704   WBC 6.6   HGB 7.7*        BMP:    Recent Labs     05/06/21  0704      K 3.8   *   CO2 26   BUN 17   CREATININE 0.6   GLUCOSE 81     Hepatic:   No results for input(s): AST, ALT, ALB, BILITOT, ALKPHOS in the last 72 hours. Discussed care with family and patient             Spent 30  minutes with patient and family at bedside and on unit reviewing medical records and labs, spent greater than 50% time counseling patient and family on diagnosis and plan   Problem List  Active Problems:    Sepsis ([address]  Resolved Problems:    * No resolved hospital problems. *       Assessment & Plan:   1. Sepsis  -With hypotensive WBC elevated  -Still no clear source of infection  -Patient has been weaned off of pressors  -dc all abx  - meds adjusted  - stable for discharge to SNF     Syncope and collapse this is likely secondary to hypotension          Diet: Dietary Nutrition Supplements: Standard High Calorie Oral Supplement  DIET CARB CONTROL;  Dysphagia Soft and Bite-Sized  Code:Full Code  DVT PPX lovenox       Adwoa Oleary MD   5/8/2021 10:41 AM

## 2021-05-08 NOTE — CARE COORDINATION
CM has not heard back from [facility] after two calls to agency. Transportation canceled. Nursing updated.     SAMREEN Patel, CCM, RN  [facility]  [phone]

## 2021-05-09 VITALS
WEIGHT: 124.2 LBS | HEIGHT: 62 IN | TEMPERATURE: 98.4 F | RESPIRATION RATE: 16 BRPM | HEART RATE: 102 BPM | DIASTOLIC BLOOD PRESSURE: 75 MMHG | BODY MASS INDEX: 22.86 KG/M2 | SYSTOLIC BLOOD PRESSURE: 120 MMHG | OXYGEN SATURATION: 96 %

## 2021-05-09 DIAGNOSIS — I10 ESSENTIAL HYPERTENSION: ICD-10-CM

## 2021-05-09 DIAGNOSIS — R00.2 HEART PALPITATIONS: ICD-10-CM

## 2021-05-09 LAB
GLUCOSE BLD-MCNC: 126 MG/DL (ref 70–99)
GLUCOSE BLD-MCNC: 127 MG/DL (ref 70–99)
GLUCOSE BLD-MCNC: 84 MG/DL (ref 70–99)
PERFORMED ON: ABNORMAL
PERFORMED ON: ABNORMAL
PERFORMED ON: NORMAL
SARS-COV-2, NAAT: NOT DETECTED

## 2021-05-09 PROCEDURE — 6360000002 HC RX W HCPCS: Performed by: HOSPITALIST

## 2021-05-09 PROCEDURE — 6370000000 HC RX 637 (ALT 250 FOR IP): Performed by: HOSPITALIST

## 2021-05-09 PROCEDURE — 87635 SARS-COV-2 COVID-19 AMP PRB: CPT

## 2021-05-09 RX ADMIN — GABAPENTIN 100 MG: 100 CAPSULE ORAL at 10:51

## 2021-05-09 RX ADMIN — METOPROLOL SUCCINATE 25 MG: 25 TABLET, EXTENDED RELEASE ORAL at 10:51

## 2021-05-09 RX ADMIN — CLOPIDOGREL BISULFATE 75 MG: 75 TABLET ORAL at 10:51

## 2021-05-09 RX ADMIN — ASPIRIN 81 MG: 81 TABLET, CHEWABLE ORAL at 10:51

## 2021-05-09 RX ADMIN — ENOXAPARIN SODIUM 30 MG: 30 INJECTION SUBCUTANEOUS at 10:51

## 2021-05-09 ASSESSMENT — PAIN SCALES - GENERAL: PAINLEVEL_OUTOF10: 0

## 2021-05-09 NOTE — PROGRESS NOTES
Report called to [facility] at [facility], faxed updated AVS/ETELVINA to [phone]

## 2021-05-09 NOTE — PLAN OF CARE
Problem: Skin Integrity:  Goal: Will show no infection signs and symptoms  Description: Will show no infection signs and symptoms  Outcome: Ongoing  Goal: Absence of new skin breakdown  Description: Absence of new skin breakdown  Outcome: Ongoing     Problem: Falls - Risk of:  Goal: Will remain free from falls  Description: Will remain free from falls  Outcome: Ongoing  Goal: Absence of physical injury  Description: Absence of physical injury  Outcome: Ongoing     Problem: Nutrition  Goal: Optimal nutrition therapy  Outcome: Ongoing     Problem: Serum Glucose Level - Abnormal:  Goal: Ability to maintain appropriate glucose levels will improve  Description: Ability to maintain appropriate glucose levels will improve  Outcome: Ongoing     Problem: Infection:  Goal: Will remain free from infection  Description: Will remain free from infection  Outcome: Ongoing     Problem: Safety:  Goal: Free from accidental physical injury  Description: Free from accidental physical injury  Outcome: Ongoing  Goal: Free from intentional harm  Description: Free from intentional harm  Outcome: Ongoing     Problem: Daily Care:  Goal: Daily care needs are met  Description: Daily care needs are met  Outcome: Ongoing     Problem: Pain:  Goal: Patient's pain/discomfort is manageable  Description: Patient's pain/discomfort is manageable  Outcome: Ongoing     Problem: Skin Integrity:  Goal: Skin integrity will stabilize  Description: Skin integrity will stabilize  Outcome: Ongoing

## 2021-05-09 NOTE — DISCHARGE SUMMARY
[facility] DISCHARGE SUMMARY    Patient Demographics    Patient. Carola Mena  Date of Birth. 1/16/1931  MRN. [id number]     Primary care provider. Irvin Baugh MD  (Tel: [phone])    Admit date: 5/3/2021    Discharge date (blank if same as Note Date): Note Date: 5/9/2021     Reason for Hospitalization. Chief Complaint   Patient presents with    Hip Pain     [facility] pt c/o of hip pain squad says pressure is low and ekg looks like a fib.  no nurse spoke with squad from [facility]. Hip pain  -With fall.  -With severe hypotension initially thought sepsis was ruled out antibiotics were discontinued patient improved with blood pressure medication discontinuation IV fluid patient was discharged stable all blood pressure medication discontinued time of discharge    Consults. None    Physical examination on discharge day. /65   Pulse 106   Temp 98.3 °F (36.8 °C) (Oral)   Resp 16   Ht 5' 2\" (1.575 m)   Wt 124 lb 3.2 oz (56.3 kg)   SpO2 97%   BMI 22.72 kg/m²   General appearance. Alert. Looks comfortable. HEENT. Sclera clear. Moist mucus membranes. Cardiovascular. Regular rate and rhythm, normal S1, S2. No murmur. Respiratory. Not using accessory muscles. Clear to auscultation bilaterally, no wheeze. Gastrointestinal. Abdomen soft, non-tender, not distended, normal bowel sounds  Neurology. Facial symmetry. No speech deficits. Moving all extremities equally. Extremities. No edema in lower extremities. Skin. Warm, dry, normal turgor    Condition at time of discharge stable     Medication instructions provided to patient at discharge.      Medication List      CONTINUE taking these medications    aspirin 81 MG EC tablet     clopidogrel 75 MG tablet  Commonly known as: PLAVIX  TAKE ONE TABLET BY MOUTH DAILY     donepezil 10 MG tablet  Commonly known as: ARICEPT  TAKE 1 TABLET BY MOUTH EVERY NIGHT AT BEDTIME     gabapentin 100 MG capsule  Commonly known as: NEURONTIN  TAKE ONE CAPSULE BY MOUTH THREE TIMES A DAY     hydroCHLOROthiazide 12.5 MG capsule  Commonly known as: MICROZIDE  TAKE ONE CAPSULE BY MOUTH EVERY MORNING     metFORMIN 500 MG tablet  Commonly known as: GLUCOPHAGE  TAKE 1 TABLET BY MOUTH TWICE DAILY WITH MEALS     metoprolol succinate 25 MG extended release tablet  Commonly known as: TOPROL XL  TAKE ONE TABLET BY MOUTH DAILY     mirtazapine 7.5 MG tablet  Commonly known as: REMERON     pravastatin 40 MG tablet  Commonly known as: PRAVACHOL  TAKE ONE TABLET BY MOUTH DAILY        STOP taking these medications    gentamicin 0.1 % ointment  Commonly known as: GARAMYCIN     lisinopril 10 MG tablet  Commonly known as: PRINIVIL;ZESTRIL     OneTouch Delica Plus [id number] Misc     triamcinolone 0.1 % cream  Commonly known as: KENALOG            Discharge recommendations given to patient. Follow Up. pcp in 1 week   Disposition. home  Activity. activity as tolerated  Diet: Dietary Nutrition Supplements: Standard High Calorie Oral Supplement  DIET CARB CONTROL; Dysphagia Soft and Bite-Sized      Spent 45  minutes in discharge process.     Signed:  Sahra Wilkerson MD     5/9/2021 2:37 PM

## 2021-05-09 NOTE — CARE COORDINATION
CM received a call from [address] [place] [address] that she had obtained pre-cert for admission. CM initiated transport with [facility], set for 1930. Nursing and family notified. Discharge Plan:     Patient discharged to:Discharge Facility: [facility]  Phone: [phone]  Fax: [phone]    SW/DC Planner faxed, [address] and AVS   Narcotic Prescriptions faxed were:  RN: Scotty Peña) will call report to:       Medical Transport with: [facility]  [phone]  [place] [address] up time:1930  Family advised of discharge?:yes  HENS Submitted?:  yes  All discharge needs met per case management.     JACY FrazierN, CCM, RN  [facility]  [phone]

## 2021-05-10 RX ORDER — METOPROLOL SUCCINATE 25 MG/1
TABLET, EXTENDED RELEASE ORAL
Qty: 90 TABLET | Refills: 1 | Status: SHIPPED | OUTPATIENT
Start: 2021-05-10 | End: 2021-09-03

## 2021-05-10 RX ORDER — HYDROCHLOROTHIAZIDE 12.5 MG/1
CAPSULE, GELATIN COATED ORAL
Qty: 90 CAPSULE | Refills: 1 | Status: SHIPPED | OUTPATIENT
Start: 2021-05-10 | End: 2021-09-08 | Stop reason: SINTOL

## 2021-05-10 NOTE — PROGRESS NOTES
Removed purewick from pt. Transport arrived to transport pt back to [facility]. All questions answered.

## 2021-05-28 DIAGNOSIS — E11.42 DIABETIC POLYNEUROPATHY ASSOCIATED WITH TYPE 2 DIABETES MELLITUS (HCC): ICD-10-CM

## 2021-06-01 RX ORDER — GABAPENTIN 100 MG/1
CAPSULE ORAL
Qty: 45 CAPSULE | Refills: 0 | Status: SHIPPED | OUTPATIENT
Start: 2021-06-01 | End: 2021-08-30

## 2021-06-30 ENCOUNTER — TELEPHONE (OUTPATIENT)
Dept: PRIMARY CARE CLINIC | Age: 86
End: 2021-06-30

## 2021-06-30 NOTE — TELEPHONE ENCOUNTER
----- Message from Ute Ashley sent at 6/30/2021  9:42 AM EDT -----  Subject: Message to Provider    QUESTIONS  Information for Provider? Patient is needing a script for a hospital bed   and lift chair. Souleymane Saldaña will come pick the script up.pls advise   ---------------------------------------------------------------------------  --------------  CALL BACK INFO  What is the best way for the office to contact you? OK to leave message on   voicemail  Preferred Call Back Phone Number? [phone]  ---------------------------------------------------------------------------  --------------  SCRIPT ANSWERS  Relationship to Patient? Other  Representative Name? Cassidy Guillory  Is the Representative on the appropriate HIPAA document in Epic?  Yes

## 2021-07-14 ENCOUNTER — TELEPHONE (OUTPATIENT)
Dept: PRIMARY CARE CLINIC | Age: 86
End: 2021-07-14

## 2021-07-14 NOTE — TELEPHONE ENCOUNTER
Pt daughter, [facility], is requesting order Fozia Breath Stand Aid.  Please fax order to [facility] in [place] Attn: Gal Suarez will call back to provide fax #

## 2021-07-26 ENCOUNTER — TELEPHONE (OUTPATIENT)
Dept: PRIMARY CARE CLINIC | Age: 86
End: 2021-07-26

## 2021-07-26 NOTE — TELEPHONE ENCOUNTER
Pt daughter asked if Dr. Raul Salazar is agreeable to pt receiving PCP care from [facility].  Pt is returning home from [facility] and transporting her for PCP visits would be difficult because pt is not mobile  Please contact Raj Paula at [phone]

## 2021-07-28 NOTE — TELEPHONE ENCOUNTER
Mariano Alvarenga from [address] [place] [address] called to FU on pt daughter request for a provider at [facility]\"" to take over pt primary care due to difficulty moving pt    If so, please send last H&P or last physical notes to Mariano Alvarenga at [phone]  Attn: Mariano Alvarenga  Ph: [phone]

## 2021-07-28 NOTE — TELEPHONE ENCOUNTER
I advised Archana Mo with [facility] of Dr. Fabiana Berger comments.  Archana Mo they will wait to transfer care until pt is officially discharged from [facility] on 7/30/21 because the nurse practitioner can't see pt while she is at [facility]

## 2021-08-04 ENCOUNTER — TELEPHONE (OUTPATIENT)
Dept: PRIMARY CARE CLINIC | Age: 86
End: 2021-08-04

## 2021-08-04 NOTE — TELEPHONE ENCOUNTER
Nikhil Gastelum [address] is requesting verbal orders to continue PT twice a week for 4 weeks and order for continuing   OT

## 2021-09-03 DIAGNOSIS — R00.2 HEART PALPITATIONS: ICD-10-CM

## 2021-09-03 DIAGNOSIS — E78.00 HYPERCHOLESTEREMIA: ICD-10-CM

## 2021-09-03 RX ORDER — METOPROLOL SUCCINATE 25 MG/1
TABLET, EXTENDED RELEASE ORAL
Qty: 30 TABLET | Refills: 0 | Status: SHIPPED
Start: 2021-09-03 | End: 2021-09-08 | Stop reason: SINTOL

## 2021-09-03 RX ORDER — PRAVASTATIN SODIUM 40 MG
TABLET ORAL
Qty: 30 TABLET | Refills: 0 | Status: SHIPPED | OUTPATIENT
Start: 2021-09-03

## 2021-09-07 ENCOUNTER — TELEPHONE (OUTPATIENT)
Dept: PRIMARY CARE CLINIC | Age: 86
End: 2021-09-07

## 2021-09-08 ENCOUNTER — TELEPHONE (OUTPATIENT)
Dept: PRIMARY CARE CLINIC | Age: 86
End: 2021-09-08

## 2021-09-08 NOTE — TELEPHONE ENCOUNTER
Spoke with Jericho Ramon to have pt stop the medications and to follow up call next week to see how pt bp is

## 2021-09-08 NOTE — TELEPHONE ENCOUNTER
Good morning,  Miss Danny Fishman from [place] [facility] called to report patient's blood pressure. BP: 80/40 mmHg, 70/40 mmHg  HR: 70 per minute  No other symptoms.

## 2021-09-21 RX ORDER — BLOOD-GLUCOSE METER
1 EACH MISCELLANEOUS DAILY
Qty: 1 KIT | Refills: 0 | Status: SHIPPED | OUTPATIENT
Start: 2021-09-21 | End: 2021-10-20

## 2021-09-22 RX ORDER — LANCETS
1 EACH MISCELLANEOUS DAILY
Qty: 100 EACH | Refills: 3 | Status: SHIPPED | OUTPATIENT
Start: 2021-09-22

## 2021-09-22 NOTE — TELEPHONE ENCOUNTER
----- Message from Shantel Freeman sent at 9/22/2021  3:00 PM EDT -----  Subject: Message to Provider    QUESTIONS  Information for Provider? Pharmacist called to advise that patient needs   script for strips and lancets for a One Touch Ultra 2 machine she has . Pharmacist [address] [place] [address] from [facility] called. ---------------------------------------------------------------------------  --------------  Michael ESQUIVEL  What is the best way for the office to contact you? OK to leave message on   voicemail  Preferred Call Back Phone Number? [phone]  ---------------------------------------------------------------------------  --------------  SCRIPT ANSWERS  Relationship to Patient? Third Party  Representative Name?  [address] [place] [address]

## 2021-09-28 ENCOUNTER — TELEPHONE (OUTPATIENT)
Dept: PRIMARY CARE CLINIC | Age: 86
End: 2021-09-28

## 2021-10-20 RX ORDER — BLOOD-GLUCOSE METER
EACH MISCELLANEOUS
Qty: 1 KIT | Refills: 0 | Status: SHIPPED | OUTPATIENT
Start: 2021-10-20

## 2021-12-22 ENCOUNTER — TELEPHONE (OUTPATIENT)
Dept: PHARMACY | Facility: CLINIC | Age: 86
End: 2021-12-22

## 2021-12-22 NOTE — TELEPHONE ENCOUNTER
[facility] CLINICAL PHARMACY REVIEW: ADHERENCE REVIEW  Identified care gap per [employer]; fills at [place]: ACE/ARB, Diabetes and Statin adherence    Last Visit: 04/07/21    Patient also appears to be prescribed: LISINOPRIL   TAB 10MG , METFORMIN    TAB 500MG, PRAVASTATIN  TAB 40MG    Patient found in Outcomes MTM and is currently eligible for TIP    ASSESSMENT  ACE/ARB ADHERENCE    Per Insurance Records through [facility] ([facility] YTD [facility] = 34[facility] Potential Fail Date: 08/11/21):   LISINOPRIL   TAB 10MG last filled on 02/06/21 for 90 day supply. Next refill due: 06/16/21    Per Reconciled Dispense Report:  LISINOPRIL   TAB 10MG last filled on 05/28/21 for 90 day supply. BP Readings from Last 3 Encounters:   05/09/21 120/75   04/27/21 (!) 93/52   04/16/21 125/69     CrCl cannot be calculated (Patient's most recent lab result is older than the maximum 120 days allowed. ). DIABETES ADHERENCE    Per Insurance Records through [place] ([address] [facility] = 100%; YTD [place] = 38%; Potential Fail Date: 08/21/21): METFORMIN    TAB 500MG last filled on 05/29/21 for 10 day supply. Next refill due: 06/26/21    Per Reconciled Dispense Report:  METFORMIN    TAB 500MG last filled on 05/29/21 for 10 day supply. Lab Results   Component Value Date    LABA1C 5.7 05/04/2021    LABA1C 6.3 04/13/2021    LABA1C 6.1 04/05/2021     NOTE A1c <9%    STATIN ADHERENCE    Per Insurance Records through [employer] (2020 [facility] [place] = 97%; YTD PDC = 83%; Potential Fail Date: 12/17/21):   PRAVASTATIN  TAB 40MG last filled on 10/12/21 for 30 day supply. Next refill due: 11/11/21    Per Reconciled Dispense Report:  PRAVASTATIN  TAB 40MG last filled on 10/12/21 for 30 day supply. Per [employer]  PRAVASTATIN  TAB 40MG last filled on 10/12/21 for 30 day supply.      Lab Results   Component Value Date    CHOL 127 07/30/2020    TRIG 107 07/30/2020    HDL 40 07/30/2020    LDLCALC 66 07/30/2020     ALT   Date Value Ref Range Status   05/03/2021 11 10 - 40 U/L Final Comment:     Specimen hemolysis has exceeded the interference as defined by Roche. Result may be affected. Suggest recollection if clinically indicated. AST   Date Value Ref Range Status   05/03/2021 20 15 - 37 U/L Final     Comment:     Specimen hemolysis has exceeded the interference as defined by Roche. Value may be falsely increased. Suggest recollection if clinically  indicated. The ASCVD Risk score (Judd Mosley, et al., 2013) failed to calculate for the following reasons: The 2013 ASCVD risk score is only valid for ages 36 to 78     PLAN  No patient out reach planned at this time. Patient is in [address] patient to discuss adherence for all medications    No future appointments.     [facility] // Department, toll free [phone], Option 1     For Pharmacy Admin Tracking Only     CPA in place:  No   Intervention Detail: Adherence Monitoring: 3   Time Spent (min): 15

## 2022-03-04 NOTE — TELEPHONE ENCOUNTER
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  Now referred to me for right eye involvement.

## 2022-05-31 ENCOUNTER — TELEPHONE (OUTPATIENT)
Dept: PRIMARY CARE CLINIC | Age: 87
End: 2022-05-31

## 2022-05-31 NOTE — TELEPHONE ENCOUNTER
Ascencion Moreland from [address] [place] [address] would like to know if you able to sign pt death certificate pt passed on May 27th at home.